# Patient Record
Sex: MALE | Race: WHITE | NOT HISPANIC OR LATINO | Employment: UNEMPLOYED | URBAN - METROPOLITAN AREA
[De-identification: names, ages, dates, MRNs, and addresses within clinical notes are randomized per-mention and may not be internally consistent; named-entity substitution may affect disease eponyms.]

---

## 2017-04-02 ENCOUNTER — HOSPITAL ENCOUNTER (EMERGENCY)
Facility: OTHER | Age: 32
Discharge: HOME | End: 2017-04-02
Attending: INTERNAL MEDICINE
Payer: MEDICAID

## 2017-04-02 VITALS
OXYGEN SATURATION: 97 % | WEIGHT: 250 LBS | RESPIRATION RATE: 16 BRPM | BODY MASS INDEX: 31.08 KG/M2 | HEART RATE: 71 BPM | SYSTOLIC BLOOD PRESSURE: 136 MMHG | TEMPERATURE: 98 F | DIASTOLIC BLOOD PRESSURE: 95 MMHG | HEIGHT: 75 IN

## 2017-04-02 DIAGNOSIS — J00 ACUTE NASOPHARYNGITIS: Primary | ICD-10-CM

## 2017-04-02 PROCEDURE — 99283 EMERGENCY DEPT VISIT LOW MDM: CPT

## 2017-04-02 RX ORDER — FLUOXETINE 10 MG/1
20 TABLET ORAL DAILY
COMMUNITY
End: 2018-07-17

## 2017-04-02 RX ORDER — BENZONATATE 100 MG/1
200 CAPSULE ORAL 3 TIMES DAILY PRN
Qty: 20 CAPSULE | Refills: 0 | Status: SHIPPED | OUTPATIENT
Start: 2017-04-02 | End: 2017-04-12

## 2017-04-02 RX ORDER — LITHIUM CARBONATE 300 MG/1
300 CAPSULE ORAL
COMMUNITY
End: 2018-07-17

## 2017-04-02 RX ORDER — AZELASTINE 1 MG/ML
2 SPRAY, METERED NASAL 2 TIMES DAILY
Qty: 30 ML | Refills: 0 | Status: SHIPPED | OUTPATIENT
Start: 2017-04-02 | End: 2017-12-22

## 2017-04-02 RX ORDER — FLUTICASONE PROPIONATE 50 MCG
2 SPRAY, SUSPENSION (ML) NASAL DAILY
Qty: 15 G | Refills: 0 | Status: SHIPPED | OUTPATIENT
Start: 2017-04-02 | End: 2017-12-22

## 2017-04-02 RX ORDER — IBUPROFEN 800 MG/1
800 TABLET ORAL EVERY 8 HOURS PRN
Qty: 20 TABLET | Refills: 0 | Status: SHIPPED | OUTPATIENT
Start: 2017-04-02 | End: 2017-12-22

## 2017-04-02 NOTE — ED AVS SNAPSHOT
Former[facility] EMERGENCY DEPARTMENT  [address] [place] [address]               Vel Holguinthieux   2017  6:43 AM   ED    Description:  Male : 1985   Department:  [facility] Emergency Department           Your Care was Coordinated By:     Provider Role From To    Bigg Calzada MD Attending Provider 17 0657 --      Reason for Visit     Cough     Headache           Diagnoses this Visit        Comments    Acute nasopharyngitis    -  Primary       ED Disposition     ED Disposition Condition Comment    Discharge             To Do List           Follow-up Information     Follow up with Primary Doctor No In 1 week(s).       These Medications        Disp Refills Start End    ibuprofen (ADVIL,MOTRIN) 800 MG tablet 20 tablet 0 2017     Take 1 tablet (800 mg total) by mouth every 8 (eight) hours as needed for Pain. - Oral    fluticasone (FLONASE) 50 mcg/actuation nasal spray 15 g 0 2017     2 sprays by Each Nare route once daily. - Each Nare    azelastine (ASTELIN) 137 mcg (0.1 %) nasal spray 30 mL 0 2017    2 sprays (274 mcg total) by Nasal route 2 (two) times daily. - Nasal    benzonatate (TESSALON) 100 MG capsule 20 capsule 0 2017    Take 2 capsules (200 mg total) by mouth 3 (three) times daily as needed for Cough. - Oral      [facility]     [facility] On Call Nurse Care Line - 24/7 Assistance  Unless otherwise directed by your provider, please contact [facility], our nurse care line that is available for 24/7 assistance.     Registered nurses in the [facility] On Call Center provide: appointment scheduling, clinical advisement, health education, and other advisory services.  Call: [phone] (toll free)               Medications           Message regarding Medications     Verify the changes and/or additions to your medication regime listed below are the same as discussed with your clinician today.  If any of these changes or  "additions are incorrect, please notify your healthcare provider.        START taking these NEW medications        Refills    ibuprofen (ADVIL,MOTRIN) 800 MG tablet 0    Sig: Take 1 tablet (800 mg total) by mouth every 8 (eight) hours as needed for Pain.    Class: Print    Route: Oral    fluticasone (FLONASE) 50 mcg/actuation nasal spray 0    Si sprays by Each Nare route once daily.    Class: Print    Route: Each Nare    azelastine (ASTELIN) 137 mcg (0.1 %) nasal spray 0    Si sprays (274 mcg total) by Nasal route 2 (two) times daily.    Class: Print    Route: Nasal    benzonatate (TESSALON) 100 MG capsule 0    Sig: Take 2 capsules (200 mg total) by mouth 3 (three) times daily as needed for Cough.    Class: Print    Route: Oral           Verify that the below list of medications is an accurate representation of the medications you are currently taking.  If none reported, the list may be blank. If incorrect, please contact your healthcare provider. Carry this list with you in case of emergency.           Current Medications     fluoxetine 10 MG Tab Take 20 mg by mouth once daily.    lithium (ESKALITH) 300 MG capsule Take 300 mg by mouth 3 (three) times daily with meals.    azelastine (ASTELIN) 137 mcg (0.1 %) nasal spray 2 sprays (274 mcg total) by Nasal route 2 (two) times daily.    benzonatate (TESSALON) 100 MG capsule Take 2 capsules (200 mg total) by mouth 3 (three) times daily as needed for Cough.    fluticasone (FLONASE) 50 mcg/actuation nasal spray 2 sprays by Each Nare route once daily.    ibuprofen (ADVIL,MOTRIN) 800 MG tablet Take 1 tablet (800 mg total) by mouth every 8 (eight) hours as needed for Pain.           Clinical Reference Information           Your Vitals Were     BP Pulse Temp Resp Height Weight    136/95 (BP Location: Right arm, Patient Position: Sitting) 71 98 °F (36.7 °C) (Temporal) 16 6' 3" (1.905 m) 113.4 kg (250 lb)    SpO2 BMI             97% 31.25 kg/m2         Allergies as of " 4/2/2017     No Known Allergies      Immunizations Administered on Date of Encounter - 4/2/2017     None      ED Micro, Lab, POCT     None      ED Imaging Orders     None        Discharge Instructions           Viral Upper Respiratory Illness (Adult)  You have a viral upper respiratory illness (URI), which is another term for the common cold. This illness is contagious during the first few days. It is spread through the air by coughing and sneezing. It may also be spread by direct contact (touching the sick person and then touching your own eyes, nose, or mouth). Frequent handwashing will decrease risk of spread. Most viral illnesses go away within 7 to 10 days with rest and simple home remedies. Sometimes the illness may last for several weeks. Antibiotics will not kill a virus, and they are generally not prescribed for this condition.    Home care  · If symptoms are severe, rest at home for the first 2 to 3 days. When you resume activity, don't let yourself get too tired.  · Avoid being exposed to cigarette smoke (yours or others).  · You may use acetaminophen or ibuprofen to control pain and fever, unless another medicine was prescribed. (Note: If you have chronic liver or kidney disease, have ever had a stomach ulcer or gastrointestinal bleeding, or are taking blood-thinning medicines, talk with your healthcare provider before using these medicines.) Aspirin should never be given to anyone under 18 years of age who is ill with a viral infection or fever. It may cause severe liver or brain damage.  · Your appetite may be poor, so a light diet is fine. Avoid dehydration by drinking 6 to 8 glasses of fluids per day (water, soft drinks, juices, tea, or soup). Extra fluids will help loosen secretions in the nose and lungs.  · Over-the-counter cold medicines will not shorten the length of time youre sick, but they may be helpful for the following symptoms: cough, sore throat, and nasal and sinus congestion. (Note:  Do not use decongestants if you have high blood pressure.)  Follow-up care  Follow up with your healthcare provider, or as advised.  When to seek medical advice  Call your healthcare provider right away if any of these occur:  · Cough with lots of colored sputum (mucus)  · Severe headache; face, neck, or ear pain  · Difficulty swallowing due to throat pain  · Fever of 100.4°F (38°C)  Call 911, or get immediate medical care  Call emergency services right away if any of these occur:  · Chest pain, shortness of breath, wheezing, or difficulty breathing  · Coughing up blood  · Inability to swallow due to throat pain  Date Last Reviewed: 9/13/2015  © 5029-1757 [employer]. [address] [place] [address], [place] [address]. All rights reserved. This information is not intended as a substitute for professional medical care. Always follow your healthcare professional's instructions.          [facility] Sign-Up     Activating your [facility] account is as easy as 1-2-3!     1) Visit [email or web address], select Sign Up Now, enter this activation code and your date of birth, then select Next.  [email or web address]  Expires: 5/17/2017  7:04 AM      2) Create a username and password to use when you visit [facility] in the future and select a security question in case you lose your password and select Next.    3) Enter your e-mail address and click Sign Up!    Additional Information  If you have questions, please e-mail [email or web address] or call [phone] to talk to our [facility] staff. Remember, [facility] is NOT to be used for urgent needs. For medical emergencies, dial 911.          [facility] Emergency Department complies with applicable Federal civil rights laws and does not discriminate on the basis of race, color, national origin, age, disability, or sex.        Language Assistance Services     ATTENTION: Language assistance services are available, free of charge. Please call [phone].      ATENCIÓN: Lala scott  español, tiene a akins disposición servicios gratuitos de asistencia lingüística. Llame al [phone].     FERDINAND Ý: N?u b?n nói Ti?ng Vi?t, có các d?ch v? h? tr? ngôn ng? mi?n phí dành cho b?n. G?i s? [phone].

## 2017-04-02 NOTE — ED PROVIDER NOTES
Encounter Date: 4/2/2017       History     Chief Complaint   Patient presents with    Cough     cough for the last month, states OTC meds are not working    Headache     Review of patient's allergies indicates:  No Known Allergies  Patient is a 31 y.o. male presenting with the following complaint: URI. The history is provided by the patient. No  was used.   URI   The primary symptoms include cough. The current episode started several days ago. This is a new problem. The problem has not changed since onset.  The cough began more than 1 week ago. The sputum is clear.   Symptoms associated with the illness include congestion. The following treatments were addressed: A decongestant was ineffective.     No past medical history on file.  No past surgical history on file.  No family history on file.  Social History   Substance Use Topics    Smoking status: Never Smoker    Smokeless tobacco: Not on file    Alcohol use No     Review of Systems   Constitutional: Negative.    HENT: Positive for congestion.    Eyes: Negative.    Respiratory: Positive for cough.    Cardiovascular: Negative.    Gastrointestinal: Negative.    Endocrine: Negative.    Musculoskeletal: Negative.    Skin: Negative.    Allergic/Immunologic: Negative.    Neurological: Negative.    Hematological: Negative.    Psychiatric/Behavioral: Negative.        Physical Exam   Initial Vitals   BP Pulse Resp Temp SpO2   04/02/17 0638 04/02/17 0638 04/02/17 0638 04/02/17 0638 04/02/17 0638   136/95 71 16 98 °F (36.7 °C) 97 %     Physical Exam    Nursing note and vitals reviewed.  Constitutional: He appears well-developed and well-nourished.   HENT:   Head: Normocephalic.   Nose: Rhinorrhea present.   Mouth/Throat: Posterior oropharyngeal erythema present. No oropharyngeal exudate or posterior oropharyngeal edema.   Eyes: EOM are normal.   Neck: Normal range of motion.   Cardiovascular: Normal rate and regular rhythm.   Pulmonary/Chest: Breath  sounds normal. No respiratory distress. He has no wheezes.   Abdominal: Soft.   Musculoskeletal: Normal range of motion.   Neurological: He is alert.   Skin: Skin is warm.         ED Course   Procedures  Labs Reviewed - No data to display             Additional MDM:   Differential Diagnosis:   Symptom: Cough. <> The follow diagnoses were considered and will be evaluated: ACE Inhibitor Induced Cough, Asthma, Pneumonitis and Viral Bronchitis.                     ED Course     Labs Reviewed  No visits with results within 1 Day(s) from this visit.  Latest known visit with results is:    Historical on 04/04/2008   Component Date Value Ref Range Status    Glucose 04/04/2008 103  70 - 110 mg/dl Final    BUN, Bld 04/04/2008 12  5 - 23 mg/dl Final    Creatinine 04/04/2008 1.0  0.5 - 1.4 mg/dl Final    Calcium 04/04/2008 9.6  8.7 - 10.5 mg/dl Final    Sodium 04/04/2008 138  136 - 145 mMol/l Final    Potassium 04/04/2008 4.0  3.3 - 5.3 mMol/l Final    Chloride 04/04/2008 100  95 - 110 mMol/l Final    Total Protein 04/04/2008 7.2  6.0 - 8.4 gm/dl Final    Albumin 04/04/2008 4.8  3.5 - 5.2 g/dl Final    Total Bilirubin 04/04/2008 0.6  0.1 - 1.0 mg/dl Final    Comment: These are the guidelines recommended by the .  Especially  for infants and newborns, interpretation of results should be based  on gestational age, weight and in agreement with clinical  observations.  .  Premature Infant recommended reference ranges:  Up to 24 hours.............<8.0 mg/dl  Up to 48 hours............<12.0 mg/dl  3-5 days..................<15.0 mg/dl  6-29 days.................<15.0 mg/dl      AST 04/04/2008 19  0 - 37 U/L Final    Alkaline Phosphatase 04/04/2008 104  55 - 135 U/L Final    CO2 04/04/2008 28  23.0 - 29.0 mEq/L Final    ALT 04/04/2008 34  0 - 40 U/L Final    Cholesterol 04/04/2008 148  120 - 199 mg/dL Final    Comment: The National Cholesterol Education Program (NCEP) has set the  following guidelines  (reference ranges) for Cholesterol:  Desirable...................<200 mg/dL  Borderline High.............200-239 mg/dL  High........................> or = 240 mg/dL      Triglycerides 04/04/2008 102  38 - 125 mg/dL Final    Comment: The National Cholesterol Education Program (NCEP) has set the  following guidelines (reference values) for triglycerides:  Normal......................<150 mg/dL  Borderline High.............150-199 mg/dL  High........................200-499 mg/dL  Very High...................> or = 500 mg/dL      HDL 04/04/2008 29* 40 - 63 mg/dL Final    Comment: The National Cholesterol Education Program (NCEP) has set the  following guidelines (reference values) for HDL Cholesterol:  Low...............<40  Optimal...........>60      LDL Cholesterol 04/04/2008 98.6  63 - 129 mg/dl Final    Comment: The National Cholesterol Education Program (NCEP) has set the  following guidelines (reference values) for LDL Cholesterol:  Desirable.....................<130 mg/dL  Borderline High...............130-159 mg/dL  High..........................> or = 160 mg/dL      HDL/Chol Ratio 04/04/2008 19.6* 20 - 50 % Final    Total Cholesterol/HDL Ratio 04/04/2008 5.1* 2.0 - 5.0 Final    WBC 04/04/2008 8.05  4.8 - 10.8 K/uL Final    RBC 04/04/2008 5.14  4.60 - 6.20 M/uL Final    Hemoglobin 04/04/2008 14.9  14.0 - 18.0 gm/dl Final    Hematocrit 04/04/2008 46.4  40.0 - 54.0 % Final    MCV 04/04/2008 90.3  82 - 95 fL Final    MCH 04/04/2008 29.0  27 - 31 pg Final    MCHC 04/04/2008 32.1  32 - 36 % Final    RDW 04/04/2008 13.0  11.5 - 14.5 % Final    Gran% 04/04/2008 53.9  40 - 76 % Final    Lymph% 04/04/2008 28.6  25 - 40 % Final    Mono% 04/04/2008 13.2* 0 - 10 % Final    Eosinophil% 04/04/2008 3.7  0.0 - 8.0 % Final    Basophil% 04/04/2008 0.6  0 - 2 % Final    Gran # 04/04/2008 4.3  1.4 - 8.7 K/uL Final    Lymph # 04/04/2008 2.3  1.2 - 3.5 K/uL Final    Mono # 04/04/2008 1.1* 0.1 - 0.8 K/uL Final     Eos # 04/04/2008 0.3  0.0 - 0.4 K/uL Final    Baso # 04/04/2008 0.1  0.0 - 0.1 K/uL Final    Platelets 04/04/2008 324  150 - 350 K/uL Final    MPV 04/04/2008 10.7  9.2 - 12.9 fL Final    TSH 04/04/2008 2.8  0.4 - 4.0 uIU/ml Final    Test Name 04/04/2008 HEMOGLOBIN A1C, BLOOD   Final    Normal Range 04/04/2008 4.7-5.8%   Final    DRUG TEST RESULT 04/04/2008 5.3%   Final        Imaging Reviewed    Imaging Results     None          Medications given in ED    Medications - No data to display    Discharge Medications     Discharge Medication List as of 4/2/2017  7:04 AM      START taking these medications    Details   azelastine (ASTELIN) 137 mcg (0.1 %) nasal spray 2 sprays (274 mcg total) by Nasal route 2 (two) times daily., Starting 4/2/2017, Until Mon 4/2/18, Print      benzonatate (TESSALON) 100 MG capsule Take 2 capsules (200 mg total) by mouth 3 (three) times daily as needed for Cough., Starting 4/2/2017, Until Wed 4/12/17, Print      fluticasone (FLONASE) 50 mcg/actuation nasal spray 2 sprays by Each Nare route once daily., Starting 4/2/2017, Until Discontinued, Print      ibuprofen (ADVIL,MOTRIN) 800 MG tablet Take 1 tablet (800 mg total) by mouth every 8 (eight) hours as needed for Pain., Starting 4/2/2017, Until Discontinued, Print         CONTINUE these medications which have NOT CHANGED    Details   fluoxetine 10 MG Tab Take 20 mg by mouth once daily., Until Discontinued, Historical Med      lithium (ESKALITH) 300 MG capsule Take 300 mg by mouth 3 (three) times daily with meals., Until Discontinued, Historical Med                   Patient discharged to home in stable condition with instructions to:   1. Please take all meds as prescribed.  2. Follow-up with your primary care doctor   3. Return precautions discussed and patient and/or family/caretaker understands to return to the emergency room for any concerns including worsening of your current symptoms, fever, chills, night sweats, worsening pain,  chest pain, shortness of breath, nausea, vomiting, diarrhea, bleeding, headache, difficulty talking, visual disturbances, weakness, numbness or any other acute concerns    Clinical Impression:   Acute nasopharyngitis  Disposition:   Disposition: Discharged  Condition: Stable       Bigg Calzada MD  04/02/17 0744

## 2017-04-02 NOTE — DISCHARGE INSTRUCTIONS
Viral Upper Respiratory Illness (Adult)  You have a viral upper respiratory illness (URI), which is another term for the common cold. This illness is contagious during the first few days. It is spread through the air by coughing and sneezing. It may also be spread by direct contact (touching the sick person and then touching your own eyes, nose, or mouth). Frequent handwashing will decrease risk of spread. Most viral illnesses go away within 7 to 10 days with rest and simple home remedies. Sometimes the illness may last for several weeks. Antibiotics will not kill a virus, and they are generally not prescribed for this condition.    Home care  · If symptoms are severe, rest at home for the first 2 to 3 days. When you resume activity, don't let yourself get too tired.  · Avoid being exposed to cigarette smoke (yours or others).  · You may use acetaminophen or ibuprofen to control pain and fever, unless another medicine was prescribed. (Note: If you have chronic liver or kidney disease, have ever had a stomach ulcer or gastrointestinal bleeding, or are taking blood-thinning medicines, talk with your healthcare provider before using these medicines.) Aspirin should never be given to anyone under 18 years of age who is ill with a viral infection or fever. It may cause severe liver or brain damage.  · Your appetite may be poor, so a light diet is fine. Avoid dehydration by drinking 6 to 8 glasses of fluids per day (water, soft drinks, juices, tea, or soup). Extra fluids will help loosen secretions in the nose and lungs.  · Over-the-counter cold medicines will not shorten the length of time youre sick, but they may be helpful for the following symptoms: cough, sore throat, and nasal and sinus congestion. (Note: Do not use decongestants if you have high blood pressure.)  Follow-up care  Follow up with your healthcare provider, or as advised.  When to seek medical advice  Call your healthcare provider right away if  any of these occur:  · Cough with lots of colored sputum (mucus)  · Severe headache; face, neck, or ear pain  · Difficulty swallowing due to throat pain  · Fever of 100.4°F (38°C)  Call 911, or get immediate medical care  Call emergency services right away if any of these occur:  · Chest pain, shortness of breath, wheezing, or difficulty breathing  · Coughing up blood  · Inability to swallow due to throat pain  Date Last Reviewed: 9/13/2015 © 2000-2016 [employer]. [address] [place] [address]. All rights reserved. This information is not intended as a substitute for professional medical care. Always follow your healthcare professional's instructions.

## 2017-12-22 ENCOUNTER — HOSPITAL ENCOUNTER (EMERGENCY)
Facility: OTHER | Age: 32
Discharge: HOME | End: 2017-12-22
Attending: INTERNAL MEDICINE
Payer: MEDICAID

## 2017-12-22 VITALS
HEART RATE: 86 BPM | RESPIRATION RATE: 18 BRPM | WEIGHT: 250 LBS | TEMPERATURE: 98 F | HEIGHT: 75 IN | SYSTOLIC BLOOD PRESSURE: 139 MMHG | BODY MASS INDEX: 31.08 KG/M2 | DIASTOLIC BLOOD PRESSURE: 86 MMHG | OXYGEN SATURATION: 96 %

## 2017-12-22 DIAGNOSIS — J00 ACUTE NASOPHARYNGITIS: Primary | ICD-10-CM

## 2017-12-22 DIAGNOSIS — B34.9 VIRAL SYNDROME: ICD-10-CM

## 2017-12-22 PROCEDURE — 99283 EMERGENCY DEPT VISIT LOW MDM: CPT

## 2017-12-22 RX ORDER — FLUTICASONE PROPIONATE 50 MCG
2 SPRAY, SUSPENSION (ML) NASAL DAILY
Qty: 15 G | Refills: 0 | Status: SHIPPED | OUTPATIENT
Start: 2017-12-22 | End: 2018-07-17

## 2017-12-22 RX ORDER — AZELASTINE 1 MG/ML
2 SPRAY, METERED NASAL 2 TIMES DAILY
Qty: 30 ML | Refills: 0 | Status: SHIPPED | OUTPATIENT
Start: 2017-12-22 | End: 2018-07-17

## 2017-12-22 RX ORDER — IBUPROFEN 800 MG/1
800 TABLET ORAL EVERY 8 HOURS PRN
Qty: 20 TABLET | Refills: 0 | Status: SHIPPED | OUTPATIENT
Start: 2017-12-22 | End: 2018-07-17

## 2017-12-23 NOTE — ED PROVIDER NOTES
Encounter Date: 12/22/2017       History     Chief Complaint   Patient presents with    Cough     pt c/o cough and HA x 12 hours     32-year-old male presents to the emergency department complaining of cough and congestion times one day.  Also states he has a mild headache.  Denies fevers/chills, but states he had an episode of vomiting after a coughing spell      The history is provided by the patient. No  was used.   URI   The primary symptoms include headaches and cough. The current episode started today. This is a new problem. The problem has not changed since onset.  The headache began today. Headache is a new problem. The pain from the headache is at a severity of 2/10. Location/region(s) of the headache: bilateral.   The cough began today. The cough is productive. The sputum is clear.   The onset of the illness is associated with exposure to sick contacts. Symptoms associated with the illness include congestion and rhinorrhea.     Review of patient's allergies indicates:  No Known Allergies  History reviewed. No pertinent past medical history.  History reviewed. No pertinent surgical history.  No family history on file.  Social History   Substance Use Topics    Smoking status: Never Smoker    Smokeless tobacco: Never Used    Alcohol use No     Review of Systems   HENT: Positive for congestion, postnasal drip and rhinorrhea.    Respiratory: Positive for cough.    Neurological: Positive for headaches.   All other systems reviewed and are negative.      Physical Exam     Initial Vitals [12/22/17 2135]   BP Pulse Resp Temp SpO2   139/86 86 18 98.2 °F (36.8 °C) 96 %      MAP       103.67         Physical Exam    Nursing note and vitals reviewed.  Constitutional: He appears well-developed and well-nourished. No distress.   HENT:   Head: Normocephalic and atraumatic.   Right Ear: External ear normal.   Left Ear: External ear normal.   Clear post nasal drip, clear nasal discharge, posterior  oropharyngeal erythema without edema   Eyes: EOM are normal.   Neck: Normal range of motion.   Cardiovascular: Normal rate and regular rhythm.   Pulmonary/Chest: Breath sounds normal. No respiratory distress. He has no wheezes. He has no rales.   Abdominal: Soft.   Musculoskeletal: Normal range of motion. He exhibits no tenderness.   Neurological: He is alert. He has normal strength.   Skin: Skin is warm and dry.   Psychiatric: He has a normal mood and affect.         ED Course   Procedures  Labs Reviewed - No data to display          Medical Decision Making:   Initial Assessment:   32-year-old male presents to the emergency department complaining of cough and congestion times one day.  Also states he has a mild headache.  Denies fevers/chills, but states he had an episode of vomiting after a coughing spell  Differential Diagnosis:   Influenza  Acute nasopharyngitis  Strep throat  Headache  ED Management:  Patient was given instructions for acute nasopharyngitis and prescriptions for Astelin, fluticasone and ibuprofen.  He is advised to follow-up with his primary care physician within the next week for reevaluation.                   ED Course      Clinical Impression:   The primary encounter diagnosis was Acute nasopharyngitis. A diagnosis of Viral syndrome was also pertinent to this visit.    Disposition:   Disposition: Discharged  Condition: Stable                        Bigg Calzada MD  12/22/17 7003

## 2017-12-23 NOTE — ED TRIAGE NOTES
Pt presents to ER with c/o cough and headache all day, has been exposed to family members with flu.

## 2018-03-21 ENCOUNTER — CLINICAL SUPPORT (OUTPATIENT)
Dept: OCCUPATIONAL MEDICINE | Facility: CLINIC | Age: 33
End: 2018-03-21

## 2018-03-21 DIAGNOSIS — Z02.1 PRE-EMPLOYMENT DRUG SCREENING: ICD-10-CM

## 2018-03-21 PROCEDURE — 80305 DRUG TEST PRSMV DIR OPT OBS: CPT | Mod: S$GLB,,, | Performed by: EMERGENCY MEDICINE

## 2018-03-21 NOTE — PROGRESS NOTES
Subjective:       Patient ID: Vel Ordonez is a 32 y.o. male.    Chief Complaint: Drug / Alcohol Assessment    HPI  ROS    Objective:      Physical Exam    Assessment:       No diagnosis found.    Plan:       Patient here for pre-employment drug screen.            No Follow-up on file.

## 2018-07-08 ENCOUNTER — HOSPITAL ENCOUNTER (EMERGENCY)
Facility: HOSPITAL | Age: 33
Discharge: PSYCHIATRIC OR SUBSTANCE USE FACILITY | End: 2018-07-08
Attending: EMERGENCY MEDICINE
Payer: MEDICAID

## 2018-07-08 VITALS
BODY MASS INDEX: 30.84 KG/M2 | HEART RATE: 83 BPM | HEIGHT: 75 IN | RESPIRATION RATE: 20 BRPM | DIASTOLIC BLOOD PRESSURE: 83 MMHG | OXYGEN SATURATION: 97 % | TEMPERATURE: 98 F | WEIGHT: 248 LBS | SYSTOLIC BLOOD PRESSURE: 132 MMHG

## 2018-07-08 DIAGNOSIS — R45.851 SUICIDAL IDEATION: ICD-10-CM

## 2018-07-08 DIAGNOSIS — F30.9 MANIC EPISODE: Primary | ICD-10-CM

## 2018-07-08 LAB
ALBUMIN SERPL BCP-MCNC: 4.4 G/DL
ALP SERPL-CCNC: 84 U/L
ALT SERPL W/O P-5'-P-CCNC: 26 U/L
AMPHET+METHAMPHET UR QL: NEGATIVE
ANION GAP SERPL CALC-SCNC: 13 MMOL/L
APAP SERPL-MCNC: <3 UG/ML
AST SERPL-CCNC: 18 U/L
BACTERIA #/AREA URNS AUTO: NORMAL /HPF
BARBITURATES UR QL SCN>200 NG/ML: NEGATIVE
BASOPHILS # BLD AUTO: 0.05 K/UL
BASOPHILS NFR BLD: 0.3 %
BENZODIAZ UR QL SCN>200 NG/ML: NEGATIVE
BILIRUB SERPL-MCNC: 1 MG/DL
BILIRUB UR QL STRIP: NEGATIVE
BUN SERPL-MCNC: 13 MG/DL
BZE UR QL SCN: NEGATIVE
CALCIUM SERPL-MCNC: 10 MG/DL
CANNABINOIDS UR QL SCN: NEGATIVE
CHLORIDE SERPL-SCNC: 106 MMOL/L
CLARITY UR REFRACT.AUTO: CLEAR
CO2 SERPL-SCNC: 22 MMOL/L
COLOR UR AUTO: ABNORMAL
CREAT SERPL-MCNC: 1 MG/DL
CREAT UR-MCNC: 357 MG/DL
DIFFERENTIAL METHOD: ABNORMAL
EOSINOPHIL # BLD AUTO: 0 K/UL
EOSINOPHIL NFR BLD: 0.1 %
ERYTHROCYTE [DISTWIDTH] IN BLOOD BY AUTOMATED COUNT: 13.5 %
EST. GFR  (AFRICAN AMERICAN): >60 ML/MIN/1.73 M^2
EST. GFR  (NON AFRICAN AMERICAN): >60 ML/MIN/1.73 M^2
ETHANOL SERPL-MCNC: <10 MG/DL
GLUCOSE SERPL-MCNC: 167 MG/DL
GLUCOSE UR QL STRIP: ABNORMAL
HCT VFR BLD AUTO: 45.5 %
HGB BLD-MCNC: 15.1 G/DL
HGB UR QL STRIP: NEGATIVE
HYALINE CASTS UR QL AUTO: 0 /LPF
IMM GRANULOCYTES # BLD AUTO: 0.05 K/UL
IMM GRANULOCYTES NFR BLD AUTO: 0.3 %
KETONES UR QL STRIP: NEGATIVE
LEUKOCYTE ESTERASE UR QL STRIP: NEGATIVE
LITHIUM SERPL-SCNC: <0.1 MMOL/L
LYMPHOCYTES # BLD AUTO: 1.8 K/UL
LYMPHOCYTES NFR BLD: 11.6 %
MCH RBC QN AUTO: 30.4 PG
MCHC RBC AUTO-ENTMCNC: 33.2 G/DL
MCV RBC AUTO: 92 FL
METHADONE UR QL SCN>300 NG/ML: NEGATIVE
MICROSCOPIC COMMENT: NORMAL
MONOCYTES # BLD AUTO: 1.1 K/UL
MONOCYTES NFR BLD: 6.9 %
NEUTROPHILS # BLD AUTO: 12.3 K/UL
NEUTROPHILS NFR BLD: 80.8 %
NITRITE UR QL STRIP: NEGATIVE
NRBC BLD-RTO: 0 /100 WBC
OPIATES UR QL SCN: NEGATIVE
PCP UR QL SCN>25 NG/ML: NEGATIVE
PH UR STRIP: 5 [PH] (ref 5–8)
PLATELET # BLD AUTO: 375 K/UL
PMV BLD AUTO: 10 FL
POTASSIUM SERPL-SCNC: 3.8 MMOL/L
PROT SERPL-MCNC: 8.1 G/DL
PROT UR QL STRIP: ABNORMAL
RBC # BLD AUTO: 4.97 M/UL
RBC #/AREA URNS AUTO: 1 /HPF (ref 0–4)
SODIUM SERPL-SCNC: 141 MMOL/L
SP GR UR STRIP: >=1.03 (ref 1–1.03)
SQUAMOUS #/AREA URNS AUTO: 0 /HPF
T4 FREE SERPL-MCNC: 1.08 NG/DL
TOXICOLOGY INFORMATION: NORMAL
TSH SERPL DL<=0.005 MIU/L-ACNC: 0.04 UIU/ML
URN SPEC COLLECT METH UR: ABNORMAL
UROBILINOGEN UR STRIP-ACNC: 4 EU/DL
WBC # BLD AUTO: 15.22 K/UL
WBC #/AREA URNS AUTO: 0 /HPF (ref 0–5)

## 2018-07-08 PROCEDURE — 84443 ASSAY THYROID STIM HORMONE: CPT

## 2018-07-08 PROCEDURE — 93010 ELECTROCARDIOGRAM REPORT: CPT | Mod: ,,, | Performed by: INTERNAL MEDICINE

## 2018-07-08 PROCEDURE — 80320 DRUG SCREEN QUANTALCOHOLS: CPT

## 2018-07-08 PROCEDURE — 80178 ASSAY OF LITHIUM: CPT

## 2018-07-08 PROCEDURE — 25000003 PHARM REV CODE 250

## 2018-07-08 PROCEDURE — 99285 EMERGENCY DEPT VISIT HI MDM: CPT | Mod: ,,, | Performed by: EMERGENCY MEDICINE

## 2018-07-08 PROCEDURE — 93005 ELECTROCARDIOGRAM TRACING: CPT

## 2018-07-08 PROCEDURE — 80329 ANALGESICS NON-OPIOID 1 OR 2: CPT

## 2018-07-08 PROCEDURE — 81001 URINALYSIS AUTO W/SCOPE: CPT

## 2018-07-08 PROCEDURE — 80307 DRUG TEST PRSMV CHEM ANLYZR: CPT

## 2018-07-08 PROCEDURE — 84439 ASSAY OF FREE THYROXINE: CPT

## 2018-07-08 PROCEDURE — 85025 COMPLETE CBC W/AUTO DIFF WBC: CPT

## 2018-07-08 PROCEDURE — 99285 EMERGENCY DEPT VISIT HI MDM: CPT | Mod: 25

## 2018-07-08 PROCEDURE — 80053 COMPREHEN METABOLIC PANEL: CPT

## 2018-07-08 RX ORDER — ACETAMINOPHEN 325 MG/1
650 TABLET ORAL
Status: COMPLETED | OUTPATIENT
Start: 2018-07-08 | End: 2018-07-08

## 2018-07-08 RX ORDER — HYDROXYZINE PAMOATE 50 MG/1
50 CAPSULE ORAL DAILY PRN
COMMUNITY
End: 2018-07-17

## 2018-07-08 RX ADMIN — ACETAMINOPHEN 650 MG: 325 TABLET, FILM COATED ORAL at 08:07

## 2018-07-08 NOTE — ED NOTES
Pt identifiers Vel Ordonez were checked and are correct  LOC: The patient is awake, alert, aware of environment with an appropriate affect. Oriented x3, speaking appropriately  APPEARANCE: Pt resting comfortably, in no acute distress, pt is clean and well groomed, clothing properly fastened  SKIN: Skin warm, dry and intact, normal skin turgor, moist mucus membranes  RESPIRATORY: Airway is open and patent, respirations are spontaneous, even and unlabored, normal effort and rate  Breath sounds clear roberto to all lung fields on auscultation  CARDIAC: Normal rate and rhythm, no peripheral edema noted, capillary refill < 3 seconds, bilateral radial pulses 2+  ABDOMEN: Soft, nontender, nondistended. Bowel sounds present to all four quad of abd on auscultation  NEUROLOGIC: PERRL, facial expression is symmetrical, patient moving all extremities spontaneously, normal sensation in all extremities when touched with a finger.  Follows all commands appropriately  MUSCULOSKELETAL: No obvious deformities.

## 2018-07-08 NOTE — ED NOTES
PEC packet faxed to [facility], [facility], Pending sale to [facility].

## 2018-07-08 NOTE — ED PROVIDER NOTES
Encounter Date: 2018    SCRIBE #1 NOTE: I, Estefania Smith, am scribing for, and in the presence of,  Dr. Mares. I have scribed the following portions of the note - the APC attestation and the EKG reading.       History     Chief Complaint   Patient presents with    Suicidal     Feeling suicidal intermittent x 2 weeks.  Pt here voluntarily.     32-year-old male with medical history of bipolar 1 disorder presents the violent behavior suicidal ideation.  Significant other is in the room patient reports that his symptoms have worsened past 2 weeks.  He had a similar episode 8 months ago when he found out his ex-girlfriend was having a baby.  He also endorses his mother dying of cancer in May and  limited ability to see his .  Patient denies violence towards others but he reports hitting objects such as cars and walls.  His father and significant other concerned that he will 1 day/out on them or 1 of his children.  Her significant other his father contact the 's office was planning to OPC but patient voluntarily brought self the ER today. Patient denies SI but SO reports that he has threatened to cut himself with a  on multiple occasions. Patient followed by Dr. Uriarte at [facility]. currenlty on prozac 10mg daily and vistaril 20mg prn, was taken off of lithium.           Review of patient's allergies indicates:  No Known Allergies  Past Medical History:   Diagnosis Date    Bipolar 1 disorder      History reviewed. No pertinent surgical history.  Family History   Problem Relation Age of Onset    Cancer Mother     Heart disease Father     Diabetes Father      Social History   Substance Use Topics    Smoking status: Never Smoker    Smokeless tobacco: Never Used    Alcohol use No     Review of Systems   Constitutional: Positive for appetite change. Negative for chills, diaphoresis, fatigue and fever.   HENT: Negative for congestion.    Eyes: Negative for visual disturbance.    Respiratory: Negative for cough and shortness of breath.    Cardiovascular: Negative for chest pain and palpitations.   Gastrointestinal: Negative for abdominal pain, nausea and vomiting.   Genitourinary: Negative for dysuria, flank pain and hematuria.   Musculoskeletal: Negative for myalgias.   Skin: Negative for rash.   Neurological: Negative for tremors, syncope, weakness, light-headedness and headaches.   Psychiatric/Behavioral: Positive for behavioral problems and suicidal ideas. The patient is not nervous/anxious.         Patient denies SI but SO reports he has threatened to cut his wrist with a          Physical Exam     Initial Vitals [07/08/18 1615]   BP Pulse Resp Temp SpO2   (!) 162/86 92 20 98.2 °F (36.8 °C) 100 %      MAP       --         Physical Exam    Vitals reviewed.  Constitutional: He appears well-developed and well-nourished. He is not diaphoretic. No distress.   HENT:   Head: Normocephalic and atraumatic.   Nose: Nose normal.   Mouth/Throat: Oropharynx is clear and moist. No oropharyngeal exudate.   Eyes: Conjunctivae and EOM are normal. Pupils are equal, round, and reactive to light.   Neck: Normal range of motion. Neck supple. No thyromegaly present.   Cardiovascular: Normal rate, regular rhythm and intact distal pulses.   Pulmonary/Chest: Breath sounds normal. No respiratory distress. He has no wheezes. He exhibits no tenderness.   Abdominal: Soft. Bowel sounds are normal. He exhibits no distension. There is no tenderness. There is no rebound.   Musculoskeletal: Normal range of motion.   Lymphadenopathy:     He has no cervical adenopathy.   Neurological: He is alert and oriented to person, place, and time. He has normal strength. No sensory deficit.   Skin: Skin is warm and dry. Capillary refill takes less than 2 seconds. No rash noted.   Psychiatric: His mood appears anxious. His speech is not rapid and/or pressured and not slurred. He is not actively hallucinating. Thought  content is not paranoid and not delusional. He exhibits a depressed mood. He expresses suicidal ideation. He expresses no homicidal ideation. He expresses suicidal plans. He expresses no homicidal plans.         ED Course   Procedures  Labs Reviewed   CBC W/ AUTO DIFFERENTIAL - Abnormal; Notable for the following:        Result Value    WBC 15.22 (*)     Platelets 375 (*)     Gran # (ANC) 12.3 (*)     Immature Grans (Abs) 0.05 (*)     Mono # 1.1 (*)     Gran% 80.8 (*)     Lymph% 11.6 (*)     All other components within normal limits   COMPREHENSIVE METABOLIC PANEL - Abnormal; Notable for the following:     CO2 22 (*)     Glucose 167 (*)     All other components within normal limits   TSH - Abnormal; Notable for the following:     TSH 0.042 (*)     All other components within normal limits    Narrative:     ADD ON LITHIUM PER DR BALTAZAR CORONEL/ORDER# [id number] @ 17:39   7/8/18   ACETAMINOPHEN LEVEL - Abnormal; Notable for the following:     Acetaminophen (Tylenol), Serum <3.0 (*)     All other components within normal limits   URINALYSIS, REFLEX TO URINE CULTURE - Abnormal; Notable for the following:     Specific Gravity, UA >=1.030 (*)     Protein, UA 1+ (*)     Glucose, UA 1+ (*)     All other components within normal limits    Narrative:     Preferred Collection Type->Urine, Clean Catch  cup received  07/08/2018  17:36    LITHIUM LEVEL - Abnormal; Notable for the following:     Lithium Lvl <0.1 (*)     All other components within normal limits    Narrative:     ADD ON LITHIUM PER DR BALTAZAR CORONEL/ORDER# [id number] @ 17:39   7/8/18   DRUG SCREEN PANEL, URINE EMERGENCY    Narrative:     Preferred Collection Type->Urine, Clean Catch  cup received  07/08/2018  17:36    ALCOHOL,MEDICAL (ETHANOL)   LITHIUM LEVEL   T4, FREE    Narrative:     ADD ON LITHIUM PER DR BALTAZAR CORONEL/ORDER# [id number] @ 17:39   7/8/18   URINALYSIS MICROSCOPIC    Narrative:     Preferred Collection Type->Urine, Clean Catch  cup  received  2018  17:36      EKG Readings: (Independently Interpreted)   Sinus rhythm with left axis deviation and normal ST segments at 89 bpm       Imaging Results    None          Medical Decision Making:   History:   Old Medical Records: I decided to obtain old medical records.  Old Records Summarized: records from clinic visits.  Initial Assessment:   32-year-old male with medical history bipolar 1 disorder presents to the ED with violent behavior and suicidal ideation x 2 weeks. SO states situation began to decline with death of patient's mother in May and unable to be apart of his 's life. Reports hitting cars and walls, has not hit anyone but concerns that he will hit someone. Currently on prozac and vistaril. Reports being taken off of lithium.  Differential Diagnosis:   DDX includes but is not limited to SI, manic episode, electrolyte abnormality, anemia, medication non compliance.   Independently Interpreted Test(s):   I have ordered and independently interpreted EKG Reading(s) - see prior notes  Clinical Tests:   Lab Tests: Ordered and Reviewed  Medical Tests: Ordered and Reviewed  ED Management:  Will get labs, UA, EKG and PEC patient.     Significant labs include TSH .042. Awaiting T4. All other labs are benign. EKG shows sinus rhythm at 89bpm. T4 .042. Patient is medically cleared for inpatient psychiatric admission.    I have discussed the treatment and management of this patient with my supervisory physician, and we agree on the plan of care.               Scribe Attestation:   Scribe #1: I performed the above scribed service and the documentation accurately describes the services I performed. I attest to the accuracy of the note.    Attending Attestation:     Physician Attestation Statement for NP/PA:   I have conducted a face to face encounter with this patient in addition to the NP/PA, due to Medical Complexity    Other NP/PA Attestation Additions:    History of Present Illness: 32  year old man with co-morbidities of bipolar 1 disorder presents for evaluation of worsening agitation with suicidal ideations and violent outbursts.                       Clinical Impression:   The primary encounter diagnosis was Manic episode. A diagnosis of Suicidal ideation was also pertinent to this visit.      Disposition:   Disposition: Transferred  Condition: Stable                        Genevieve Murillo PA-C  07/08/18 6182

## 2018-07-08 NOTE — ED TRIAGE NOTES
" P t state for past two weeks pt has been upset and having episode of "going off on people"   Pt has been blacking out and has been very anxious   "

## 2018-07-09 NOTE — ED NOTES
Called [facility]. JANINE Turcios. Unable to take another pt due to pt acting up and being aggressive in [facility]. Will let charge nurse know.

## 2018-07-09 NOTE — ED NOTES
Pt accepted to [facility]. Accepting MD Lanny Call report [phone] option#2.  JANINE Sanchez was informed.

## 2018-07-17 ENCOUNTER — HOSPITAL ENCOUNTER (OUTPATIENT)
Facility: HOSPITAL | Age: 33
Discharge: HOME | End: 2018-07-18
Attending: EMERGENCY MEDICINE | Admitting: INTERNAL MEDICINE
Payer: MEDICAID

## 2018-07-17 DIAGNOSIS — I21.4 NSTEMI (NON-ST ELEVATED MYOCARDIAL INFARCTION): ICD-10-CM

## 2018-07-17 DIAGNOSIS — J00 ACUTE NASOPHARYNGITIS (COMMON COLD): ICD-10-CM

## 2018-07-17 DIAGNOSIS — N17.9 AKI (ACUTE KIDNEY INJURY): ICD-10-CM

## 2018-07-17 DIAGNOSIS — R55 SYNCOPE, UNSPECIFIED SYNCOPE TYPE: ICD-10-CM

## 2018-07-17 DIAGNOSIS — F31.9 BIPOLAR 1 DISORDER: Chronic | ICD-10-CM

## 2018-07-17 DIAGNOSIS — R79.89 ELEVATED TROPONIN: Primary | ICD-10-CM

## 2018-07-17 LAB
ALBUMIN SERPL-MCNC: 3.9 G/DL (ref 3.3–5.5)
ALP SERPL-CCNC: 64 U/L (ref 42–141)
AMPHET+METHAMPHET UR QL: NEGATIVE
BARBITURATES UR QL SCN>200 NG/ML: NEGATIVE
BASOPHILS # BLD AUTO: 0.05 K/UL
BASOPHILS NFR BLD: 0.4 %
BENZODIAZ UR QL SCN>200 NG/ML: NEGATIVE
BILIRUB SERPL-MCNC: 0.5 MG/DL (ref 0.2–1.6)
BILIRUBIN, POC UA: NEGATIVE
BLOOD, POC UA: NEGATIVE
BUN SERPL-MCNC: 14 MG/DL (ref 7–22)
BZE UR QL SCN: NEGATIVE
CALCIUM SERPL-MCNC: 8.7 MG/DL (ref 8–10.3)
CANNABINOIDS UR QL SCN: NEGATIVE
CHLORIDE SERPL-SCNC: 104 MMOL/L (ref 98–108)
CK SERPL-CCNC: 112 U/L
CLARITY, POC UA: CLEAR
COLOR, POC UA: YELLOW
CREAT SERPL-MCNC: 1.3 MG/DL (ref 0.6–1.2)
CREAT UR-MCNC: 119 MG/DL
DIFFERENTIAL METHOD: ABNORMAL
EOSINOPHIL # BLD AUTO: 0.1 K/UL
EOSINOPHIL NFR BLD: 1 %
ERYTHROCYTE [DISTWIDTH] IN BLOOD BY AUTOMATED COUNT: 14.1 %
GLUCOSE SERPL-MCNC: 80 MG/DL (ref 73–118)
GLUCOSE, POC UA: NEGATIVE
HCT VFR BLD AUTO: 45.9 %
HGB BLD-MCNC: 15.3 G/DL
KETONES, POC UA: NEGATIVE
LEUKOCYTE EST, POC UA: NEGATIVE
LYMPHOCYTES # BLD AUTO: 2.2 K/UL
LYMPHOCYTES NFR BLD: 15.5 %
MCH RBC QN AUTO: 30.4 PG
MCHC RBC AUTO-ENTMCNC: 33.3 G/DL
MCV RBC AUTO: 91 FL
METHADONE UR QL SCN>300 NG/ML: NEGATIVE
MONOCYTES # BLD AUTO: 1.9 K/UL
MONOCYTES NFR BLD: 13.5 %
NEUTROPHILS # BLD AUTO: 9.9 K/UL
NEUTROPHILS NFR BLD: 69.6 %
NITRITE, POC UA: NEGATIVE
OPIATES UR QL SCN: NEGATIVE
PCP UR QL SCN>25 NG/ML: NEGATIVE
PH UR STRIP: 6.5 [PH]
PLATELET # BLD AUTO: 350 K/UL
PMV BLD AUTO: 10.3 FL
POC ALT (SGPT): 25 U/L (ref 10–47)
POC AST (SGOT): 22 U/L (ref 11–38)
POC B-TYPE NATRIURETIC PEPTIDE: 28.7 PG/ML (ref 0–100)
POC CARDIAC TROPONIN I: 0.22 NG/ML
POC D-DI: <100 NG/ML (ref 0–450)
POC TCO2: 26 MMOL/L (ref 18–33)
POTASSIUM BLD-SCNC: 4.2 MMOL/L (ref 3.6–5.1)
PROTEIN, POC UA: NEGATIVE
PROTEIN, POC: 7 G/DL (ref 6.4–8.1)
RBC # BLD AUTO: 5.03 M/UL
SAMPLE: ABNORMAL
SODIUM BLD-SCNC: 145 MMOL/L (ref 128–145)
SPECIFIC GRAVITY, POC UA: 1.01
TOXICOLOGY INFORMATION: NORMAL
UROBILINOGEN, POC UA: 1 E.U./DL
VALPROATE SERPL-MCNC: 61.6 UG/ML
WBC # BLD AUTO: 14.22 K/UL

## 2018-07-17 PROCEDURE — 80164 ASSAY DIPROPYLACETIC ACD TOT: CPT

## 2018-07-17 PROCEDURE — 63600175 PHARM REV CODE 636 W HCPCS: Performed by: EMERGENCY MEDICINE

## 2018-07-17 PROCEDURE — 25000003 PHARM REV CODE 250: Performed by: EMERGENCY MEDICINE

## 2018-07-17 PROCEDURE — 85025 COMPLETE CBC W/AUTO DIFF WBC: CPT

## 2018-07-17 PROCEDURE — 96361 HYDRATE IV INFUSION ADD-ON: CPT

## 2018-07-17 PROCEDURE — 93010 ELECTROCARDIOGRAM REPORT: CPT | Mod: ,,, | Performed by: INTERNAL MEDICINE

## 2018-07-17 PROCEDURE — 81003 URINALYSIS AUTO W/O SCOPE: CPT

## 2018-07-17 PROCEDURE — 96372 THER/PROPH/DIAG INJ SC/IM: CPT | Mod: 59

## 2018-07-17 PROCEDURE — 85379 FIBRIN DEGRADATION QUANT: CPT

## 2018-07-17 PROCEDURE — 99285 EMERGENCY DEPT VISIT HI MDM: CPT | Mod: 25

## 2018-07-17 PROCEDURE — 82550 ASSAY OF CK (CPK): CPT

## 2018-07-17 PROCEDURE — 80307 DRUG TEST PRSMV CHEM ANLYZR: CPT

## 2018-07-17 PROCEDURE — 84484 ASSAY OF TROPONIN QUANT: CPT

## 2018-07-17 PROCEDURE — 83880 ASSAY OF NATRIURETIC PEPTIDE: CPT

## 2018-07-17 PROCEDURE — 93005 ELECTROCARDIOGRAM TRACING: CPT

## 2018-07-17 PROCEDURE — 96360 HYDRATION IV INFUSION INIT: CPT

## 2018-07-17 PROCEDURE — 80053 COMPREHEN METABOLIC PANEL: CPT

## 2018-07-17 RX ORDER — ASPIRIN 325 MG
325 TABLET ORAL
Status: COMPLETED | OUTPATIENT
Start: 2018-07-17 | End: 2018-07-17

## 2018-07-17 RX ORDER — QUETIAPINE FUMARATE 100 MG/1
TABLET, FILM COATED ORAL NIGHTLY
COMMUNITY
End: 2018-12-01

## 2018-07-17 RX ORDER — ENOXAPARIN SODIUM 100 MG/ML
100 INJECTION SUBCUTANEOUS
Status: COMPLETED | OUTPATIENT
Start: 2018-07-17 | End: 2018-07-17

## 2018-07-17 RX ORDER — DIVALPROEX SODIUM 250 MG/1
500 TABLET, DELAYED RELEASE ORAL 3 TIMES DAILY
COMMUNITY
End: 2018-12-01

## 2018-07-17 RX ORDER — SODIUM CHLORIDE 9 MG/ML
1000 INJECTION, SOLUTION INTRAVENOUS
Status: COMPLETED | OUTPATIENT
Start: 2018-07-17 | End: 2018-07-18

## 2018-07-17 RX ADMIN — ASPIRIN 325 MG ORAL TABLET 325 MG: 325 PILL ORAL at 10:07

## 2018-07-17 RX ADMIN — SODIUM CHLORIDE 1000 ML: 0.9 INJECTION, SOLUTION INTRAVENOUS at 10:07

## 2018-07-17 RX ADMIN — ENOXAPARIN SODIUM 100 MG: 100 INJECTION SUBCUTANEOUS at 10:07

## 2018-07-17 NOTE — LETTER
July 18, 2018         [place] [address]  Phone: [phone]       Patient: Vel Ordonez   YOB: 1985  Date of Visit: 07/18/2018    To Whom It May Concern:    Phoenix Ordonez  was at [facility] on 07/18/2018. He may return to work/school on 7/23/18 with restrictions, no heavy lifting or strenuous activities with right arm for 2 full weeks. If you have any questions or concerns, or if I can be of further assistance, please do not hesitate to contact me.    Sincerely,          Buck Kline Jr., NP

## 2018-07-18 VITALS
HEIGHT: 75 IN | BODY MASS INDEX: 31.36 KG/M2 | SYSTOLIC BLOOD PRESSURE: 121 MMHG | WEIGHT: 252.19 LBS | TEMPERATURE: 99 F | HEART RATE: 82 BPM | OXYGEN SATURATION: 100 % | RESPIRATION RATE: 18 BRPM | DIASTOLIC BLOOD PRESSURE: 73 MMHG

## 2018-07-18 PROBLEM — F31.9 BIPOLAR 1 DISORDER: Chronic | Status: ACTIVE | Noted: 2018-07-18

## 2018-07-18 PROBLEM — I21.4 NSTEMI (NON-ST ELEVATED MYOCARDIAL INFARCTION): Status: ACTIVE | Noted: 2018-07-17

## 2018-07-18 LAB
ALBUMIN SERPL BCP-MCNC: 3.8 G/DL
ALP SERPL-CCNC: 59 U/L
ALT SERPL W/O P-5'-P-CCNC: 22 U/L
ANION GAP SERPL CALC-SCNC: 9 MMOL/L
AST SERPL-CCNC: 15 U/L
BASOPHILS # BLD AUTO: 0.04 K/UL
BASOPHILS NFR BLD: 0.4 %
BILIRUB SERPL-MCNC: 0.4 MG/DL
BUN SERPL-MCNC: 14 MG/DL
CALCIUM SERPL-MCNC: 9.2 MG/DL
CHLORIDE SERPL-SCNC: 109 MMOL/L
CHOLEST SERPL-MCNC: 129 MG/DL
CHOLEST/HDLC SERPL: 6.5 {RATIO}
CO2 SERPL-SCNC: 26 MMOL/L
CREAT SERPL-MCNC: 1 MG/DL
DIFFERENTIAL METHOD: ABNORMAL
EOSINOPHIL # BLD AUTO: 0.2 K/UL
EOSINOPHIL NFR BLD: 1.6 %
ERYTHROCYTE [DISTWIDTH] IN BLOOD BY AUTOMATED COUNT: 14.3 %
EST. GFR  (AFRICAN AMERICAN): >60 ML/MIN/1.73 M^2
EST. GFR  (NON AFRICAN AMERICAN): >60 ML/MIN/1.73 M^2
ESTIMATED AVG GLUCOSE: 100 MG/DL
ESTIMATED PA SYSTOLIC PRESSURE: 28.79
GLOBAL PERICARDIAL EFFUSION: NORMAL
GLUCOSE SERPL-MCNC: 89 MG/DL
HBA1C MFR BLD HPLC: 5.1 %
HCT VFR BLD AUTO: 42.4 %
HDLC SERPL-MCNC: 20 MG/DL
HDLC SERPL: 15.5 %
HGB BLD-MCNC: 13.8 G/DL
INR PPP: 1
LDLC SERPL CALC-MCNC: 77.8 MG/DL
LYMPHOCYTES # BLD AUTO: 3 K/UL
LYMPHOCYTES NFR BLD: 28.3 %
MAGNESIUM SERPL-MCNC: 2.3 MG/DL
MCH RBC QN AUTO: 30.1 PG
MCHC RBC AUTO-ENTMCNC: 32.5 G/DL
MCV RBC AUTO: 93 FL
MITRAL VALVE MOBILITY: NORMAL
MITRAL VALVE REGURGITATION: NORMAL
MONOCYTES # BLD AUTO: 1.3 K/UL
MONOCYTES NFR BLD: 11.8 %
NEUTROPHILS # BLD AUTO: 6.2 K/UL
NEUTROPHILS NFR BLD: 57.5 %
NONHDLC SERPL-MCNC: 109 MG/DL
PHOSPHATE SERPL-MCNC: 3.6 MG/DL
PLATELET # BLD AUTO: 324 K/UL
PMV BLD AUTO: 9.9 FL
POTASSIUM SERPL-SCNC: 4 MMOL/L
PROT SERPL-MCNC: 6.7 G/DL
PROTHROMBIN TIME: 10.7 SEC
RBC # BLD AUTO: 4.58 M/UL
RETIRED EF AND QEF - SEE NOTES: 55 (ref 55–65)
SODIUM SERPL-SCNC: 144 MMOL/L
TRICUSPID VALVE REGURGITATION: NORMAL
TRIGL SERPL-MCNC: 156 MG/DL
TROPONIN I SERPL DL<=0.01 NG/ML-MCNC: 0.11 NG/ML
TROPONIN I SERPL DL<=0.01 NG/ML-MCNC: 0.19 NG/ML
TROPONIN I SERPL DL<=0.01 NG/ML-MCNC: 0.27 NG/ML
WBC # BLD AUTO: 10.75 K/UL

## 2018-07-18 PROCEDURE — 36415 COLL VENOUS BLD VENIPUNCTURE: CPT

## 2018-07-18 PROCEDURE — 25000003 PHARM REV CODE 250: Performed by: HOSPITALIST

## 2018-07-18 PROCEDURE — 93010 ELECTROCARDIOGRAM REPORT: CPT | Mod: ,,, | Performed by: INTERNAL MEDICINE

## 2018-07-18 PROCEDURE — 93005 ELECTROCARDIOGRAM TRACING: CPT | Mod: 59

## 2018-07-18 PROCEDURE — 96361 HYDRATE IV INFUSION ADD-ON: CPT

## 2018-07-18 PROCEDURE — 25000003 PHARM REV CODE 250: Performed by: INTERNAL MEDICINE

## 2018-07-18 PROCEDURE — 83735 ASSAY OF MAGNESIUM: CPT

## 2018-07-18 PROCEDURE — 25000003 PHARM REV CODE 250

## 2018-07-18 PROCEDURE — 63600175 PHARM REV CODE 636 W HCPCS

## 2018-07-18 PROCEDURE — 85610 PROTHROMBIN TIME: CPT

## 2018-07-18 PROCEDURE — 84484 ASSAY OF TROPONIN QUANT: CPT

## 2018-07-18 PROCEDURE — 85025 COMPLETE CBC W/AUTO DIFF WBC: CPT

## 2018-07-18 PROCEDURE — G0378 HOSPITAL OBSERVATION PER HR: HCPCS

## 2018-07-18 PROCEDURE — 93306 TTE W/DOPPLER COMPLETE: CPT

## 2018-07-18 PROCEDURE — 80061 LIPID PANEL: CPT

## 2018-07-18 PROCEDURE — 93306 TTE W/DOPPLER COMPLETE: CPT | Mod: 26,,, | Performed by: INTERNAL MEDICINE

## 2018-07-18 PROCEDURE — 93458 L HRT ARTERY/VENTRICLE ANGIO: CPT

## 2018-07-18 PROCEDURE — 93458 L HRT ARTERY/VENTRICLE ANGIO: CPT | Mod: 26,,, | Performed by: INTERNAL MEDICINE

## 2018-07-18 PROCEDURE — 84484 ASSAY OF TROPONIN QUANT: CPT | Mod: 91

## 2018-07-18 PROCEDURE — 63600175 PHARM REV CODE 636 W HCPCS: Performed by: INTERNAL MEDICINE

## 2018-07-18 PROCEDURE — 84100 ASSAY OF PHOSPHORUS: CPT

## 2018-07-18 PROCEDURE — 80053 COMPREHEN METABOLIC PANEL: CPT

## 2018-07-18 PROCEDURE — 99220 PR INITIAL OBSERVATION CARE,LEVL III: CPT | Mod: ,,, | Performed by: INTERNAL MEDICINE

## 2018-07-18 PROCEDURE — 83036 HEMOGLOBIN GLYCOSYLATED A1C: CPT

## 2018-07-18 RX ORDER — ONDANSETRON 2 MG/ML
4 INJECTION INTRAMUSCULAR; INTRAVENOUS EVERY 12 HOURS PRN
Status: DISCONTINUED | OUTPATIENT
Start: 2018-07-18 | End: 2018-07-18 | Stop reason: HOSPADM

## 2018-07-18 RX ORDER — ENOXAPARIN SODIUM 150 MG/ML
1 INJECTION SUBCUTANEOUS
Status: DISCONTINUED | OUTPATIENT
Start: 2018-07-18 | End: 2018-07-18

## 2018-07-18 RX ORDER — NITROGLYCERIN 0.4 MG/1
0.4 TABLET SUBLINGUAL EVERY 5 MIN PRN
Status: DISCONTINUED | OUTPATIENT
Start: 2018-07-18 | End: 2018-07-18 | Stop reason: HOSPADM

## 2018-07-18 RX ORDER — RAMELTEON 8 MG/1
8 TABLET ORAL NIGHTLY PRN
Status: DISCONTINUED | OUTPATIENT
Start: 2018-07-18 | End: 2018-07-18 | Stop reason: HOSPADM

## 2018-07-18 RX ORDER — ACETAMINOPHEN 325 MG/1
650 TABLET ORAL EVERY 4 HOURS PRN
Status: DISCONTINUED | OUTPATIENT
Start: 2018-07-18 | End: 2018-07-18 | Stop reason: HOSPADM

## 2018-07-18 RX ORDER — CLOPIDOGREL 300 MG/1
300 TABLET, FILM COATED ORAL ONCE
Status: COMPLETED | OUTPATIENT
Start: 2018-07-18 | End: 2018-07-18

## 2018-07-18 RX ORDER — SODIUM CHLORIDE 9 MG/ML
INJECTION, SOLUTION INTRAVENOUS CONTINUOUS
Status: ACTIVE | OUTPATIENT
Start: 2018-07-18 | End: 2018-07-18

## 2018-07-18 RX ORDER — ACETAMINOPHEN 500 MG
500 TABLET ORAL EVERY 6 HOURS PRN
Status: DISCONTINUED | OUTPATIENT
Start: 2018-07-18 | End: 2018-07-18 | Stop reason: HOSPADM

## 2018-07-18 RX ORDER — QUETIAPINE FUMARATE 100 MG/1
100 TABLET, FILM COATED ORAL DAILY
Status: DISCONTINUED | OUTPATIENT
Start: 2018-07-18 | End: 2018-07-18 | Stop reason: HOSPADM

## 2018-07-18 RX ORDER — ATROPINE SULFATE 0.1 MG/ML
0.5 INJECTION INTRAVENOUS
Status: DISCONTINUED | OUTPATIENT
Start: 2018-07-18 | End: 2018-07-18 | Stop reason: HOSPADM

## 2018-07-18 RX ORDER — ASPIRIN 81 MG/1
81 TABLET ORAL DAILY
Status: DISCONTINUED | OUTPATIENT
Start: 2018-07-18 | End: 2018-07-18 | Stop reason: HOSPADM

## 2018-07-18 RX ORDER — MORPHINE SULFATE 4 MG/ML
2 INJECTION, SOLUTION INTRAMUSCULAR; INTRAVENOUS EVERY 10 MIN PRN
Status: DISCONTINUED | OUTPATIENT
Start: 2018-07-18 | End: 2018-07-18 | Stop reason: HOSPADM

## 2018-07-18 RX ORDER — AMOXICILLIN 250 MG
1 CAPSULE ORAL 2 TIMES DAILY PRN
Status: DISCONTINUED | OUTPATIENT
Start: 2018-07-18 | End: 2018-07-18 | Stop reason: HOSPADM

## 2018-07-18 RX ORDER — CARVEDILOL 6.25 MG/1
3.12 TABLET ORAL 2 TIMES DAILY
Qty: 30 TABLET | Refills: 11 | Status: ON HOLD | OUTPATIENT
Start: 2018-07-18 | End: 2018-09-02 | Stop reason: HOSPADM

## 2018-07-18 RX ORDER — HYDROCODONE BITARTRATE AND ACETAMINOPHEN 5; 325 MG/1; MG/1
1 TABLET ORAL EVERY 4 HOURS PRN
Status: DISCONTINUED | OUTPATIENT
Start: 2018-07-18 | End: 2018-07-18 | Stop reason: HOSPADM

## 2018-07-18 RX ORDER — MORPHINE SULFATE 4 MG/ML
2 INJECTION, SOLUTION INTRAMUSCULAR; INTRAVENOUS EVERY 4 HOURS PRN
Status: DISCONTINUED | OUTPATIENT
Start: 2018-07-18 | End: 2018-07-18 | Stop reason: HOSPADM

## 2018-07-18 RX ORDER — ATORVASTATIN CALCIUM 80 MG/1
80 TABLET, FILM COATED ORAL DAILY
Qty: 90 TABLET | Refills: 3 | Status: SHIPPED | OUTPATIENT
Start: 2018-07-19 | End: 2019-07-19

## 2018-07-18 RX ORDER — ASPIRIN 81 MG/1
81 TABLET ORAL DAILY
Refills: 0 | COMMUNITY
Start: 2018-07-19 | End: 2019-07-19

## 2018-07-18 RX ORDER — CARVEDILOL 6.25 MG/1
6.25 TABLET ORAL 2 TIMES DAILY
Status: DISCONTINUED | OUTPATIENT
Start: 2018-07-18 | End: 2018-07-18 | Stop reason: HOSPADM

## 2018-07-18 RX ORDER — ATORVASTATIN CALCIUM 40 MG/1
80 TABLET, FILM COATED ORAL DAILY
Status: DISCONTINUED | OUTPATIENT
Start: 2018-07-18 | End: 2018-07-18 | Stop reason: HOSPADM

## 2018-07-18 RX ORDER — DIVALPROEX SODIUM 250 MG/1
250 TABLET, DELAYED RELEASE ORAL 3 TIMES DAILY
Status: DISCONTINUED | OUTPATIENT
Start: 2018-07-18 | End: 2018-07-18 | Stop reason: HOSPADM

## 2018-07-18 RX ORDER — ONDANSETRON 2 MG/ML
8 INJECTION INTRAMUSCULAR; INTRAVENOUS EVERY 8 HOURS PRN
Status: DISCONTINUED | OUTPATIENT
Start: 2018-07-18 | End: 2018-07-18 | Stop reason: HOSPADM

## 2018-07-18 RX ORDER — CLOPIDOGREL BISULFATE 75 MG/1
75 TABLET ORAL DAILY
Status: DISCONTINUED | OUTPATIENT
Start: 2018-07-19 | End: 2018-07-18 | Stop reason: HOSPADM

## 2018-07-18 RX ORDER — ASPIRIN 325 MG
325 TABLET ORAL DAILY
Status: DISCONTINUED | OUTPATIENT
Start: 2018-07-18 | End: 2018-07-18

## 2018-07-18 RX ADMIN — SODIUM CHLORIDE: 0.9 INJECTION, SOLUTION INTRAVENOUS at 07:07

## 2018-07-18 RX ADMIN — CLOPIDOGREL BISULFATE 300 MG: 300 TABLET, FILM COATED ORAL at 07:07

## 2018-07-18 RX ADMIN — SODIUM CHLORIDE 5 ML/KG/HR: 0.9 INJECTION, SOLUTION INTRAVENOUS at 01:07

## 2018-07-18 RX ADMIN — DIVALPROEX SODIUM 250 MG: 250 TABLET, DELAYED RELEASE ORAL at 02:07

## 2018-07-18 RX ADMIN — CLOPIDOGREL BISULFATE 300 MG: 300 TABLET, FILM COATED ORAL at 06:07

## 2018-07-18 RX ADMIN — HYDROCODONE BITARTRATE AND ACETAMINOPHEN 1 TABLET: 5; 325 TABLET ORAL at 04:07

## 2018-07-18 RX ADMIN — ATORVASTATIN CALCIUM 80 MG: 40 TABLET, FILM COATED ORAL at 06:07

## 2018-07-18 RX ADMIN — ASPIRIN 81 MG: 81 TABLET, COATED ORAL at 08:07

## 2018-07-18 RX ADMIN — CARVEDILOL 6.25 MG: 6.25 TABLET, FILM COATED ORAL at 06:07

## 2018-07-18 RX ADMIN — DIVALPROEX SODIUM 250 MG: 250 TABLET, DELAYED RELEASE ORAL at 08:07

## 2018-07-18 RX ADMIN — ENOXAPARIN SODIUM 120 MG: 150 INJECTION SUBCUTANEOUS at 06:07

## 2018-07-18 NOTE — PLAN OF CARE
07/18/18 1740   Final Note   Assessment Type Final Discharge Note   Discharge Disposition Home   What phone number can be called within the next 1-3 days to see how you are doing after discharge? ([phone])   Hospital Follow Up  Appt(s) scheduled? Yes   Discharge plans and expectations educations in teach back method with documentation complete? Yes   Right Care Referral Info   Post Acute Recommendation No Care   Follow up appointment scheduled for pt with PCP.  All information (Date, time, location and contact info) placed in AVS and on DC sheet.  Information given and explained to pt, as well as instructed to call 24-48 hours prior to scheduled time if rescheduling needed.  Patient instructed to bring all meds currently taking in their original bottles along with insurance card and picture ID to all appointments. Patient instructed that someone from Dr Lozano's office will call with an appointment.  EDUCATION:  Things You are responsible For To Manage Your Care At Home:  1.    Getting your prescriptions filled   2.    Taking your medications as directed, DO NOT MISS ANY DOSES!  3.    Going to your follow-up doctor appointment. This is important because it  allow the doctor to monitor your progress and determine if  any changes need to made to your treatment plan.   Nursemaria guadalupe notified that all AM needs are met

## 2018-07-18 NOTE — DISCHARGE SUMMARY
[facility] Hospital Medicine  Discharge Summary      Patient Name: Vel Ordonez  MRN: [id number]  Admission Date: 7/17/2018  Hospital Length of Stay: 0 days  Discharge Date and Time:  07/18/2018 4:38 PM  Attending Physician: Irene Manzo MD   Discharging Provider: Buck Kline Jr, NP  Primary Care Provider: Primary Doctor No      HPI:   Mr. Vel Ordonez is a 32 y.o. male with Bipolar 1 disorder who presented initially to [facility] ED with complaints of chest pain for three days.  The pain is mid-sternal without radiation, is squeezing in quality, and is 5/10 in severity at its worst.  The pain is associated with dyspnea, diaphoresis, palpitations, and nausea without vomiting.  It is intermittent without any triggers and is without any alleviating or exacerbating factors.  He has not had any coughing, hemoptysis, nor any lower extremity pain or swelling.  He has never had similar issues in the past but does say that his dad had a heart attack at the age of 50 years.  Of note, he's had intermittent loss of consciousness for the past five days but denies that it's associated with his chest pain.  He denies any illicit drug use.    Procedure(s) (LRB):  Left heart cath R rad access, not before 9am (Left)      Hospital Course:   Mr. Ordonez was placed in observation for ACS rule out after presenting with chest pain.  EKG without evidence of acute ischemia, troponin elevated x3 with a peak of 0.266.  He was seen and evaluated by Cardiology and underwent left heart catheterization with showed normal coronaries and LV function.  Recommendation for ASA, beta blocker, and statin with outpatient follow up.  Prescriptions provided.  His pain resolved and did not recur.  Discharged home in stable condition to follow up with his PCP and Cardiology.     Consults:   Consults         Status Ordering Provider     Inpatient consult to Cardiology  Once     Provider:  Asher Lozano MD     Completed SANGEETHA PEPPER          No new Assessment & Plan notes have been filed under this hospital service since the last note was generated.  Service: Hospital Medicine    Final Active Diagnoses:    Diagnosis Date Noted POA    PRINCIPAL PROBLEM:  NSTEMI (non-ST elevated myocardial infarction) [I21.4] 07/17/2018 Yes    Bipolar 1 disorder [F31.9] 07/18/2018 Yes     Chronic    Elevated troponin [R74.8]  Yes      Problems Resolved During this Admission:    Diagnosis Date Noted Date Resolved POA       Discharged Condition: stable    Disposition: Home or Self Care    Follow Up:  Follow-up Information     [facility] On 7/23/2018.    Why:  Hospital follow up, please rive at 1:30 PM  Contact information:  [address] [address] [phone]             Asher Lozano MD.    Specialties:  Cardiology, INTERVENTIONAL CARDIOLOGY  Why:  OFFICE WILL CALL YOU WITH FOLLOW UP APPOINTMENT DATE AND TIME  Contact information:  [address] [facility]  [address]  [facility] [address] [phone]                 Patient Instructions:     Diet Cardiac     Other restrictions (specify):   Order Comments: No heavy lifting or strenuous activity for 2 weeks     No driving until:   Order Comments: Sunday 7/22/18         Significant Diagnostic Studies: Labs:   CMP   Recent Labs  Lab 07/18/18  0153      K 4.0      CO2 26   GLU 89   BUN 14   CREATININE 1.0   CALCIUM 9.2   PROT 6.7   ALBUMIN 3.8   BILITOT 0.4   ALKPHOS 59   AST 15   ALT 22   ANIONGAP 9   ESTGFRAFRICA >60   EGFRNONAA >60   , CBC   Recent Labs  Lab 07/17/18  2140 07/18/18  0153   WBC 14.22* 10.75   HGB 15.3 13.8*   HCT 45.9 42.4    324   , Lipid Panel   Lab Results   Component Value Date    CHOL 129 07/18/2018    HDL 20 (L) 07/18/2018    LDLCALC 77.8 07/18/2018    TRIG 156 (H) 07/18/2018    CHOLHDL 15.5 (L) 07/18/2018   , Troponin   Recent Labs  Lab 07/18/18  1517   TROPONINI 0.106*    and A1C:   Recent Labs  Lab  07/18/18  0808   HGBA1C 5.1     Angiography: Impression:  ACS  Normal cors/LV fxn  R rad vasband      Pending Diagnostic Studies:     Procedure Component Value Units Date/Time    EKG 12-LEAD [[id number]]     Order Status:  Sent Lab Status:  No result     EKG 12-LEAD [[id number]]     Order Status:  Sent Lab Status:  No result     EKG 12-LEAD [[id number]]     Order Status:  Sent Lab Status:  No result     EKG 12-LEAD [[id number]]     Order Status:  Sent Lab Status:  No result          Medications:  Reconciled Home Medications:      Medication List      START taking these medications    aspirin 81 MG EC tablet  Commonly known as:  ECOTRIN  Take 1 tablet (81 mg total) by mouth once daily.  Start taking on:  7/19/2018     atorvastatin 80 MG tablet  Commonly known as:  LIPITOR  Take 1 tablet (80 mg total) by mouth once daily.  Start taking on:  7/19/2018     carvedilol 6.25 MG tablet  Commonly known as:  COREG  Take 0.5 tablets (3.125 mg total) by mouth 2 (two) times daily.        CONTINUE taking these medications    divalproex 250 MG EC tablet  Commonly known as:  DEPAKOTE  Take 250 mg by mouth 3 (three) times daily. 250mg daily  250mg afternoon and 500mg nightly     QUEtiapine 100 MG Tab  Commonly known as:  SEROQUEL  Take by mouth every evening.            Indwelling Lines/Drains at time of discharge:   Lines/Drains/Airways          No matching active lines, drains, or airways          Time spent on the discharge of patient: less than 30 minutes  Patient was seen and examined on the date of discharge and determined to be suitable for discharge.         Buck Kline Jr NP  Department of Hospital Medicine  [facility]

## 2018-07-18 NOTE — ED NOTES
Gave verbal report on this pt. Via telephone to JANINE Erazo at telephone number:  [phone], and all questions answered.

## 2018-07-18 NOTE — PROGRESS NOTES
Patient returned to unit from scheduled procedure. Patient awake, alert, oriented. Patient without c/o discomfort. Tele monitoring in progress. No apparent distress noted.  Vas ban to R wrist intact.  No bleeding or hematoma noted.

## 2018-07-18 NOTE — NURSING
Dr Vaibhav LAMB. To repeat labs now because our lab results may be different. New order for Troponin now and q6 x3, cbc, cmp, mg, phos now.

## 2018-07-18 NOTE — BRIEF OP NOTE
[facility]  Brief Operative Note     SUMMARY     Surgery Date: 7/18/2018     Surgeon(s) and Role:     * Asher Lozano MD - Primary    Assisting Surgeon: None    Pre-op Diagnosis:  ACS/NSTEMI (non-ST elevated myocardial infarction) [I21.4]    Post-op Diagnosis:  same    Procedure(s) (LRB):  Left heart cath R rad access, not before 9am (Left)    Anesthesia: RN IV Sedation    Description of the findings of the procedure: see below    Findings/Key Components:  LVEDP: 12mmHg  LVEF: 55% by echo  Wall Motion: normal by echo    Dominance: Left  LM: normal  LAD: normal  Ramus: normal  LCx: dom, normal  RCA: nondom, normal    Hemostasis:  R Radial band    Impression:  ACS  Normal cors/LV fxn  R rad vasband    Plan:  Cont med rx  ASA 81mg qd  Plavix 75mg qd for 1 year (thru 7/2018)  BBl/Statin +/- ACEi  Home today  Follow up with Dr. Lozano 2-3 weeks    Estimated Blood Loss: <50cc         Specimens: None

## 2018-07-18 NOTE — NURSING
In preparation for discharge, d/c'ed patient's tele monitor,  NSR prior to removal, d/c'ed patient's saline lock, applied pressure to site, secured site with tape and gauze. Vas band removed per order. Site is clean, dry, intact, no bleeding or hematoma. Site covered with gauze and clear Tegaderm.

## 2018-07-18 NOTE — NURSING
Patient transported to cath lab for scheudled procedure. No complaints, no acute distress noted. IV fluids sent with patient. Wife notified of transfer to procedure. Will monitor upon return to unit.

## 2018-07-18 NOTE — ED PROVIDER NOTES
"Encounter Date: 7/17/2018    SCRIBE #1 NOTE: I, Edin Springer, am scribing for, and in the presence of,  Dr. Joe. I have scribed the entire note.       History     Chief Complaint   Patient presents with    Chest Pain     pt presents to ER with c/o chest pain intermittently accompanied by periods of unresponsiveness, and left arm numbness  that started over the weekend.  He denies pain at present and had started taking Depakote a week ago.       Time patient was seen by the provider: 8:53 PM     Patient with bipolar disorder. Patient recently was hospitalized 2 weeks ago for suicidal ideation (Prozac and Lithium discontinued at that time, Patient is now taking Depakote 1000 mg daily dividend TID) presents to the ED complaining of acute intermittent "passing out spells" that first began on Friday per the patients girlfriend. Patient was laying in bed watching television when she states he lost consciousness and became stiff, drenched in sweat and then had staring spells and did not answer questions for what lasted for a total of 10 minutes.  She denies seeing him shake. She said there was no tongue biting or incontinence of bowel or bladder. EMS was not called. Patient had a mild headache after that that resolved spontaneously The next day patient states he felt completely normal until laying down in bed that night. Girlfriend states he passed out twice and hour apart from one another. She said each episode lasted about 10 minutes and again did not involved tongue biting or incontinence. Patient states he felt normal Sunday. While at work today at around 11 a.m., he called his girlfriend stating "he didn't feel right". He reports that he felt mid sternal short chest pain that lasted about 10 minutes without nausea, vomiting, SOB, or syncope. He reports intermittent left arm tingling. All symptoms are currently resolved except a 2/10 frontal headache.     Patient states his father had an MI in his fifties.  " Patient does not smoke, drink or do drugs.    Denies SI/HI or mood disturbance today.               The history is provided by the spouse.     Review of patient's allergies indicates:  No Known Allergies  Past Medical History:   Diagnosis Date    Bipolar 1 disorder 2011    Hypertension      History reviewed. No pertinent surgical history.  Family History   Problem Relation Age of Onset    Cancer Mother     Heart disease Father     Diabetes Father      Social History   Substance Use Topics    Smoking status: Never Smoker    Smokeless tobacco: Never Used    Alcohol use No     Review of Systems   Constitutional: Positive for diaphoresis. Negative for fever.   Eyes: Negative for visual disturbance.   Cardiovascular: Positive for chest pain.   Gastrointestinal: Negative for abdominal pain, nausea and vomiting.   Genitourinary: Negative for difficulty urinating.   Musculoskeletal: Negative for back pain and neck stiffness.   Neurological: Positive for syncope, numbness (Left arm ) and headaches.   Psychiatric/Behavioral: The patient is not nervous/anxious.    All other systems reviewed and are negative.      Physical Exam     Initial Vitals   BP Pulse Resp Temp SpO2   07/17/18 2101 07/17/18 2101 07/17/18 2101 07/17/18 2250 07/17/18 2101   134/88 89 18 98.2 °F (36.8 °C) 98 %      MAP       --                Physical Exam    Nursing note and vitals reviewed.  Constitutional: He appears well-developed and well-nourished. He is not diaphoretic. No distress.   HENT:   Head: Normocephalic and atraumatic.   Mouth/Throat: Oropharynx is clear and moist.   Eyes: Conjunctivae and EOM are normal.   Neck: Normal range of motion and phonation normal. Neck supple. No stridor present.   Cardiovascular: Regular rhythm and intact distal pulses.   Pulmonary/Chest: No accessory muscle usage or stridor. No tachypnea. No respiratory distress.   Abdominal: Soft. Bowel sounds are normal. He exhibits no distension. There is no tenderness.    Musculoskeletal: Normal range of motion. He exhibits no tenderness.   Neurological: He is alert and oriented to person, place, and time. He has normal strength. No cranial nerve deficit or sensory deficit. Coordination and gait normal. GCS eye subscore is 4. GCS verbal subscore is 5. GCS motor subscore is 6.   Skin: Skin is warm and intact.   Psychiatric: He has a normal mood and affect.         ED Course   Critical Care  Date/Time: 7/17/2018 10:52 PM  Performed by: GAIL DIAZ  Authorized by: CHYNA BETTENCOURT   Direct patient critical care time: 10 minutes  Additional history critical care time: 10 minutes  Ordering / reviewing critical care time: 10 minutes  Documentation critical care time: 10 minutes  Consulting other physicians critical care time: 10 minutes  Total critical care time (exclusive of procedural time) : 50 minutes  Critical care was necessary to treat or prevent imminent or life-threatening deterioration of the following conditions: cardiac failure and renal failure.  Critical care was time spent personally by me on the following activities: examination of patient, ordering and review of laboratory studies, ordering and performing treatments and interventions, re-evaluation of patient's condition, obtaining history from patient or surrogate, development of treatment plan with patient or surrogate, discussions with consultants, ordering and review of radiographic studies and evaluation of patient's response to treatment.        Labs Reviewed   TROPONIN ISTAT - Abnormal; Notable for the following:        Result Value    POC Cardiac Troponin I 0.22 (*)     All other components within normal limits   CBC W/ AUTO DIFFERENTIAL - Abnormal; Notable for the following:     WBC 14.22 (*)     Gran # (ANC) 9.9 (*)     Mono # 1.9 (*)     Lymph% 15.5 (*)     All other components within normal limits   POCT URINALYSIS W/O SCOPE - Abnormal; Notable for the following:     Glucose, UA Negative (*)     Bilirubin, UA  Negative (*)     Ketones, UA Negative (*)     Blood, UA Negative (*)     Protein, UA Negative (*)     Nitrite, UA Negative (*)     Leukocytes, UA Negative (*)     All other components within normal limits   POCT CMP - Abnormal; Notable for the following:     POC Creatinine 1.3 (*)     All other components within normal limits   POCT D DIMER - Abnormal; Notable for the following:     POC D-DI <100 (*)     All other components within normal limits   VALPROIC ACID   DRUG SCREEN PANEL, URINE EMERGENCY   CK   CK   POCT CBC   POCT CMP   POCT TROPONIN   POCT B-TYPE NATRIURETIC PEPTIDE (BNP)   POCT B-TYPE NATRIURETIC PEPTIDE (BNP)     EKG Readings: (Independently Interpreted)   Initial Reading: No STEMI. Rhythm: Normal Sinus Rhythm. Heart Rate: 89. Ectopy: No Ectopy. Conduction: Normal. ST Segments: Normal ST Segments. T Waves: Normal. Axis: Left Axis Deviation. Q Waves: I and AVL.       Imaging Results          X-Ray Chest 1 View (Final result)  Result time 07/17/18 22:44:10    Final result by Jeronimo Vu MD (07/17/18 22:44:10)                 Impression:      No acute cardiopulmonary process identified.      Electronically signed by: Jeronimo Vu MD  Date:    07/17/2018  Time:    22:44             Narrative:    EXAMINATION:  XR CHEST 1 VIEW    CLINICAL HISTORY:  chest pain;    TECHNIQUE:  Single frontal view of the chest was performed.    COMPARISON:  November 2011.    FINDINGS:  Cardiac silhouette is normal in size.  Lungs are symmetrically expanded.  No evidence of focal consolidative process, pneumothorax, or significant effusion.  No acute osseous abnormality identified.                               CT Head Without Contrast (Final result)  Result time 07/17/18 22:03:19    Final result by Wilda Spaulding MD (07/17/18 22:03:19)                 Impression:      No CT evidence of acute intracranial abnormality. Clinical correlation and further evaluation as warranted.      Electronically signed by: Wilda Spaulding  MD  Date:    07/17/2018  Time:    22:03             Narrative:    EXAMINATION:  CT HEAD WITHOUT CONTRAST    CLINICAL HISTORY:  Syncope/fainting;    TECHNIQUE:  Low dose axial images were obtained through the head.  Coronal and sagittal reformations were also performed. Contrast was not administered.    COMPARISON:  None.    FINDINGS:  There is no acute intracranial hemorrhage, hydrocephalus, midline shift or mass effect. Gray-white matter differentiation appears maintained. The basal cisterns are patent. The mastoid air cells and paranasal sinuses are clear of acute process. The visualized bones of the calvarium demonstrate no acute osseous abnormality.                                            Scribe Attestation:   Scribe #1: I performed the above scribed service and the documentation accurately describes the services I performed. I attest to the accuracy of the note.             Labs Reviewed  Admission on 07/17/2018, Discharged on 07/18/2018   Component Date Value Ref Range Status    Valproic Acid Lvl 07/17/2018 61.6  50.0 - 100.0 ug/mL Final    Comment: Valproic Acid (ug/ml)  Toxic:   >100.0 ug/ml      Benzodiazepines 07/17/2018 Negative   Final    Methadone metabolites 07/17/2018 Negative   Final    Cocaine (Metab.) 07/17/2018 Negative   Final    Opiate Scrn, Ur 07/17/2018 Negative   Final    Barbiturate Screen, Ur 07/17/2018 Negative   Final    Amphetamine Screen, Ur 07/17/2018 Negative   Final    THC 07/17/2018 Negative   Final    Phencyclidine 07/17/2018 Negative   Final    Creatinine, Random Ur 07/17/2018 119.0  23.0 - 375.0 mg/dL Final    Comment: The random urine reference ranges provided were established   for 24 hour urine collections.  No reference ranges exist for  random urine specimens.  Correlate clinically.      Toxicology Information 07/17/2018 SEE COMMENT   Final    Comment: This screen includes the following classes of drugs at the   listed cut-off:  Benzodiazepines                   200 ng/ml  Methadone                        300 ng/ml  Cocaine metabolite               300 ng/ml  Opiates                          300 ng/ml  Barbiturates                     200 ng/ml  Amphetamines                    1000 ng/ml  Marijuana metabs (THC)            50 ng/ml  Phencyclidine (PCP)               25 ng/ml  High concentrations of Diphenhydramine may cross-react with  Phencyclidine PCP screening immunoassay giving a false   positive result.  High concentrations of Methylenedioxymethamphetamine (MDMA aka  Ectasy) and other structurally similar compounds may cross-   react with the Amphetamine/Methamphetamine screening   immunoassay giving a false positive result.  A metabolite of the anti-HIV drug Sustiva () may cause  false positive results in the Marijuana metabolite (THC)   screening assay.  Note: This exception list includes only more common   interferants i                           n toxicology screen testing.  Because of many   cross-reactantspositive results on toxicology drug screens   should be confirmed whenever results do not correlate with   clinical presentation.  This report is intended for use in clinical monitoring and  management of patients. It is not intended for use in   employment related drug testing.  Because of any cross-reactants, positive results on toxicology  drug screens should be confirmed whenever results do not  correlate with clinical presentation.  Presumptive positive results are unconfirmed and may be used   only for medical purposes.      POC Cardiac Troponin I 07/17/2018 0.22* <0.09 ng/mL Final    Sample 07/17/2018 UNK   Final    WBC 07/17/2018 14.22* 3.90 - 12.70 K/uL Final    RBC 07/17/2018 5.03  4.60 - 6.20 M/uL Final    Hemoglobin 07/17/2018 15.3  14.0 - 18.0 g/dL Final    Hematocrit 07/17/2018 45.9  40.0 - 54.0 % Final    MCV 07/17/2018 91  82 - 98 fL Final    MCH 07/17/2018 30.4  27.0 - 31.0 pg Final    MCHC 07/17/2018 33.3  32.0 - 36.0 g/dL Final     RDW 07/17/2018 14.1  11.5 - 14.5 % Final    Platelets 07/17/2018 350  150 - 350 K/uL Final    MPV 07/17/2018 10.3  9.2 - 12.9 fL Final    Gran # (ANC) 07/17/2018 9.9* 1.8 - 7.7 K/uL Final    Lymph # 07/17/2018 2.2  1.0 - 4.8 K/uL Final    Mono # 07/17/2018 1.9* 0.3 - 1.0 K/uL Final    Eos # 07/17/2018 0.1  0.0 - 0.5 K/uL Final    Baso # 07/17/2018 0.05  0.00 - 0.20 K/uL Final    Gran% 07/17/2018 69.6  38.0 - 73.0 % Final    Lymph% 07/17/2018 15.5* 18.0 - 48.0 % Final    Mono% 07/17/2018 13.5  4.0 - 15.0 % Final    Eosinophil% 07/17/2018 1.0  0.0 - 8.0 % Final    Basophil% 07/17/2018 0.4  0.0 - 1.9 % Final    Differential Method 07/17/2018 Automated   Final    POC B-Type Natriuretic Peptide 07/17/2018 28.7  0.0 - 100.0 pg/mL Final    Glucose, UA 07/17/2018 Negative*  Final    Bilirubin, UA 07/17/2018 Negative*  Final    Ketones, UA 07/17/2018 Negative*  Final    Spec Grav UA 07/17/2018 1.015   Final    Blood, UA 07/17/2018 Negative*  Final    PH, UA 07/17/2018 6.5   Final    Protein, UA 07/17/2018 Negative*  Final    Urobilinogen, UA 07/17/2018 1.0  E.U./dL Final    Nitrite, UA 07/17/2018 Negative*  Final    Leukocytes, UA 07/17/2018 Negative*  Final    Color, UA 07/17/2018 Yellow   Final    Clarity, UA 07/17/2018 Clear   Final    Albumin, POC 07/17/2018 3.9  3.3 - 5.5 g/dL Final    Alkaline Phosphatase, POC 07/17/2018 64  42 - 141 U/L Final    ALT (SGPT), POC 07/17/2018 25  10 - 47 U/L Final    AST (SGOT), POC 07/17/2018 22  11 - 38 U/L Final    POC BUN 07/17/2018 14  7 - 22 mg/dL Final    Calcium, POC 07/17/2018 8.7  8.0 - 10.3 mg/dL Final    POC Chloride 07/17/2018 104  98 - 108 mmol/L Final    POC Creatinine 07/17/2018 1.3* 0.6 - 1.2 mg/dL Final    POC Glucose 07/17/2018 80  73 - 118 mg/dL Final    POC Potassium 07/17/2018 4.2  3.6 - 5.1 mmol/L Final    POC Sodium 07/17/2018 145  128 - 145 mmol/L Final    Bilirubin 07/17/2018 0.5  0.2 - 1.6 mg/dL Final    POC TCO2  07/17/2018 26  18 - 33 mmol/L Final    Protein 07/17/2018 7.0  6.4 - 8.1 g/dL Final    POC D-DI 07/17/2018 <100* 0.0 - 450.0 ng/mL Final    CPK 07/17/2018 112  20 - 200 U/L Final    Specimen slightly hemolyzed    Troponin I 07/18/2018 0.266* 0.000 - 0.026 ng/mL Final    Comment: The reference interval for Troponin I represents the 99th percentile   cutoff   for our facility and is consistent with 3rd generation assay   performance.      Troponin I 07/18/2018 0.190* 0.000 - 0.026 ng/mL Final    Comment: The reference interval for Troponin I represents the 99th percentile   cutoff   for our facility and is consistent with 3rd generation assay   performance.      WBC 07/18/2018 10.75  3.90 - 12.70 K/uL Final    RBC 07/18/2018 4.58* 4.60 - 6.20 M/uL Final    Hemoglobin 07/18/2018 13.8* 14.0 - 18.0 g/dL Final    Hematocrit 07/18/2018 42.4  40.0 - 54.0 % Final    MCV 07/18/2018 93  82 - 98 fL Final    MCH 07/18/2018 30.1  27.0 - 31.0 pg Final    MCHC 07/18/2018 32.5  32.0 - 36.0 g/dL Final    RDW 07/18/2018 14.3  11.5 - 14.5 % Final    Platelets 07/18/2018 324  150 - 350 K/uL Final    MPV 07/18/2018 9.9  9.2 - 12.9 fL Final    Gran # (ANC) 07/18/2018 6.2  1.8 - 7.7 K/uL Final    Lymph # 07/18/2018 3.0  1.0 - 4.8 K/uL Final    Mono # 07/18/2018 1.3* 0.3 - 1.0 K/uL Final    Eos # 07/18/2018 0.2  0.0 - 0.5 K/uL Final    Baso # 07/18/2018 0.04  0.00 - 0.20 K/uL Final    Gran% 07/18/2018 57.5  38.0 - 73.0 % Final    Lymph% 07/18/2018 28.3  18.0 - 48.0 % Final    Mono% 07/18/2018 11.8  4.0 - 15.0 % Final    Eosinophil% 07/18/2018 1.6  0.0 - 8.0 % Final    Basophil% 07/18/2018 0.4  0.0 - 1.9 % Final    Differential Method 07/18/2018 Automated   Final    Sodium 07/18/2018 144  136 - 145 mmol/L Final    Potassium 07/18/2018 4.0  3.5 - 5.1 mmol/L Final    Chloride 07/18/2018 109  95 - 110 mmol/L Final    CO2 07/18/2018 26  23 - 29 mmol/L Final    Glucose 07/18/2018 89  70 - 110 mg/dL Final    BUN,  Bld 07/18/2018 14  6 - 20 mg/dL Final    Creatinine 07/18/2018 1.0  0.5 - 1.4 mg/dL Final    Calcium 07/18/2018 9.2  8.7 - 10.5 mg/dL Final    Total Protein 07/18/2018 6.7  6.0 - 8.4 g/dL Final    Albumin 07/18/2018 3.8  3.5 - 5.2 g/dL Final    Total Bilirubin 07/18/2018 0.4  0.1 - 1.0 mg/dL Final    Comment: For infants and newborns, interpretation of results should be based  on gestational age, weight and in agreement with clinical  observations.  Premature Infant recommended reference ranges:  Up to 24 hours.............<8.0 mg/dL  Up to 48 hours............<12.0 mg/dL  3-5 days..................<15.0 mg/dL  6-29 days.................<15.0 mg/dL      Alkaline Phosphatase 07/18/2018 59  55 - 135 U/L Final    AST 07/18/2018 15  10 - 40 U/L Final    ALT 07/18/2018 22  10 - 44 U/L Final    Anion Gap 07/18/2018 9  8 - 16 mmol/L Final    eGFR if African American 07/18/2018 >60  >60 mL/min/1.73 m^2 Final    eGFR if non African American 07/18/2018 >60  >60 mL/min/1.73 m^2 Final    Comment: Calculation used to obtain the estimated glomerular filtration  rate (eGFR) is the CKD-EPI equation.       Magnesium 07/18/2018 2.3  1.6 - 2.6 mg/dL Final    Phosphorus 07/18/2018 3.6  2.7 - 4.5 mg/dL Final    Hemoglobin A1C 07/18/2018 5.1  4.0 - 5.6 % Final    Comment: ADA Screening Guidelines:  5.7-6.4%  Consistent with prediabetes  >or=6.5%  Consistent with diabetes  High levels of fetal hemoglobin interfere with the HbA1C  assay. Heterozygous hemoglobin variants (HbS, HgC, etc)do  not significantly interfere with this assay.   However, presence of multiple variants may affect accuracy.      Estimated Avg Glucose 07/18/2018 100  68 - 131 mg/dL Final    Cholesterol 07/18/2018 129  120 - 199 mg/dL Final    Comment: The National Cholesterol Education Program (NCEP) has set the  following guidelines (reference ranges) for Cholesterol:  Optimal.....................<200 mg/dL  Borderline High.............200-239  mg/dL  High........................> or = 240 mg/dL      Triglycerides 07/18/2018 156* 30 - 150 mg/dL Final    Comment: The National Cholesterol Education Program (NCEP) has set the  following guidelines (reference values) for triglycerides:  Normal......................<150 mg/dL  Borderline High.............150-199 mg/dL  High........................200-499 mg/dL      HDL 07/18/2018 20* 40 - 75 mg/dL Final    Comment: The National Cholesterol Education Program (NCEP) has set the  following guidelines (reference values) for HDL Cholesterol:  Low...............<40 mg/dL  Optimal...........>60 mg/dL      LDL Cholesterol 07/18/2018 77.8  63.0 - 159.0 mg/dL Final    Comment: The National Cholesterol Education Program (NCEP) has set the  following guidelines (reference values) for LDL Cholesterol:  Optimal.......................<130 mg/dL  Borderline High...............130-159 mg/dL  High..........................160-189 mg/dL  Very High.....................>190 mg/dL      HDL/Chol Ratio 07/18/2018 15.5* 20.0 - 50.0 % Final    Total Cholesterol/HDL Ratio 07/18/2018 6.5* 2.0 - 5.0 Final    Non-HDL Cholesterol 07/18/2018 109  mg/dL Final    Comment: Risk category and Non-HDL cholesterol goals:  Coronary heart disease (CHD)or equivalent (10-year risk of CHD >20%):  Non-HDL cholesterol goal     <130 mg/dL  Two or more CHD risk factors and 10-year risk of CHD <= 20%:  Non-HDL cholesterol goal     <160 mg/dL  0 to 1 CHD risk factor:  Non-HDL cholesterol goal     <190 mg/dL      Prothrombin Time 07/18/2018 10.7  9.0 - 12.5 sec Final    INR 07/18/2018 1.0  0.8 - 1.2 Final    Comment: Coumadin Therapy:  2.0 - 3.0 for INR for all indicators except mechanical heart valves  and antiphospholipid syndromes which should use 2.5 - 3.5.      EF 07/18/2018 55  55 - 65 Final    Mitral Valve Regurgitation 07/18/2018 TRIVIAL   Final    Est. PA Systolic Pressure 07/18/2018 28.79   Final    Pericardial Effusion 07/18/2018 NONE    Final    Mitral Valve Mobility 07/18/2018 NORMAL   Final    Tricuspid Valve Regurgitation 07/18/2018 TRIVIAL   Final    Troponin I 07/18/2018 0.106* 0.000 - 0.026 ng/mL Final    Comment: The reference interval for Troponin I represents the 99th percentile   cutoff   for our facility and is consistent with 3rd generation assay   performance.          Imaging Reviewed    Imaging Results          X-Ray Chest 1 View (Final result)  Result time 07/17/18 22:44:10    Final result by Jeronimo Vu MD (07/17/18 22:44:10)                 Impression:      No acute cardiopulmonary process identified.      Electronically signed by: Jeronimo Vu MD  Date:    07/17/2018  Time:    22:44             Narrative:    EXAMINATION:  XR CHEST 1 VIEW    CLINICAL HISTORY:  chest pain;    TECHNIQUE:  Single frontal view of the chest was performed.    COMPARISON:  November 2011.    FINDINGS:  Cardiac silhouette is normal in size.  Lungs are symmetrically expanded.  No evidence of focal consolidative process, pneumothorax, or significant effusion.  No acute osseous abnormality identified.                               CT Head Without Contrast (Final result)  Result time 07/17/18 22:03:19    Final result by Wilda Spaulding MD (07/17/18 22:03:19)                 Impression:      No CT evidence of acute intracranial abnormality. Clinical correlation and further evaluation as warranted.      Electronically signed by: Wilda Spaulding MD  Date:    07/17/2018  Time:    22:03             Narrative:    EXAMINATION:  CT HEAD WITHOUT CONTRAST    CLINICAL HISTORY:  Syncope/fainting;    TECHNIQUE:  Low dose axial images were obtained through the head.  Coronal and sagittal reformations were also performed. Contrast was not administered.    COMPARISON:  None.    FINDINGS:  There is no acute intracranial hemorrhage, hydrocephalus, midline shift or mass effect. Gray-white matter differentiation appears maintained. The basal cisterns are patent. The  mastoid air cells and paranasal sinuses are clear of acute process. The visualized bones of the calvarium demonstrate no acute osseous abnormality.                                Medications given in ED    Medications   0.9%  NaCl infusion ( Intravenous Stopped 7/18/18 1200)   aspirin tablet 325 mg (325 mg Oral Given 7/17/18 2222)   0.9%  NaCl infusion (0 mLs Intravenous Stopped 7/18/18 0049)   enoxaparin injection 100 mg (100 mg Subcutaneous Given 7/17/18 2256)   clopidogrel tablet 300 mg (300 mg Oral Given 7/18/18 0640)   clopidogrel tablet 300 mg (300 mg Oral Given 7/18/18 0715)   sodium chloride 0.9% bolus (5 mL/kg/hr × 114.4 kg Intravenous New Bag 7/18/18 1312)       This document was produced by a scribe under my direction and in my presence. I agree with the content of the note and have made any necessary edits.     Mari Joe MD         Note was created using voice recognition software. Note may have occasional typographical errors that may not have been identified and edited despite good jayy initial review prior to signing.       Clinical Impression:     1. Elevated troponin    2. KELLIE (acute kidney injury)    3. Syncope, unspecified syncope type    4. NSTEMI (non-ST elevated myocardial infarction)    5. Bipolar 1 disorder    6. Acute nasopharyngitis (common cold)            Disposition:   Disposition: Placed in Observation  Condition: Stable  Reason for referral: elevated Troponin, syncope  [facility] - Accepted by Irene Joe MD  08/09/18 0062

## 2018-07-18 NOTE — PLAN OF CARE
Problem: Cardiac: ACS (Acute Coronary Syndrome) (Adult)  Intervention: Manage Acute Chest Pain   07/18/18 0751   Chest Pain Assessment   Intervention cardiac enzymes drawn;cardiac monitoring continued;12-lead ECG obtained;thrombolytics     Intervention: Monitor ECG for Changes/Signs of Ischemia   07/18/18 0129 07/18/18 0710   Vitals   Pulse --  71   ECG   Lead Monitored Lead II --    Rhythm normal sinus rhythm --    OR Interval (Sec) 0.16 --    QRS Interval (Sec) 0.11 --    ST Segment normal --    T-Wave normal --      Intervention: Optimize Myocardial Oxygenation/Perfusion   07/18/18 0751   Activity   Activity Management activity adjusted per tolerance*   Respiratory Interventions   Airway/Ventilation Management calming measures promoted     Intervention: Prevent/Manage Thrombi/Emboli   07/18/18 0117 07/18/18 0751   Minimize Embolism Risk   VTE Prevention/Management --  bleeding precautions maintained   Safety Interventions   Bleeding Precautions --  monitored for signs of bleeding   OTHER   VTE Required Core Measure Pharmacological prophylaxis initiated/maintained --      Intervention: Support Psychosocial Response to Life-changing Event/Hospitalization   07/18/18 0751   Coping/Psychosocial Interventions   Supportive Measures positive reinforcement provided;verbalization of feelings encouraged     Intervention: Monitor/Manage Fluid Balance  NPO for cardiology consult    Goal: Signs and Symptoms of Listed Potential Problems Will be Absent, Minimized or Managed (Cardiac: ACS)  Signs and symptoms of listed potential problems will be absent, minimized or managed by discharge/transition of care (reference Cardiac: ACS (Acute Coronary Syndrome) (Adult) CPG).   Outcome: Ongoing (interventions implemented as appropriate)   07/18/18 0751   Cardiac: ACS (Acute Coronary Syndrome)   Problems Assessed (Acute Coronary Syndrome) all   Problems Present (Acute Coronary Syndrome) none

## 2018-07-18 NOTE — HPI
32 y.o. male with Bipolar 1 disorder who presented initially to [facility] ED with complaints of chest pain for three days.  The pain is mid-sternal without radiation, is squeezing in quality, and is 5/10 in severity at its worst.  The pain is associated with dyspnea, diaphoresis, palpitations, and nausea without vomiting.  It is intermittent without any triggers and is without any alleviating or exacerbating factors.  He has not had any coughing, hemoptysis, nor any lower extremity pain or swelling.  He has never had similar issues in the past but does say that his dad had a heart attack at the age of 50 years.  Of note, he's had intermittent loss of consciousness for the past five days but denies that it's associated with his chest pain.  He denies any illicit drug use.    Pt describes ischemic CP, now resolved.  No prior cardiac hx, but dad had MI 50's.  He had recent suyicide attempt, but had meds adjusted and is doing well.

## 2018-07-18 NOTE — ASSESSMENT & PLAN NOTE
Trop 0.2, EKG without isch changes  Will plan cath today  Check echo  Load plavix (received 300mg this am, will give additional 300mg)  Pt received full dose enox at 0640 today (and 2300 yesterday)  Cont ASA/statin  Check lipids  Cath today    Risks, benefits and alternatives of the catheterization procedure were discussed with the patient which include but are not limited to: bleeding, infection, death, heart attack, arrhythmia, kidney failure, stroke, need for emergency surgery, etc.  Patient understands and and agrees to proceed.  Consent was placed on the chart.

## 2018-07-18 NOTE — PLAN OF CARE
TN to patient's room to discuss Helping the patient manage care at home.   TN/SW role explained to pt.     TN's name and contact info placed on white board.     Preferred pharmacy: [facility]      07/18/18 1010   Discharge Assessment   Assessment Type Discharge Planning Assessment   Confirmed/corrected address and phone number on facesheet? Yes   Assessment information obtained from? Patient   Expected Length of Stay (days) 2   Communicated expected length of stay with patient/caregiver yes   Prior to hospitilization cognitive status: Alert/Oriented   Prior to hospitalization functional status: Independent   Current cognitive status: Alert/Oriented   Current Functional Status: Independent   Lives With parent(s)   Able to Return to Prior Arrangements yes   Is patient able to care for self after discharge? Yes   Who are your caregiver(s) and their phone number(s)? father able to help at home, also girlfriend Sharon [phone]   Patient's perception of discharge disposition home or selfcare   Readmission Within The Last 30 Days no previous admission in last 30 days   Patient currently being followed by outpatient case management? No   Patient currently receives any other outside agency services? No   Equipment Currently Used at Home none   Do you have any problems affording any of your prescribed medications? No   Is the patient taking medications as prescribed? yes   Does the patient have transportation home? Yes   Transportation Available family or friend will provide   Does the patient receive services at the [facility]? No   Discharge Plan A Home   Patient/Family In Agreement With Plan yes

## 2018-07-18 NOTE — NURSING
Patient arrived to unit from cath lab procedure. No complaints, no acute distress noted. Plan of care reviewed with patient. Vitals and telemetry monitoring in progress. Site is clean, vas band in place, some swelling noted above vas band, will monitor site.

## 2018-07-18 NOTE — SUBJECTIVE & OBJECTIVE
Past Medical History:   Diagnosis Date    Bipolar 1 disorder 2011       History reviewed. No pertinent surgical history.    Review of patient's allergies indicates:  No Known Allergies    No current facility-administered medications on file prior to encounter.      No current outpatient prescriptions on file prior to encounter.     Family History     Problem Relation (Age of Onset)    Cancer Mother    Diabetes Father    Heart disease Father        Social History Main Topics    Smoking status: Never Smoker    Smokeless tobacco: Never Used    Alcohol use No    Drug use: No    Sexual activity: Yes     Partners: Female     Review of Systems   Constitution: Negative for chills, diaphoresis, fever, weakness and malaise/fatigue.   HENT: Negative for nosebleeds.    Eyes: Negative for blurred vision and double vision.   Cardiovascular: Positive for chest pain. Negative for claudication, cyanosis, dyspnea on exertion, leg swelling, orthopnea, palpitations, paroxysmal nocturnal dyspnea and syncope.   Respiratory: Negative for cough, shortness of breath and wheezing.    Skin: Negative for dry skin and poor wound healing.   Musculoskeletal: Negative for back pain, joint swelling and myalgias.   Gastrointestinal: Negative for abdominal pain, nausea and vomiting.   Genitourinary: Negative for hematuria.   Neurological: Negative for dizziness, headaches, numbness and seizures.   Psychiatric/Behavioral: Negative for altered mental status and depression.     Objective:     Vital Signs (Most Recent):  Temp: 98.4 °F (36.9 °C) (07/18/18 0304)  Pulse: 72 (07/18/18 0304)  Resp: 20 (07/18/18 0304)  BP: 113/66 (07/18/18 0304)  SpO2: 98 % (07/18/18 0304) Vital Signs (24h Range):  Temp:  [98.2 °F (36.8 °C)-98.7 °F (37.1 °C)] 98.4 °F (36.9 °C)  Pulse:  [72-90] 72  Resp:  [16-20] 20  SpO2:  [98 %-100 %] 98 %  BP: (113-136)/(66-90) 113/66     Weight: 114.4 kg (252 lb 3.3 oz)  Body mass index is 31.52 kg/m².    SpO2: 98 %  O2 Device (Oxygen  Therapy): room air      Intake/Output Summary (Last 24 hours) at 07/18/18 0649  Last data filed at 07/18/18 0049   Gross per 24 hour   Intake             1000 ml   Output                0 ml   Net             1000 ml       Lines/Drains/Airways     Peripheral Intravenous Line                 Peripheral IV - Single Lumen 07/17/18 2130 Right Hand less than 1 day                Physical Exam   Constitutional: He is oriented to person, place, and time. He appears well-developed and well-nourished.   HENT:   Head: Normocephalic and atraumatic.   Eyes: Conjunctivae and EOM are normal. Pupils are equal, round, and reactive to light. No scleral icterus.   Neck: Normal range of motion. Neck supple. No JVD present. Carotid bruit is not present. No tracheal deviation present. No thyromegaly present.   Cardiovascular: Normal rate, regular rhythm, S1 normal and S2 normal.  Exam reveals no gallop and no friction rub.    No murmur heard.  Pulmonary/Chest: Effort normal and breath sounds normal. No respiratory distress. He has no wheezes. He has no rales. He exhibits no tenderness.   Abdominal: Soft. He exhibits no distension.   Musculoskeletal: He exhibits no edema.   Neurological: He is alert and oriented to person, place, and time. He has normal strength. No cranial nerve deficit.   Skin: Skin is warm and dry. No rash noted.   Psychiatric: He has a normal mood and affect. His behavior is normal.       Current Medications:   aspirin  325 mg Oral Daily    atorvastatin  80 mg Oral Daily    carvedilol  6.25 mg Oral BID    [START ON 7/19/2018] clopidogrel  75 mg Oral Daily    divalproex  250 mg Oral TID    enoxparin  1 mg/kg Subcutaneous Q12H    QUEtiapine  100 mg Oral Daily       acetaminophen, morphine, nitroGLYCERIN, ondansetron, promethazine (PHENERGAN) IVPB, ramelteon, senna-docusate 8.6-50 mg    Laboratory:  CBC:    Recent Labs  Lab 07/08/18  1637 07/17/18  2140 07/18/18  0153   WHITE BLOOD CELL COUNT 15.22 H 14.22 H  10.75   HEMOGLOBIN 15.1 15.3 13.8 L   HEMATOCRIT 45.5 45.9 42.4   PLATELETS 375 H 350 324       CHEMISTRIES:    Recent Labs  Lab 07/08/18  1637 07/18/18  0153   GLUCOSE 167 H 89   SODIUM 141 144   POTASSIUM 3.8 4.0   BUN BLD 13 14   CREATININE 1.0 1.0   EGFR IF  >60.0 >60   EGFR IF NON- >60.0 >60   CALCIUM 10.0 9.2   MAGNESIUM  --  2.3       CARDIAC BIOMARKERS:    Recent Labs  Lab 07/17/18  2140 07/18/18  0153     --    TROPONIN I  --  0.266 H       COAGS:        LIPIDS/LFTS:    Recent Labs  Lab 07/08/18  1637 07/18/18  0153   AST 18 15   ALT 26 22       BNP:        TSH:    Recent Labs  Lab 07/08/18  1637   TSH 0.042 L       Free T4:    Recent Labs  Lab 07/08/18  1637   FREE T4 1.08       Diagnostic Results:  ECG (personally reviewed tracings):  7/17/18 2-54 SR 87, IRBBB    Chest X-Ray (personally reviewed image(s)): 7/17/18 NAD    Echo: ordered    Cath: pending

## 2018-07-18 NOTE — ASSESSMENT & PLAN NOTE
Patient has had a few days of syncope and today had some chest pain.  Initial POCT troponin at [facility] was 0.22 (RR <0.09) but a repeat here is 0.266.  He does report that his father had an MI in his 50's.  I have reviewed the EKG and it reveals NNN.  He is otherwise hemodynamically-stable.  D-dimer is negative.  He had been given an aspirin and full-dose enoxaparin.  Will obtain a lipid panel and HbA1c; obtain a 2D-echo in the morning; start beta-blocker and high-intensity statin; start clopidogrel; and consult Cardiology for further recommendations.

## 2018-07-18 NOTE — SUBJECTIVE & OBJECTIVE
Past Medical History:   Diagnosis Date    Bipolar 1 disorder 2011       History reviewed. No pertinent surgical history.    Review of patient's allergies indicates:  No Known Allergies    No current facility-administered medications on file prior to encounter.      No current outpatient prescriptions on file prior to encounter.     Family History     Problem Relation (Age of Onset)    Cancer Mother    Diabetes Father    Heart disease Father        Social History Main Topics    Smoking status: Never Smoker    Smokeless tobacco: Never Used    Alcohol use No    Drug use: No    Sexual activity: Yes     Partners: Female     Review of Systems   Constitutional: Positive for diaphoresis. Negative for activity change, appetite change, chills, fatigue, fever and unexpected weight change.   HENT: Negative.    Eyes: Negative.    Respiratory: Positive for chest tightness and shortness of breath. Negative for cough and wheezing.    Cardiovascular: Positive for palpitations. Negative for chest pain and leg swelling.   Gastrointestinal: Positive for nausea. Negative for abdominal distention, abdominal pain, blood in stool, constipation, diarrhea and vomiting.   Genitourinary: Negative for dysuria and hematuria.   Musculoskeletal: Negative.    Skin: Negative.    Neurological: Positive for syncope. Negative for dizziness, seizures, weakness and light-headedness.   Psychiatric/Behavioral: Negative.      Objective:     Vital Signs (Most Recent):  Temp: 98.4 °F (36.9 °C) (07/18/18 0304)  Pulse: 72 (07/18/18 0304)  Resp: 20 (07/18/18 0304)  BP: 113/66 (07/18/18 0304)  SpO2: 98 % (07/18/18 0304) Vital Signs (24h Range):  Temp:  [98.2 °F (36.8 °C)-98.7 °F (37.1 °C)] 98.4 °F (36.9 °C)  Pulse:  [72-90] 72  Resp:  [16-20] 20  SpO2:  [98 %-100 %] 98 %  BP: (113-136)/(66-90) 113/66     Weight: 114.4 kg (252 lb 3.3 oz)  Body mass index is 31.52 kg/m².    Physical Exam   Constitutional: He is oriented to person, place, and time. He appears  well-developed and well-nourished. No distress.   HENT:   Head: Normocephalic and atraumatic.   Right Ear: External ear normal.   Left Ear: External ear normal.   Nose: Nose normal.   Eyes: Right eye exhibits no discharge. Left eye exhibits no discharge.   Neck: Normal range of motion.   Cardiovascular:   Regular rate & rhythm with an S4 gallop but no murmurs   Pulmonary/Chest: Effort normal and breath sounds normal. No respiratory distress. He has no wheezes. He has no rales. He exhibits no tenderness.   Abdominal: Soft. Bowel sounds are normal. He exhibits no distension. There is no tenderness. There is no rebound and no guarding.   Musculoskeletal: Normal range of motion. He exhibits no edema.   Neurological: He is alert and oriented to person, place, and time.   Skin: Skin is warm and dry. He is not diaphoretic. No erythema.   Psychiatric: He has a normal mood and affect. His behavior is normal. Judgment and thought content normal.   Nursing note and vitals reviewed.          Significant Labs: All pertinent labs within the past 24 hours have been reviewed.    Significant Imaging: I have reviewed and interpreted all pertinent imaging results/findings within the past 24 hours.

## 2018-07-18 NOTE — NURSING
Patient received loading dose 300mg plavix and lovenox 120mg @ 0640, spoke with Dr Lozano, aware of administration, new order to give additional 300mg plavix and start normal saline. See orders.

## 2018-07-18 NOTE — PLAN OF CARE
Problem: Fall Risk (Adult)  Intervention: Reduce Risk/Promote Restraint Free Environment   18 1226   Safety Interventions   Environmental Safety Modification assistive device/personal items within reach;clutter free environment maintained;lighting adjusted;room organization consistent   Prevent  Drop/Fall   Safety/Security Measures family to remain at bedside     Intervention: Review Medications/Identify Contributors to Fall Risk   18 1226   Safety Interventions   Medication Review/Management medications reviewed     Intervention: Patient Rounds   18 1120   Safety Interventions   Patient Rounds bed in low position;bed wheels locked;call light in reach     Intervention: Safety Promotion/Fall Prevention   18 1226   Safety Interventions   Safety Promotion/Fall Prevention assistive device/personal item within reach;Fall Risk reviewed with patient/family;lighting adjusted;medications reviewed;nonskid shoes/socks when out of bed;instructed to call staff for mobility       Goal: Identify Related Risk Factors and Signs and Symptoms  Related risk factors and signs and symptoms are identified upon initiation of Human Response Clinical Practice Guideline (CPG)   Outcome: Ongoing (interventions implemented as appropriate)   18 1226   Fall Risk   Related Risk Factors (Fall Risk) fatigue/slow reaction;environment unfamiliar   Signs and Symptoms (Fall Risk) presence of risk factors     Goal: Absence of Falls  Patient will demonstrate the desired outcomes by discharge/transition of care.   Outcome: Ongoing (interventions implemented as appropriate)   18 1226   Fall Risk (Adult)   Absence of Falls making progress toward outcome       Problem: Cardiac: ACS (Acute Coronary Syndrome) (Adult)  Intervention: Manage Acute Chest Pain   18 0751   Chest Pain Assessment   Intervention cardiac enzymes drawn;cardiac monitoring continued;12-lead ECG obtained;thrombolytics     Intervention:  Optimize Myocardial Oxygenation/Perfusion   07/18/18 0751 07/18/18 1015   Activity   Activity Management --  ambulated to bathroom* - L4   Respiratory Interventions   Airway/Ventilation Management calming measures promoted --      Intervention: Prevent/Manage Thrombi/Emboli   07/18/18 0751 07/18/18 0842   Minimize Embolism Risk   VTE Prevention/Management bleeding precautions maintained --    Safety Interventions   Bleeding Precautions monitored for signs of bleeding --    OTHER   VTE Required Core Measure --  Pharmacological prophylaxis initiated/maintained     Intervention: Support Psychosocial Response to Life-changing Event/Hospitalization   07/18/18 0751   Coping/Psychosocial Interventions   Supportive Measures positive reinforcement provided;verbalization of feelings encouraged       Goal: Signs and Symptoms of Listed Potential Problems Will be Absent, Minimized or Managed (Cardiac: ACS)  Signs and symptoms of listed potential problems will be absent, minimized or managed by discharge/transition of care (reference Cardiac: ACS (Acute Coronary Syndrome) (Adult) CPG).   Outcome: Ongoing (interventions implemented as appropriate)   07/18/18 1226   Cardiac: ACS (Acute Coronary Syndrome)   Problems Assessed (Acute Coronary Syndrome) all

## 2018-07-18 NOTE — NURSING
Report received from JANINE Villalobos. Patient resting comfortably, no complaints, no acute distress noted. Plan of care reviewed with patient. Instructed patient to call for assistance before ambulating, side rails up x3, bed alarm refused, call light in reach, non skid socks in use. Patient verbalized understanding of instructions.

## 2018-07-18 NOTE — CONSULTS
[facility]  Cardiology  Consult Note    Patient Name: Vel Ordonez  MRN: [id number]  Admission Date: 7/17/2018  Hospital Length of Stay: 0 days  Code Status: Full Code   Attending Provider: Irene Manzo MD   Consulting Provider: Asher Stein MD  Primary Care Physician: Primary Doctor No  Principal Problem:NSTEMI (non-ST elevated myocardial infarction)    Patient information was obtained from patient and ER records.     Inpatient consult to Cardiology  Consult performed by: ASHER STEIN  Consult ordered by: SANGEETHA PEPPER  Reason for consult: NSTEMI        Subjective:     Chief Complaint:  CP     HPI:   32 y.o. male with Bipolar 1 disorder who presented initially to [facility] ED with complaints of chest pain for three days.  The pain is mid-sternal without radiation, is squeezing in quality, and is 5/10 in severity at its worst.  The pain is associated with dyspnea, diaphoresis, palpitations, and nausea without vomiting.  It is intermittent without any triggers and is without any alleviating or exacerbating factors.  He has not had any coughing, hemoptysis, nor any lower extremity pain or swelling.  He has never had similar issues in the past but does say that his dad had a heart attack at the age of 50 years.  Of note, he's had intermittent loss of consciousness for the past five days but denies that it's associated with his chest pain.  He denies any illicit drug use.    Pt describes ischemic CP, now resolved.  No prior cardiac hx, but dad had MI 50's.  He had recent suyicide attempt, but had meds adjusted and is doing well.    Past Medical History:   Diagnosis Date    Bipolar 1 disorder 2011       History reviewed. No pertinent surgical history.    Review of patient's allergies indicates:  No Known Allergies    No current facility-administered medications on file prior to encounter.      No current outpatient prescriptions on file prior to encounter.     Family History      Problem Relation (Age of Onset)    Cancer Mother    Diabetes Father    Heart disease Father        Social History Main Topics    Smoking status: Never Smoker    Smokeless tobacco: Never Used    Alcohol use No    Drug use: No    Sexual activity: Yes     Partners: Female     Review of Systems   Constitution: Negative for chills, diaphoresis, fever, weakness and malaise/fatigue.   HENT: Negative for nosebleeds.    Eyes: Negative for blurred vision and double vision.   Cardiovascular: Positive for chest pain. Negative for claudication, cyanosis, dyspnea on exertion, leg swelling, orthopnea, palpitations, paroxysmal nocturnal dyspnea and syncope.   Respiratory: Negative for cough, shortness of breath and wheezing.    Skin: Negative for dry skin and poor wound healing.   Musculoskeletal: Negative for back pain, joint swelling and myalgias.   Gastrointestinal: Negative for abdominal pain, nausea and vomiting.   Genitourinary: Negative for hematuria.   Neurological: Negative for dizziness, headaches, numbness and seizures.   Psychiatric/Behavioral: Negative for altered mental status and depression.     Objective:     Vital Signs (Most Recent):  Temp: 98.4 °F (36.9 °C) (07/18/18 0304)  Pulse: 72 (07/18/18 0304)  Resp: 20 (07/18/18 0304)  BP: 113/66 (07/18/18 0304)  SpO2: 98 % (07/18/18 0304) Vital Signs (24h Range):  Temp:  [98.2 °F (36.8 °C)-98.7 °F (37.1 °C)] 98.4 °F (36.9 °C)  Pulse:  [72-90] 72  Resp:  [16-20] 20  SpO2:  [98 %-100 %] 98 %  BP: (113-136)/(66-90) 113/66     Weight: 114.4 kg (252 lb 3.3 oz)  Body mass index is 31.52 kg/m².    SpO2: 98 %  O2 Device (Oxygen Therapy): room air      Intake/Output Summary (Last 24 hours) at 07/18/18 0649  Last data filed at 07/18/18 0049   Gross per 24 hour   Intake             1000 ml   Output                0 ml   Net             1000 ml       Lines/Drains/Airways     Peripheral Intravenous Line                 Peripheral IV - Single Lumen 07/17/18 2130 Right Hand less  than 1 day                Physical Exam   Constitutional: He is oriented to person, place, and time. He appears well-developed and well-nourished.   HENT:   Head: Normocephalic and atraumatic.   Eyes: Conjunctivae and EOM are normal. Pupils are equal, round, and reactive to light. No scleral icterus.   Neck: Normal range of motion. Neck supple. No JVD present. Carotid bruit is not present. No tracheal deviation present. No thyromegaly present.   Cardiovascular: Normal rate, regular rhythm, S1 normal and S2 normal.  Exam reveals no gallop and no friction rub.    No murmur heard.  Pulmonary/Chest: Effort normal and breath sounds normal. No respiratory distress. He has no wheezes. He has no rales. He exhibits no tenderness.   Abdominal: Soft. He exhibits no distension.   Musculoskeletal: He exhibits no edema.   Neurological: He is alert and oriented to person, place, and time. He has normal strength. No cranial nerve deficit.   Skin: Skin is warm and dry. No rash noted.   Psychiatric: He has a normal mood and affect. His behavior is normal.       Current Medications:   aspirin  325 mg Oral Daily    atorvastatin  80 mg Oral Daily    carvedilol  6.25 mg Oral BID    [START ON 7/19/2018] clopidogrel  75 mg Oral Daily    divalproex  250 mg Oral TID    enoxparin  1 mg/kg Subcutaneous Q12H    QUEtiapine  100 mg Oral Daily       acetaminophen, morphine, nitroGLYCERIN, ondansetron, promethazine (PHENERGAN) IVPB, ramelteon, senna-docusate 8.6-50 mg    Laboratory:  CBC:    Recent Labs  Lab 07/08/18  1637 07/17/18  2140 07/18/18  0153   WHITE BLOOD CELL COUNT 15.22 H 14.22 H 10.75   HEMOGLOBIN 15.1 15.3 13.8 L   HEMATOCRIT 45.5 45.9 42.4   PLATELETS 375 H 350 324       CHEMISTRIES:    Recent Labs  Lab 07/08/18  1637 07/18/18  0153   GLUCOSE 167 H 89   SODIUM 141 144   POTASSIUM 3.8 4.0   BUN BLD 13 14   CREATININE 1.0 1.0   EGFR IF  >60.0 >60   EGFR IF NON- >60.0 >60   CALCIUM 10.0 9.2    MAGNESIUM  --  2.3       CARDIAC BIOMARKERS:    Recent Labs  Lab 07/17/18  2140 07/18/18  0153     --    TROPONIN I  --  0.266 H       COAGS:        LIPIDS/LFTS:    Recent Labs  Lab 07/08/18  1637 07/18/18  0153   AST 18 15   ALT 26 22       BNP:        TSH:    Recent Labs  Lab 07/08/18  1637   TSH 0.042 L       Free T4:    Recent Labs  Lab 07/08/18  1637   FREE T4 1.08       Diagnostic Results:  ECG (personally reviewed tracings):  7/17/18 2-54 SR 87, IRBBB    Chest X-Ray (personally reviewed image(s)): 7/17/18 NAD    Echo: ordered    Cath: pending      Assessment and Plan:     * NSTEMI (non-ST elevated myocardial infarction)    Trop 0.2, EKG without isch changes  Will plan cath today  Check echo  Load plavix (received 300mg this am, will give additional 300mg)  Pt received full dose enox at 0640 today (and 2300 yesterday)  Cont ASA/statin  Check lipids  Cath today    Risks, benefits and alternatives of the catheterization procedure were discussed with the patient which include but are not limited to: bleeding, infection, death, heart attack, arrhythmia, kidney failure, stroke, need for emergency surgery, etc.  Patient understands and and agrees to proceed.  Consent was placed on the chart.              Bipolar 1 disorder    Per IM            VTE Risk Mitigation         Ordered     IP VTE HIGH RISK PATIENT  Once      07/18/18 1428          Thank you for your consult. I will follow-up with patient. Please contact us if you have any additional questions.    Asher Lozano MD  Cardiology   [facility]

## 2018-07-18 NOTE — HOSPITAL COURSE
Mr. Ordonez was placed in observation for ACS rule out after presenting with chest pain.  EKG without evidence of acute ischemia, troponin elevated x3 with a peak of 0.266.  He was seen and evaluated by Cardiology and underwent left heart catheterization with showed normal coronaries and LV function.  Recommendation for ASA, beta blocker, and statin with outpatient follow up.  Prescriptions provided.  His pain resolved and did not recur.  Discharged home in stable condition to follow up with his PCP and Cardiology.

## 2018-07-18 NOTE — HPI
Mr. Vel Ordonez is a 32 y.o. male with Bipolar 1 disorder who presented initially to [facility] ED with complaints of chest pain for three days.  The pain is mid-sternal without radiation, is squeezing in quality, and is 5/10 in severity at its worst.  The pain is associated with dyspnea, diaphoresis, palpitations, and nausea without vomiting.  It is intermittent without any triggers and is without any alleviating or exacerbating factors.  He has not had any coughing, hemoptysis, nor any lower extremity pain or swelling.  He has never had similar issues in the past but does say that his dad had a heart attack at the age of 50 years.  Of note, he's had intermittent loss of consciousness for the past five days but denies that it's associated with his chest pain.  He denies any illicit drug use.

## 2018-07-18 NOTE — H&P
[facility] Hospital Medicine  History & Physical    Patient Name: Vel Ordonez  MRN: [id number]  Admission Date: 7/17/2018  Attending Physician: Irene Manzo MD   Primary Care Provider: Primary Doctor No         Patient information was obtained from patient.     Subjective:     Principal Problem:NSTEMI (non-ST elevated myocardial infarction)    Chief Complaint: Chest pain today.    HPI: Mr. Vel Ordonez is a 32 y.o. male with Bipolar 1 disorder who presented initially to [facility] ED with complaints of chest pain for three days.  The pain is mid-sternal without radiation, is squeezing in quality, and is 5/10 in severity at its worst.  The pain is associated with dyspnea, diaphoresis, palpitations, and nausea without vomiting.  It is intermittent without any triggers and is without any alleviating or exacerbating factors.  He has not had any coughing, hemoptysis, nor any lower extremity pain or swelling.  He has never had similar issues in the past but does say that his dad had a heart attack at the age of 50 years.  Of note, he's had intermittent loss of consciousness for the past five days but denies that it's associated with his chest pain.  He denies any illicit drug use.    Chart Review:  Patient has not had any recent hospitalizations or outpatient clinic visits within the system.    Past Medical History:   Diagnosis Date    Bipolar 1 disorder 2011       History reviewed. No pertinent surgical history.    Review of patient's allergies indicates:  No Known Allergies    No current facility-administered medications on file prior to encounter.      No current outpatient prescriptions on file prior to encounter.     Family History     Problem Relation (Age of Onset)    Cancer Mother    Diabetes Father    Heart disease Father        Social History Main Topics    Smoking status: Never Smoker    Smokeless tobacco: Never Used    Alcohol use No    Drug use: No    Sexual  activity: Yes     Partners: Female     Review of Systems   Constitutional: Positive for diaphoresis. Negative for activity change, appetite change, chills, fatigue, fever and unexpected weight change.   HENT: Negative.    Eyes: Negative.    Respiratory: Positive for chest tightness and shortness of breath. Negative for cough and wheezing.    Cardiovascular: Positive for palpitations. Negative for chest pain and leg swelling.   Gastrointestinal: Positive for nausea. Negative for abdominal distention, abdominal pain, blood in stool, constipation, diarrhea and vomiting.   Genitourinary: Negative for dysuria and hematuria.   Musculoskeletal: Negative.    Skin: Negative.    Neurological: Positive for syncope. Negative for dizziness, seizures, weakness and light-headedness.   Psychiatric/Behavioral: Negative.      Objective:     Vital Signs (Most Recent):  Temp: 98.4 °F (36.9 °C) (07/18/18 0304)  Pulse: 72 (07/18/18 0304)  Resp: 20 (07/18/18 0304)  BP: 113/66 (07/18/18 0304)  SpO2: 98 % (07/18/18 0304) Vital Signs (24h Range):  Temp:  [98.2 °F (36.8 °C)-98.7 °F (37.1 °C)] 98.4 °F (36.9 °C)  Pulse:  [72-90] 72  Resp:  [16-20] 20  SpO2:  [98 %-100 %] 98 %  BP: (113-136)/(66-90) 113/66     Weight: 114.4 kg (252 lb 3.3 oz)  Body mass index is 31.52 kg/m².    Physical Exam   Constitutional: He is oriented to person, place, and time. He appears well-developed and well-nourished. No distress.   HENT:   Head: Normocephalic and atraumatic.   Right Ear: External ear normal.   Left Ear: External ear normal.   Nose: Nose normal.   Eyes: Right eye exhibits no discharge. Left eye exhibits no discharge.   Neck: Normal range of motion.   Cardiovascular:   Regular rate & rhythm with an S4 gallop but no murmurs   Pulmonary/Chest: Effort normal and breath sounds normal. No respiratory distress. He has no wheezes. He has no rales. He exhibits no tenderness.   Abdominal: Soft. Bowel sounds are normal. He exhibits no distension. There is no  tenderness. There is no rebound and no guarding.   Musculoskeletal: Normal range of motion. He exhibits no edema.   Neurological: He is alert and oriented to person, place, and time.   Skin: Skin is warm and dry. He is not diaphoretic. No erythema.   Psychiatric: He has a normal mood and affect. His behavior is normal. Judgment and thought content normal.   Nursing note and vitals reviewed.          Significant Labs: All pertinent labs within the past 24 hours have been reviewed.    Significant Imaging: I have reviewed and interpreted all pertinent imaging results/findings within the past 24 hours.    Assessment/Plan:     * NSTEMI (non-ST elevated myocardial infarction)    Patient has had a few days of syncope and today had some chest pain.  Initial POCT troponin at [facility] was 0.22 (RR <0.09) but a repeat here is 0.266.  He does report that his father had an MI in his 50's.  I have reviewed the EKG and it reveals NNN.  He is otherwise hemodynamically-stable.  D-dimer is negative.  He had been given an aspirin and full-dose enoxaparin.  Will obtain a lipid panel and HbA1c; obtain a 2D-echo in the morning; start beta-blocker and high-intensity statin; start clopidogrel; and consult Cardiology for further recommendations.        Bipolar 1 disorder    Stable; will continue his home regimen of divalproex and quetiapine.          VTE Risk Mitigation         Ordered     enoxaparin injection 120 mg  Every 12 hours (non-standard times)      07/18/18 0455     IP VTE HIGH RISK PATIENT  Once      07/18/18 0455           Margaret Esposito M.D.  Staff Nocturnist  Department of Hospital Medicine  [facility]  Pager: [phone]

## 2018-07-20 ENCOUNTER — NURSE TRIAGE (OUTPATIENT)
Dept: ADMINISTRATIVE | Facility: CLINIC | Age: 33
End: 2018-07-20

## 2018-07-20 ENCOUNTER — HOSPITAL ENCOUNTER (OUTPATIENT)
Facility: HOSPITAL | Age: 33
Discharge: HOME | End: 2018-07-21
Attending: EMERGENCY MEDICINE | Admitting: EMERGENCY MEDICINE
Payer: MEDICAID

## 2018-07-20 DIAGNOSIS — R56.9 SEIZURE: ICD-10-CM

## 2018-07-20 DIAGNOSIS — F31.9 BIPOLAR 1 DISORDER: Chronic | ICD-10-CM

## 2018-07-20 DIAGNOSIS — R79.89 ELEVATED TROPONIN: ICD-10-CM

## 2018-07-20 DIAGNOSIS — R55 SYNCOPE, UNSPECIFIED SYNCOPE TYPE: ICD-10-CM

## 2018-07-20 DIAGNOSIS — R07.9 CHEST PAIN: Primary | ICD-10-CM

## 2018-07-20 LAB
APTT BLDCRRT: 31.3 SEC
BASOPHILS # BLD AUTO: 0.03 K/UL
BASOPHILS NFR BLD: 0.3 %
D DIMER PPP IA.FEU-MCNC: <0.19 MG/L FEU
DIFFERENTIAL METHOD: ABNORMAL
EOSINOPHIL # BLD AUTO: 0.2 K/UL
EOSINOPHIL NFR BLD: 1.7 %
ERYTHROCYTE [DISTWIDTH] IN BLOOD BY AUTOMATED COUNT: 13.6 %
ETHANOL SERPL-MCNC: <10 MG/DL
HCT VFR BLD AUTO: 44 %
HGB BLD-MCNC: 14.5 G/DL
INR PPP: 1.1
LYMPHOCYTES # BLD AUTO: 2.5 K/UL
LYMPHOCYTES NFR BLD: 24.4 %
MCH RBC QN AUTO: 30 PG
MCHC RBC AUTO-ENTMCNC: 33 G/DL
MCV RBC AUTO: 91 FL
MONOCYTES # BLD AUTO: 1.2 K/UL
MONOCYTES NFR BLD: 11.5 %
NEUTROPHILS # BLD AUTO: 6.3 K/UL
NEUTROPHILS NFR BLD: 62.1 %
PLATELET # BLD AUTO: 301 K/UL
PMV BLD AUTO: 10.3 FL
PROTHROMBIN TIME: 11.3 SEC
RBC # BLD AUTO: 4.84 M/UL
WBC # BLD AUTO: 10.16 K/UL

## 2018-07-20 PROCEDURE — 84484 ASSAY OF TROPONIN QUANT: CPT

## 2018-07-20 PROCEDURE — 93005 ELECTROCARDIOGRAM TRACING: CPT

## 2018-07-20 PROCEDURE — 82550 ASSAY OF CK (CPK): CPT

## 2018-07-20 PROCEDURE — 85025 COMPLETE CBC W/AUTO DIFF WBC: CPT

## 2018-07-20 PROCEDURE — 85730 THROMBOPLASTIN TIME PARTIAL: CPT

## 2018-07-20 PROCEDURE — 80053 COMPREHEN METABOLIC PANEL: CPT

## 2018-07-20 PROCEDURE — 80320 DRUG SCREEN QUANTALCOHOLS: CPT

## 2018-07-20 PROCEDURE — 99285 EMERGENCY DEPT VISIT HI MDM: CPT | Mod: 25

## 2018-07-20 PROCEDURE — 85379 FIBRIN DEGRADATION QUANT: CPT

## 2018-07-20 PROCEDURE — 85610 PROTHROMBIN TIME: CPT

## 2018-07-20 PROCEDURE — 83690 ASSAY OF LIPASE: CPT

## 2018-07-20 PROCEDURE — 93010 ELECTROCARDIOGRAM REPORT: CPT | Mod: ,,, | Performed by: INTERNAL MEDICINE

## 2018-07-20 PROCEDURE — 80307 DRUG TEST PRSMV CHEM ANLYZR: CPT

## 2018-07-20 RX ADMIN — LIDOCAINE HYDROCHLORIDE: 20 SOLUTION ORAL; TOPICAL at 11:07

## 2018-07-21 VITALS
TEMPERATURE: 98 F | DIASTOLIC BLOOD PRESSURE: 90 MMHG | WEIGHT: 248 LBS | HEIGHT: 75 IN | SYSTOLIC BLOOD PRESSURE: 128 MMHG | BODY MASS INDEX: 30.84 KG/M2 | RESPIRATION RATE: 18 BRPM | OXYGEN SATURATION: 98 % | HEART RATE: 69 BPM

## 2018-07-21 PROBLEM — R07.9 CHEST PAIN: Status: ACTIVE | Noted: 2018-07-21

## 2018-07-21 PROBLEM — R55 SYNCOPE: Status: ACTIVE | Noted: 2018-07-21

## 2018-07-21 PROBLEM — R56.9 SEIZURE: Status: ACTIVE | Noted: 2018-07-21

## 2018-07-21 LAB
ALBUMIN SERPL BCP-MCNC: 4.1 G/DL
ALP SERPL-CCNC: 72 U/L
ALT SERPL W/O P-5'-P-CCNC: 37 U/L
AMPHET+METHAMPHET UR QL: NEGATIVE
ANION GAP SERPL CALC-SCNC: 9 MMOL/L
AST SERPL-CCNC: 33 U/L
BARBITURATES UR QL SCN>200 NG/ML: NEGATIVE
BENZODIAZ UR QL SCN>200 NG/ML: NEGATIVE
BILIRUB SERPL-MCNC: 0.3 MG/DL
BUN SERPL-MCNC: 16 MG/DL
BZE UR QL SCN: NEGATIVE
CALCIUM SERPL-MCNC: 9.3 MG/DL
CANNABINOIDS UR QL SCN: NEGATIVE
CHLORIDE SERPL-SCNC: 103 MMOL/L
CK SERPL-CCNC: 98 U/L
CO2 SERPL-SCNC: 26 MMOL/L
CREAT SERPL-MCNC: 0.9 MG/DL
CREAT UR-MCNC: 141.6 MG/DL
EST. GFR  (AFRICAN AMERICAN): >60 ML/MIN/1.73 M^2
EST. GFR  (NON AFRICAN AMERICAN): >60 ML/MIN/1.73 M^2
GLUCOSE SERPL-MCNC: 90 MG/DL
LIPASE SERPL-CCNC: 25 U/L
METHADONE UR QL SCN>300 NG/ML: NEGATIVE
OPIATES UR QL SCN: NEGATIVE
PCP UR QL SCN>25 NG/ML: NEGATIVE
POTASSIUM SERPL-SCNC: 4.2 MMOL/L
PROT SERPL-MCNC: 7.5 G/DL
SODIUM SERPL-SCNC: 138 MMOL/L
TOXICOLOGY INFORMATION: NORMAL
TROPONIN I SERPL DL<=0.01 NG/ML-MCNC: 0.03 NG/ML
TROPONIN I SERPL DL<=0.01 NG/ML-MCNC: 0.03 NG/ML

## 2018-07-21 PROCEDURE — G0378 HOSPITAL OBSERVATION PER HR: HCPCS

## 2018-07-21 PROCEDURE — 90670 PCV13 VACCINE IM: CPT | Performed by: HOSPITALIST

## 2018-07-21 PROCEDURE — 90471 IMMUNIZATION ADMIN: CPT | Performed by: HOSPITALIST

## 2018-07-21 PROCEDURE — 25000003 PHARM REV CODE 250: Performed by: EMERGENCY MEDICINE

## 2018-07-21 PROCEDURE — 94761 N-INVAS EAR/PLS OXIMETRY MLT: CPT

## 2018-07-21 PROCEDURE — 84484 ASSAY OF TROPONIN QUANT: CPT

## 2018-07-21 PROCEDURE — 63600175 PHARM REV CODE 636 W HCPCS: Performed by: HOSPITALIST

## 2018-07-21 PROCEDURE — 99204 OFFICE O/P NEW MOD 45 MIN: CPT | Mod: ,,, | Performed by: PSYCHIATRY & NEUROLOGY

## 2018-07-21 PROCEDURE — 36415 COLL VENOUS BLD VENIPUNCTURE: CPT

## 2018-07-21 RX ORDER — PANTOPRAZOLE SODIUM 40 MG/1
40 TABLET, DELAYED RELEASE ORAL DAILY
Qty: 30 TABLET | Refills: 0 | Status: SHIPPED | OUTPATIENT
Start: 2018-07-21 | End: 2018-12-17

## 2018-07-21 RX ORDER — DIVALPROEX SODIUM 250 MG/1
250 TABLET, DELAYED RELEASE ORAL 3 TIMES DAILY
Status: DISCONTINUED | OUTPATIENT
Start: 2018-07-21 | End: 2018-07-21 | Stop reason: HOSPADM

## 2018-07-21 RX ORDER — ASPIRIN 81 MG/1
81 TABLET ORAL DAILY
Status: DISCONTINUED | OUTPATIENT
Start: 2018-07-21 | End: 2018-07-21 | Stop reason: HOSPADM

## 2018-07-21 RX ORDER — CARVEDILOL 3.12 MG/1
3.12 TABLET ORAL 2 TIMES DAILY
Status: DISCONTINUED | OUTPATIENT
Start: 2018-07-21 | End: 2018-07-21 | Stop reason: HOSPADM

## 2018-07-21 RX ORDER — ATORVASTATIN CALCIUM 40 MG/1
80 TABLET, FILM COATED ORAL DAILY
Status: DISCONTINUED | OUTPATIENT
Start: 2018-07-21 | End: 2018-07-21 | Stop reason: HOSPADM

## 2018-07-21 RX ADMIN — DIVALPROEX SODIUM 250 MG: 250 TABLET, DELAYED RELEASE ORAL at 09:07

## 2018-07-21 RX ADMIN — DIVALPROEX SODIUM 250 MG: 250 TABLET, DELAYED RELEASE ORAL at 02:07

## 2018-07-21 RX ADMIN — PNEUMOCOCCAL 13-VALENT CONJUGATE VACCINE 0.5 ML: 2.2; 2.2; 2.2; 2.2; 2.2; 4.4; 2.2; 2.2; 2.2; 2.2; 2.2; 2.2; 2.2 INJECTION, SUSPENSION INTRAMUSCULAR at 03:07

## 2018-07-21 RX ADMIN — ATORVASTATIN CALCIUM 80 MG: 40 TABLET, FILM COATED ORAL at 09:07

## 2018-07-21 RX ADMIN — ASPIRIN 81 MG: 81 TABLET, COATED ORAL at 09:07

## 2018-07-21 NOTE — CONSULTS
"[facility]  Neurology  Consult Note    Patient Name: Vel Ordonez  MRN: [id number]  Admission Date: 7/20/2018  Hospital Length of Stay: 0 days  Code Status: Full Code   Attending Provider: Irene Manzo MD   Consulting Provider: Hany eDl Castillo MD  Primary Care Physician: Primary Doctor No  Principal Problem:<principal problem not specified>    Inpatient consult to Neurology  Consult performed by: HANY DEL CASTILLO  Consult ordered by: BRYAN LEE        Subjective:     Chief Complaint: "Passing out"     HPI: 33 y/o male with medical Hx as listed below comes to ED for chest pain. His wife explains that pt has been presenting episodes of staring for around 2-3 weeks. These seem to be preceded by fidgeting with hands. He is unresponsive during event for 5-10 minutes. After seems confused. Perhaps they occur two times weekly Pt has no recollection of this. No Hx of seizures; no reported convulsions. He was Dx with Bipolar Disorder and put on  mg/250 mg/500 mg two weeks ago.    Past Medical History:   Diagnosis Date    Bipolar 1 disorder 2011    Hypertension        History reviewed. No pertinent surgical history.    Review of patient's allergies indicates:  No Known Allergies    Current Neurological Medications:     No current facility-administered medications on file prior to encounter.      Current Outpatient Prescriptions on File Prior to Encounter   Medication Sig    aspirin (ECOTRIN) 81 MG EC tablet Take 1 tablet (81 mg total) by mouth once daily.    atorvastatin (LIPITOR) 80 MG tablet Take 1 tablet (80 mg total) by mouth once daily.    carvedilol (COREG) 6.25 MG tablet Take 0.5 tablets (3.125 mg total) by mouth 2 (two) times daily.    divalproex (DEPAKOTE) 250 MG EC tablet Take 250 mg by mouth 3 (three) times daily. 250mg daily  250mg afternoon and 500mg nightly    QUEtiapine (SEROQUEL) 100 MG Tab Take by mouth every evening.      Family History     Problem Relation " (Age of Onset)    Cancer Mother    Diabetes Father    Heart disease Father        Social History Main Topics    Smoking status: Never Smoker    Smokeless tobacco: Never Used    Alcohol use No    Drug use: No    Sexual activity: Yes     Partners: Female     Review of Systems   Constitutional: Negative for fever.   HENT: Negative for trouble swallowing.    Eyes: Negative for photophobia.   Respiratory: Negative for shortness of breath.    Cardiovascular: Negative for palpitations.   Gastrointestinal: Negative for abdominal pain.   Genitourinary: Negative for dysuria.   Musculoskeletal: Negative for back pain.   Neurological: Negative for headaches.   Psychiatric/Behavioral: Negative for hallucinations.     Objective:     Vital Signs (Most Recent):  Temp: 97.5 °F (36.4 °C) (07/21/18 0747)  Pulse: (!) 52 (07/21/18 0747)  Resp: 18 (07/21/18 0747)  BP: (!) 96/54 (07/21/18 0747)  SpO2: 98 % (07/21/18 0809) Vital Signs (24h Range):  Temp:  [97.5 °F (36.4 °C)-98.3 °F (36.8 °C)] 97.5 °F (36.4 °C)  Pulse:  [52-68] 52  Resp:  [18] 18  SpO2:  [96 %-99 %] 98 %  BP: ()/(54-92) 96/54     Weight: 112.5 kg (248 lb 0.3 oz)  Body mass index is 31 kg/m².    Physical Exam   Constitutional: He is oriented to person, place, and time. No distress.   HENT:   Head: Normocephalic.   Eyes: Right eye exhibits no discharge. Left eye exhibits no discharge.   Neck: Normal range of motion.   Cardiovascular: Normal rate.    Pulmonary/Chest: Breath sounds normal.   Abdominal: Bowel sounds are normal.   Musculoskeletal: He exhibits no edema.   Neurological: He is oriented to person, place, and time. He has normal strength. He has a normal Finger-Nose-Finger Test.   Reflex Scores:       Tricep reflexes are 2+ on the right side and 2+ on the left side.       Bicep reflexes are 2+ on the right side and 2+ on the left side.       Brachioradialis reflexes are 2+ on the right side and 2+ on the left side.       Patellar reflexes are 2+ on the  right side and 2+ on the left side.  Skin: He is not diaphoretic.   Psychiatric: His speech is normal.       NEUROLOGICAL EXAMINATION:     MENTAL STATUS   Oriented to person, place, and time.   Speech: speech is normal   Level of consciousness: alert    CRANIAL NERVES     CN III, IV, VI   Right pupil: Size: 2 mm. Shape: regular. Reactivity: brisk. Consensual response: intact. Accommodation: intact.   Left pupil: Size: 2 mm. Shape: regular. Reactivity: brisk. Consensual response: intact. Accommodation: intact.   Nystagmus: none   Ophthalmoparesis: none  Conjugate gaze: present    CN V   Right facial sensation deficit: none  Left facial sensation deficit: none    CN VII   Right facial weakness: none  Left facial weakness: none    CN IX, X   Palate: symmetric    CN XI   Right trapezius strength: normal  Left trapezius strength: normal    CN XII   Tongue deviation: none    MOTOR EXAM     Strength   Strength 5/5 throughout.     REFLEXES     Reflexes   Right brachioradialis: 2+  Left brachioradialis: 2+  Right biceps: 2+  Left biceps: 2+  Right triceps: 2+  Left triceps: 2+  Right patellar: 2+  Left patellar: 2+    SENSORY EXAM   Right arm light touch: normal  Left arm light touch: normal  Right leg light touch: normal  Left leg light touch: normal    GAIT AND COORDINATION      Coordination   Finger to nose coordination: normal    Tremor   Resting tremor: absent      Significant Labs:   CBC:   Recent Labs  Lab 07/20/18  2322   WBC 10.16   HGB 14.5   HCT 44.0        CMP:   Recent Labs  Lab 07/20/18  2322   GLU 90      K 4.2      CO2 26   BUN 16   CREATININE 0.9   CALCIUM 9.3   PROT 7.5   ALBUMIN 4.1   BILITOT 0.3   ALKPHOS 72   AST 33   ALT 37   ANIONGAP 9   EGFRNONAA >60     Head CT  No CT evidence of acute intracranial abnormality. Clinical correlation and further evaluation as warranted.      Electronically signed by: Wilda Spaulding MD  Date: 07/17/2018  Time: 22:03    Assessment and Plan:     33 y/o  male consulted for possible seizures vs syncope    1. Seizures? Per wife's description seem to be stereotypical. Could be seizures but uncertain at this point what these episodes are. Pt began to present these after starting VPA.    He is already on valproic acid for BD which also double as antiepileptic drug. Last level three days ago was therapeutic. He has room to work should he need increase in dosing.   -Needs EEG, outpatient. Hopefully an episode can be caught while recording.   -No driving for now until this can be further explored. Pt and wife agree to this.   -OK to D/C from neurology standpoint.    Active Diagnoses:    Diagnosis Date Noted POA    Chest pain [R07.9] 07/21/2018 Yes      Problems Resolved During this Admission:    Diagnosis Date Noted Date Resolved POA       VTE Risk Mitigation         Ordered     IP VTE HIGH RISK PATIENT  Once      07/21/18 0238     Place SAMMIE hose  Until discontinued      07/21/18 0238     Place sequential compression device  Until discontinued      07/21/18 0238          Thank you for your consult. I will follow-up with patient. Please contact us if you have any additional questions.    Hany Fields MD  Neurology  [facility]

## 2018-07-21 NOTE — TELEPHONE ENCOUNTER
"  Reason for Disposition   Chest pain   [1] Intermittent  chest pain or "angina" AND [2] increasing in severity or frequency  (Exception: pains lasting a few seconds)    Answer Assessment - Initial Assessment Questions  1. SYMPTOM: "What's the main symptom you're concerned about?" (e.g., pain, fever, vomiting)      Right arm swollen from elbow down to hand  2. ONSET: "When did ________  start?"      Today  3. SURGERY: "What surgery was performed?"      Angiogram  4. DATE of SURGERY: "When was surgery performed?"       Wednesday  5. ANESTHESIA: " What type of anesthesia did you have?" (e.g., general, spinal, epidural, local)      unknown  6. PAIN: "Is there any pain?" If so, ask: "How bad is it?"  (Scale 1-10; or mild, moderate, severe)      Yes. 7/10  7. FEVER: "Do you have a fever?" If so, ask: "What is your temperature, how was it measured, and when did it start?"      No  8. VOMITING: "Is there any vomiting?" If yes, ask: "How many times?"      No  9. BLEEDING: "Is there any bleeding?" If so, ask: "How much?" and "Where?"      No  10. OTHER SYMPTOMS: "Do you have any other symptoms?" (e.g., drainage from wound, painful urination, constipation)        Chest pain off and on. Last time he had chest pain was 30 min ago and lasted about 1 min    Protocols used: ST POST-OP SYMPTOMS AND [id number], ST CHEST PAIN-A-    "

## 2018-07-21 NOTE — ASSESSMENT & PLAN NOTE
Uncertain if these are definitively seizures, he has been seen and evaluated by Neurology with recommendation to continue depakote and outpatient Neurology follow up with EEG, no driving for now.

## 2018-07-21 NOTE — PLAN OF CARE
07/21/18 1403   Discharge Assessment   Assessment Type Discharge Planning Assessment   Confirmed/corrected address and phone number on facesheet? Yes   Assessment information obtained from? Patient   Communicated expected length of stay with patient/caregiver yes   Prior to hospitilization cognitive status: Alert/Oriented   Prior to hospitalization functional status: Independent   Current cognitive status: Alert/Oriented   Current Functional Status: Independent   Facility Arrived From: Home   Lives With parent(s)   Able to Return to Prior Arrangements yes   Is patient able to care for self after discharge? Yes   Who are your caregiver(s) and their phone number(s)? Significant other-Sharon: [phone]   Patient's perception of discharge disposition home or selfcare   Readmission Within The Last 30 Days planned readmission   If yes, most recent facility name: [facility]; same symptoms   Patient currently being followed by outpatient case management? No   Patient currently receives any other outside agency services? No   Equipment Currently Used at Home none   Do you have any problems affording any of your prescribed medications? No   Is the patient taking medications as prescribed? yes   Does the patient have transportation home? Yes   Transportation Available family or friend will provide   Does the patient receive services at the [facility]? No   Discharge Plan A Home with family   Discharge Plan B Other  (TBD)   Patient/Family In Agreement With Plan yes   Readmission Questionnaire   At the time of your discharge, did someone talk to you about what your health problems were? Yes   At the time of discharge, did someone talk to you about what to watch out for regarding worsening of your health problem? Yes   At the time of discharge, did someone talk to you about what to do if you experienced worsening of your health problem? Yes   At the time of discharge, did someone talk to you about which medication to take when you  left the hospital and which ones to stop taking? Yes   At the time of discharge, did someone talk to you about when and where to follow up with a doctor after you left the hospital? Yes   What do you believe caused you to be sick enough to be re-admitted? Still same symptoms   How often do you need to have someone help you when you read instructions, pamphlets, or other written material from your doctor or pharmacy? Rarely   Do you have problems taking your medications as prescribed? No   Do you have any problems affording any of  your prescribed medications? No   Do you have problems obtaining/receiving your medications? No   Does the patient have transportation to healthcare appointments? Yes   Living Arrangements house   Does the patient have family/friends to help with healtcare needs after discharge? yes   Does your caregiver provide all the help you need? Yes   If no, what kind of help do you need at home? Transport-unable to drive right now     Patient is independent at home with family; significant other is involved and stays with him some; no assist usually needed; no current services; no DME; no needs anticipated; explored options for neurology for his insurance.    PCP: [facility] in [place], [place]    Extended Emergency Contact Information  Primary Emergency Contact: Sharon Dockery  Address: [facility] [place] [address] [place]  Mobile Phone: [phone]  Relation: Significant other     [id number] - [place] - [address]  [address]  [facility] [address]  Phone: [phone] Fax: [phone]    Payor: MEDICAID / Plan: HEALTHY BLUE ([facility]) / Product Type: Managed Medicaid /      1)  Warning signs/symptoms information reviewed with and provided to patient in blue folder  2)  Discharge planning begins upon admission   3)  Discussed and reinforced patient responsibility for managing health care at home including:        a) Acquiring prescribed  medications; b) following-through with scheduled follow-up appointments or scheduling those that could not be set; and c) monitoring health at home.

## 2018-07-21 NOTE — PROGRESS NOTES
Report received from off going nurse, JANINE Samuel. Patient AAO. No signs of distress noted. Call light in reach. Bed low and locked. Will continue to monitor.

## 2018-07-21 NOTE — HOSPITAL COURSE
Mr. Ordonez was placed in observation for further evaluation and treatment after presenting with possible syncope and chest pain.  He was seen and evaluated by Cardiology and due to clean cath 3 days ago, no further intervention or treatment was recommended.  Prescription for PPI trial provided.  He was also seen and evaluated by Neurology with recommendation for discharge with outpatient Neurology follow up for EEG and no driving.  Referral placed to [facility].  Discussed with the patient and his wife.  Discharged home in stable condition.

## 2018-07-21 NOTE — PLAN OF CARE
Problem: Fall Risk (Adult)  Intervention: Monitor/Assist with Self Care   18   Activity   Activity Assistance Provided independent   Daily Care Interventions   Self-Care Promotion BADL personal objects within reach   Functional Level Current   Ambulation 0 - independent   Transferring 0 - independent   Toileting 0 - independent   Bathing 0 - independent   Dressing 0 - independent   Eating 0 - independent   Communication 0 - understands/communicates without difficulty   Swallowing 0 - swallows foods/liquids without difficulty     Intervention: Reduce Risk/Promote Restraint Free Environment   18   Safety Interventions   Environmental Safety Modification assistive device/personal items within reach   Prevent [place] Drop/Fall   Safety/Security Measures bed alarm set;bed alarm refused;chair alarm set;family to remain at bedside   Safety Interventions   Safety Precautions emergency equipment at bedside     Intervention: Review Medications/Identify Contributors to Fall Risk   18   Safety Interventions   Medication Review/Management medications reviewed     Intervention: Patient Rounds   18   Safety Interventions   Patient Rounds bed in low position;bed wheels locked;call light in reach     Intervention: Safety Promotion/Fall Prevention   18   Safety Interventions   Safety Promotion/Fall Prevention assistive device/personal item within reach;side rails raised x 2     Intervention: Safety Precautions   18   Safety Interventions   Safety Precautions emergency equipment at bedside       Goal: Identify Related Risk Factors and Signs and Symptoms  Related risk factors and signs and symptoms are identified upon initiation of Human Response Clinical Practice Guideline (CPG)   Outcome: Ongoing (interventions implemented as appropriate)   18   Fall Risk   Related Risk Factors (Fall Risk) polypharmacy     Goal: Absence of Falls  Patient will demonstrate the  desired outcomes by discharge/transition of care.   Outcome: Ongoing (interventions implemented as appropriate)   07/21/18 1537   Fall Risk (Adult)   Absence of Falls making progress toward outcome

## 2018-07-21 NOTE — ASSESSMENT & PLAN NOTE
Troponin trending down, cath with clean coronaries 3 days ago, doubt ACS.  Cardiology was consulted in the ED for further recommendations.

## 2018-07-21 NOTE — ED TRIAGE NOTES
Patient presents to the ER with girlfriend. Patient presents with midsternal chest pain (pressure) and SOB x 2 days. Reports lightheadedness when standing up. Girlfriend reports that patient has passed out 2 times since being admitted and released. Patient was admitted on Tuesday and was released on Wednesday. Patient had an angiogram on Wednesday.  Denies n/v/d. 6/10 pain

## 2018-07-21 NOTE — PROGRESS NOTES
Discharge instructions read and understood. Patient escorted to vehicle on 3rd floor with nurse. Patient is discharged.

## 2018-07-21 NOTE — HPI
32 y.o. male with HTN and bipolar 1 disorder presents with a complaint of chest pain.  Pain is intermittent for the past few days, moderate, described as pressure, no known alleviating or exacerbating factors, no attempted self treatment.  Had a left heart cath with clean coronaries 3 days ago and normal LV function 3 days ago.  He also complains of having brief periods of unresponsiveness and stiffness for the past 2 weeks since starting depakote for bipolar disorder.  Denies fever, chills, cough, SOB, palpitations, orthopnea, PND, lower ext edema, dizziness, headaches, vision changes, abdominal pain, N/V/D, bloody or black stools, or dysuria.  EKG without evidence of acute ischemia, troponin mildly elevated but much lower than previous admission.  Routine labs, d-dimer, alcohol level, tox screen, and chest xray all unremarkable.  Placed in observation.

## 2018-07-21 NOTE — ED PROVIDER NOTES
"Encounter Date: 7/20/2018  SORT MSE:  Pt is a 32 y.o. male who presents for emergent consideration for chest pain. Pt will be moved to room when one is available, otherwise will wait in waiting room with triage nurse supervision.  Pt arrived by ambulatory. He is not in distress. Orders have been placed. TISH Muñoz DNP ACNP-BC 7/20/2018 9:40 PM     SCRIBE #1 NOTE: I, Mushtaq Lackey, am scribing for, and in the presence of,  Jasper Santillan MD. I have scribed the following portions of the note - Other sections scribed: HPI, ROS.       History     Chief Complaint   Patient presents with    Chest Pain     intermittent chest pain that started earlier today as well as right arm pain.      CC: Chest Pain    HPI: 33 y/o male with hx of bipolar 1 disorder presents to the ED c/o intermittent, moderate (6/10) chest pain that began earlier today. Pt describes the pain as "something on top of me". Pt is currently asymptomatic. Pt was hospitalized for an elevated troponin and NSTEMI x3 days ago. Since discharge, pt's significant other notes x2 syncopal episodes while at home, where pt is "stiff", awaking tired and confused. Pt denies bladder or bowel incontinence. Pt denies hx of DVTs, long travels, surgeries, or bleeding disorders.        The history is provided by the patient and the spouse. No  was used.     Review of patient's allergies indicates:  No Known Allergies  Past Medical History:   Diagnosis Date    Bipolar 1 disorder 2011    Hypertension      History reviewed. No pertinent surgical history.  Family History   Problem Relation Age of Onset    Cancer Mother     Heart disease Father     Diabetes Father      Social History   Substance Use Topics    Smoking status: Never Smoker    Smokeless tobacco: Never Used    Alcohol use No     Review of Systems   Constitutional: Negative for chills and fever.   HENT: Negative for congestion, ear pain, rhinorrhea and sore throat.    Eyes: Negative for " pain and redness.   Respiratory: Negative for cough and shortness of breath.    Cardiovascular: Positive for chest pain.   Gastrointestinal: Negative for abdominal pain, diarrhea, nausea and vomiting.   Genitourinary: Negative for dysuria, flank pain, frequency, hematuria and urgency.   Musculoskeletal: Negative for back pain and neck pain.   Skin: Negative for rash.   Neurological: Negative for weakness, light-headedness, numbness and headaches.   All other systems reviewed and are negative.      Physical Exam     Initial Vitals [07/20/18 2141]   BP Pulse Resp Temp SpO2   124/75 68 18 98.3 °F (36.8 °C) 98 %      MAP       --         Physical Exam    Vitals reviewed.  Constitutional: He appears well-developed and well-nourished.   HENT:   Head: Normocephalic and atraumatic.   Eyes: EOM are normal. Pupils are equal, round, and reactive to light.   Neck: Normal range of motion. Neck supple.   Cardiovascular: Normal rate, regular rhythm, normal heart sounds and intact distal pulses.   Pulmonary/Chest: Breath sounds normal. No respiratory distress. He has no wheezes. He has no rhonchi. He has no rales.   Abdominal: Soft. Bowel sounds are normal. He exhibits no distension. There is no tenderness. There is no rebound.   Musculoskeletal: Normal range of motion.   Neurological: He is alert and oriented to person, place, and time.   Skin: Skin is warm and dry.   Psychiatric: He has a normal mood and affect.         ED Course   Procedures  Labs Reviewed   CBC W/ AUTO DIFFERENTIAL - Abnormal; Notable for the following:        Result Value    Mono # 1.2 (*)     All other components within normal limits   TROPONIN I - Abnormal; Notable for the following:     Troponin I 0.033 (*)     All other components within normal limits   COMPREHENSIVE METABOLIC PANEL   APTT   DRUG SCREEN PANEL, URINE EMERGENCY   PROTIME-INR   ALCOHOL,MEDICAL (ETHANOL)   CK   LIPASE   D DIMER, QUANTITATIVE     EKG Readings: (Independently Interpreted)    Initial Reading: No STEMI. Rhythm: Normal Sinus Rhythm.       Imaging Results          X-Ray Chest AP Portable (Final result)  Result time 07/20/18 22:30:20    Final result by Jeronimo Vu MD (07/20/18 22:30:20)                 Impression:      No acute intrathoracic process identified.      Electronically signed by: Jeronimo Vu MD  Date:    07/20/2018  Time:    22:30             Narrative:    EXAMINATION:  XR CHEST AP PORTABLE    CLINICAL HISTORY:  chest pain;    TECHNIQUE:  Single frontal view of the chest was performed.    COMPARISON:  07/17/2018.    FINDINGS:  Cardiac silhouette is normal in size.  Lungs are symmetrically expanded.  No evidence of focal consolidative process, pneumothorax, or significant effusion.  No acute osseous abnormality identified.                              X-Rays:   Independently Interpreted Readings:   Chest X-Ray: Normal heart size.  No infiltrates.  No acute abnormalities.     Medical Decision Making:   History:   Old Medical Records: I decided to obtain old medical records.  Old Records Summarized: other records.       <> Summary of Records: Patient was recently admitted to the hospital for an NSTEMI.  The patient had a heart catheterization that was normal. Clean coronary arteries.  Initial Assessment:   Medical decision-making:    The patient received a medical screening exam. If performed, the EKG was independently evaluated by me and is pending final cardiology evaluation.  If performed, all radiographic studies were independently evaluated by me and are pending final radiology evaluation. If labs were ordered, they were reviewed. Vital signs are independently assessed by me.  If performed, the pulse oximetry was independently evaluated by me.  I decided to obtain the patient's past medical record.  If available, I reviewed the patient's past medical record, including most recent labs and radiology reports.    ED Management:  Patient presents to the emergency  department for re-evaluation of chest pain.  Patient had a clean heart catheterization 2 days ago.  Patient is also complaining of a syncopal episode versus what sounds like it is described as possible seizure-like activity.  Two days ago he had a normal head CT.   The patient still has an elevated troponin however this could be down trending.  I discussed the case with the hospitalist who recommended admission for serial troponins and re-evaluation by Cardiology for possible syncope.  I will also consult Neurology as the family is describing possible seizure-like episodes.     I discussed the patient's presentation and workup with the hospitalist who agrees with placing the patient in the hospital.  I have placed orders for the hospitalist.   The results and physical exam findings were reviewed with the patient. Pt agrees with assessment, disposition and treatment plan and has no further questions or complaints at this time.    JEANNE Santillan M.D. 1:40 AM 7/21/2018              Scribe Attestation:   Scribe #1: I performed the above scribed service and the documentation accurately describes the services I performed. I attest to the accuracy of the note.    Attending Attestation:           Physician Attestation for Scribe:  Physician Attestation Statement for Scribe #1: I, Jasper Santillan MD, reviewed documentation, as scribed by Mushtaq Lackey in my presence, and it is both accurate and complete.                    Clinical Impression:   The primary encounter diagnosis was Chest pain. Diagnoses of Elevated troponin and Syncope, unspecified syncope type were also pertinent to this visit.                             Jasper Santillan MD  07/21/18 0140

## 2018-07-21 NOTE — SUBJECTIVE & OBJECTIVE
Past Medical History:   Diagnosis Date    Bipolar 1 disorder 2011    Hypertension        History reviewed. No pertinent surgical history.    Review of patient's allergies indicates:  No Known Allergies    No current facility-administered medications on file prior to encounter.      Current Outpatient Prescriptions on File Prior to Encounter   Medication Sig    aspirin (ECOTRIN) 81 MG EC tablet Take 1 tablet (81 mg total) by mouth once daily.    atorvastatin (LIPITOR) 80 MG tablet Take 1 tablet (80 mg total) by mouth once daily.    carvedilol (COREG) 6.25 MG tablet Take 0.5 tablets (3.125 mg total) by mouth 2 (two) times daily.    divalproex (DEPAKOTE) 250 MG EC tablet Take 250 mg by mouth 3 (three) times daily. 250mg daily  250mg afternoon and 500mg nightly    QUEtiapine (SEROQUEL) 100 MG Tab Take by mouth every evening.     Family History     Problem Relation (Age of Onset)    Cancer Mother    Diabetes Father    Heart disease Father        Social History Main Topics    Smoking status: Never Smoker    Smokeless tobacco: Never Used    Alcohol use No    Drug use: No    Sexual activity: Yes     Partners: Female     Review of Systems   Constitutional: Negative for chills and fever.   Eyes: Negative for photophobia and visual disturbance.   Respiratory: Negative for cough and shortness of breath.    Cardiovascular: Positive for chest pain. Negative for palpitations and leg swelling.   Gastrointestinal: Negative for abdominal pain, diarrhea, nausea and vomiting.   Genitourinary: Negative for frequency, hematuria and urgency.   Skin: Negative for pallor, rash and wound.   Neurological: Positive for syncope. Negative for dizziness, facial asymmetry, speech difficulty, weakness, light-headedness, numbness and headaches.   Psychiatric/Behavioral: Negative for confusion and decreased concentration.     Objective:     Vital Signs (Most Recent):  Temp: 97.5 °F (36.4 °C) (07/21/18 1147)  Pulse: (!) 58 (07/21/18  1147)  Resp: 18 (07/21/18 1147)  BP: 118/71 (07/21/18 1147)  SpO2: 97 % (07/21/18 1147) Vital Signs (24h Range):  Temp:  [97.5 °F (36.4 °C)-98.3 °F (36.8 °C)] 97.5 °F (36.4 °C)  Pulse:  [52-68] 58  Resp:  [18] 18  SpO2:  [96 %-99 %] 97 %  BP: ()/(54-92) 118/71     Weight: 112.5 kg (248 lb 0.3 oz)  Body mass index is 31 kg/m².    Physical Exam   Constitutional: He is oriented to person, place, and time. He appears well-developed and well-nourished. No distress.   HENT:   Head: Normocephalic and atraumatic.   Right Ear: External ear normal.   Left Ear: External ear normal.   Nose: Nose normal.   Mouth/Throat: Oropharynx is clear and moist.   Eyes: Conjunctivae and EOM are normal. Pupils are equal, round, and reactive to light.   Neck: Normal range of motion. Neck supple.   Cardiovascular: Normal rate, regular rhythm and intact distal pulses.    Pulmonary/Chest: Effort normal and breath sounds normal. No respiratory distress. He has no wheezes. He has no rales.   Abdominal: Soft. Bowel sounds are normal. He exhibits no distension. There is no tenderness.   No palpable hepatomegaly or splenomegaly   Musculoskeletal: Normal range of motion. He exhibits no edema or tenderness.   Neurological: He is alert and oriented to person, place, and time.   Skin: Skin is warm and dry.   Psychiatric: He has a normal mood and affect. Thought content normal.   Nursing note and vitals reviewed.        CRANIAL NERVES     CN III, IV, VI   Pupils are equal, round, and reactive to light.  Extraocular motions are normal.        Significant Labs: All pertinent labs within the past 24 hours have been reviewed.    Significant Imaging: I have reviewed and interpreted all pertinent imaging results/findings within the past 24 hours.

## 2018-07-21 NOTE — DISCHARGE SUMMARY
[facility] Hospital Medicine  Discharge Summary      Patient Name: Vel Ordonez  MRN: [id number]  Admission Date: 7/20/2018  Hospital Length of Stay: 0 days  Discharge Date and Time: 7/21/2018  4:12 PM  Attending Physician: Irene Manzo MD  Discharging Provider: Buck Kline Jr, NP  Primary Care Provider: [facility]      HPI:   32 y.o. male with HTN and bipolar 1 disorder presents with a complaint of chest pain.  Pain is intermittent for the past few days, moderate, described as pressure, no known alleviating or exacerbating factors, no attempted self treatment.  Had a left heart cath with clean coronaries 3 days ago and normal LV function 3 days ago.  He also complains of having brief periods of unresponsiveness and stiffness for the past 2 weeks since starting depakote for bipolar disorder.  Denies fever, chills, cough, SOB, palpitations, orthopnea, PND, lower ext edema, dizziness, headaches, vision changes, abdominal pain, N/V/D, bloody or black stools, or dysuria.  EKG without evidence of acute ischemia, troponin mildly elevated but much lower than previous admission.  Routine labs, d-dimer, alcohol level, tox screen, and chest xray all unremarkable.  Placed in observation.    * No surgery found *      Hospital Course:   Mr. Ordonez was placed in observation for further evaluation and treatment after presenting with possible syncope and chest pain.  He was seen and evaluated by Cardiology and due to clean cath 3 days ago, no further intervention or treatment was recommended.  Prescription for PPI trial provided.  He was also seen and evaluated by Neurology with recommendation for discharge with outpatient Neurology follow up for EEG and no driving.  Referral placed to [facility].  Discussed with the patient and his wife.  Discharged home in stable condition.     Consults:   Consults         Status Ordering Provider     Inpatient consult to  Neurology  Once     Provider:  Hany Fields MD    Completed BRYAN LEE     Inpatient consult to Social Work  Once     Provider:  (Not yet assigned)    Acknowledged LEE RENE JR          No new Assessment & Plan notes have been filed under this hospital service since the last note was generated.  Service: Hospital Medicine    Final Active Diagnoses:    Diagnosis Date Noted POA    PRINCIPAL PROBLEM:  Chest pain [R07.9] 07/21/2018 Yes    Seizure [R56.9] 07/21/2018 Yes    Bipolar 1 disorder [F31.9] 07/18/2018 Yes     Chronic      Problems Resolved During this Admission:    Diagnosis Date Noted Date Resolved POA       Discharged Condition: stable    Disposition: Home or Self Care    Follow Up:  Follow-up Information     [facility].    Contact information:  [address] [place] [id number] [phone]                 Patient Instructions:     Diet Cardiac     Activity as tolerated         Significant Diagnostic Studies: Labs:   CMP   Recent Labs  Lab 07/20/18  2322      K 4.2      CO2 26   GLU 90   BUN 16   CREATININE 0.9   CALCIUM 9.3   PROT 7.5   ALBUMIN 4.1   BILITOT 0.3   ALKPHOS 72   AST 33   ALT 37   ANIONGAP 9   ESTGFRAFRICA >60   EGFRNONAA >60   , CBC   Recent Labs  Lab 07/20/18  2322   WBC 10.16   HGB 14.5   HCT 44.0       and Troponin   Recent Labs  Lab 07/21/18  0608   TROPONINI 0.027*       Pending Diagnostic Studies:     None         Medications:  Reconciled Home Medications:      Medication List      START taking these medications    pantoprazole 40 MG tablet  Commonly known as:  PROTONIX  Take 1 tablet (40 mg total) by mouth once daily.        CONTINUE taking these medications    aspirin 81 MG EC tablet  Commonly known as:  ECOTRIN  Take 1 tablet (81 mg total) by mouth once daily.     atorvastatin 80 MG tablet  Commonly known as:  LIPITOR  Take 1 tablet (80 mg total) by mouth once daily.     carvedilol 6.25 MG  tablet  Commonly known as:  COREG  Take 0.5 tablets (3.125 mg total) by mouth 2 (two) times daily.     divalproex 250 MG EC tablet  Commonly known as:  DEPAKOTE  Take 250 mg by mouth 3 (three) times daily. 250mg daily  250mg afternoon and 500mg nightly     QUEtiapine 100 MG Tab  Commonly known as:  SEROQUEL  Take by mouth every evening.            Indwelling Lines/Drains at time of discharge:   Lines/Drains/Airways          No matching active lines, drains, or airways          Time spent on the discharge of patient: less than 30 minutes  Patient was seen and examined on the date of discharge and determined to be suitable for discharge.         Buck Kline Jr, NP  Department of Hospital Medicine  [facility]

## 2018-07-21 NOTE — H&P
[facility] Hospital Medicine  History & Physical    Patient Name: Vel Ordonez  MRN: [id number]  Admission Date: 7/20/2018  Attending Physician: rIene Manzo MD   Primary Care Provider: Primary Doctor No         Patient information was obtained from patient, spouse/SO, past medical records and ER records.     Subjective:     Principal Problem:Chest pain    Chief Complaint:   Chief Complaint   Patient presents with    Chest Pain     intermittent chest pain that started earlier today as well as right arm pain.         HPI: 32 y.o. male with HTN and bipolar 1 disorder presents with a complaint of chest pain.  Pain is intermittent for the past few days, moderate, described as pressure, no known alleviating or exacerbating factors, no attempted self treatment.  Had a left heart cath with clean coronaries 3 days ago and normal LV function 3 days ago.  He also complains of having brief periods of unresponsiveness and stiffness for the past 2 weeks since starting depakote for bipolar disorder.  Denies fever, chills, cough, SOB, palpitations, orthopnea, PND, lower ext edema, dizziness, headaches, vision changes, abdominal pain, N/V/D, bloody or black stools, or dysuria.  EKG without evidence of acute ischemia, troponin mildly elevated but much lower than previous admission.  Routine labs, d-dimer, alcohol level, tox screen, and chest xray all unremarkable.  Placed in observation.    Past Medical History:   Diagnosis Date    Bipolar 1 disorder 2011    Hypertension        History reviewed. No pertinent surgical history.    Review of patient's allergies indicates:  No Known Allergies    No current facility-administered medications on file prior to encounter.      Current Outpatient Prescriptions on File Prior to Encounter   Medication Sig    aspirin (ECOTRIN) 81 MG EC tablet Take 1 tablet (81 mg total) by mouth once daily.    atorvastatin (LIPITOR) 80 MG tablet Take 1 tablet (80 mg total) by  mouth once daily.    carvedilol (COREG) 6.25 MG tablet Take 0.5 tablets (3.125 mg total) by mouth 2 (two) times daily.    divalproex (DEPAKOTE) 250 MG EC tablet Take 250 mg by mouth 3 (three) times daily. 250mg daily  250mg afternoon and 500mg nightly    QUEtiapine (SEROQUEL) 100 MG Tab Take by mouth every evening.     Family History     Problem Relation (Age of Onset)    Cancer Mother    Diabetes Father    Heart disease Father        Social History Main Topics    Smoking status: Never Smoker    Smokeless tobacco: Never Used    Alcohol use No    Drug use: No    Sexual activity: Yes     Partners: Female     Review of Systems   Constitutional: Negative for chills and fever.   Eyes: Negative for photophobia and visual disturbance.   Respiratory: Negative for cough and shortness of breath.    Cardiovascular: Positive for chest pain. Negative for palpitations and leg swelling.   Gastrointestinal: Negative for abdominal pain, diarrhea, nausea and vomiting.   Genitourinary: Negative for frequency, hematuria and urgency.   Skin: Negative for pallor, rash and wound.   Neurological: Positive for syncope. Negative for dizziness, facial asymmetry, speech difficulty, weakness, light-headedness, numbness and headaches.   Psychiatric/Behavioral: Negative for confusion and decreased concentration.     Objective:     Vital Signs (Most Recent):  Temp: 97.5 °F (36.4 °C) (07/21/18 1147)  Pulse: (!) 58 (07/21/18 1147)  Resp: 18 (07/21/18 1147)  BP: 118/71 (07/21/18 1147)  SpO2: 97 % (07/21/18 1147) Vital Signs (24h Range):  Temp:  [97.5 °F (36.4 °C)-98.3 °F (36.8 °C)] 97.5 °F (36.4 °C)  Pulse:  [52-68] 58  Resp:  [18] 18  SpO2:  [96 %-99 %] 97 %  BP: ()/(54-92) 118/71     Weight: 112.5 kg (248 lb 0.3 oz)  Body mass index is 31 kg/m².    Physical Exam   Constitutional: He is oriented to person, place, and time. He appears well-developed and well-nourished. No distress.   HENT:   Head: Normocephalic and atraumatic.   Right  Ear: External ear normal.   Left Ear: External ear normal.   Nose: Nose normal.   Mouth/Throat: Oropharynx is clear and moist.   Eyes: Conjunctivae and EOM are normal. Pupils are equal, round, and reactive to light.   Neck: Normal range of motion. Neck supple.   Cardiovascular: Normal rate, regular rhythm and intact distal pulses.    Pulmonary/Chest: Effort normal and breath sounds normal. No respiratory distress. He has no wheezes. He has no rales.   Abdominal: Soft. Bowel sounds are normal. He exhibits no distension. There is no tenderness.   No palpable hepatomegaly or splenomegaly   Musculoskeletal: Normal range of motion. He exhibits no edema or tenderness.   Neurological: He is alert and oriented to person, place, and time.   Skin: Skin is warm and dry.   Psychiatric: He has a normal mood and affect. Thought content normal.   Nursing note and vitals reviewed.        CRANIAL NERVES     CN III, IV, VI   Pupils are equal, round, and reactive to light.  Extraocular motions are normal.        Significant Labs: All pertinent labs within the past 24 hours have been reviewed.    Significant Imaging: I have reviewed and interpreted all pertinent imaging results/findings within the past 24 hours.    Assessment/Plan:     * Chest pain    Troponin trending down, cath with clean coronaries 3 days ago, doubt ACS.  Cardiology was consulted in the ED for further recommendations.        Seizure    Uncertain if these are definitively seizures, he has been seen and evaluated by Neurology with recommendation to continue depakote and outpatient Neurology follow up with EEG, no driving for now.        Bipolar 1 disorder    Stable continue depakote and seroquel          VTE Risk Mitigation         Ordered     IP VTE HIGH RISK PATIENT  Once      07/21/18 0238     Place SAMMIE hose  Until discontinued      07/21/18 0238     Place sequential compression device  Until discontinued      07/21/18 0238        Buck Kline Jr., APRN,  AGACNP-BC  Hospitalist - Department of Hospital Medicine  [facility] [address] [place] [place] [address]  Office #: [phone]; Pager #: [phone]

## 2018-08-01 ENCOUNTER — OFFICE VISIT (OUTPATIENT)
Dept: CARDIOLOGY | Facility: CLINIC | Age: 33
End: 2018-08-01
Payer: MEDICAID

## 2018-08-01 VITALS
BODY MASS INDEX: 32.81 KG/M2 | DIASTOLIC BLOOD PRESSURE: 72 MMHG | WEIGHT: 263.88 LBS | RESPIRATION RATE: 15 BRPM | HEIGHT: 75 IN | SYSTOLIC BLOOD PRESSURE: 136 MMHG | OXYGEN SATURATION: 96 % | HEART RATE: 62 BPM

## 2018-08-01 DIAGNOSIS — R07.9 CHEST PAIN, UNSPECIFIED TYPE: ICD-10-CM

## 2018-08-01 DIAGNOSIS — R79.89 ELEVATED TROPONIN: ICD-10-CM

## 2018-08-01 DIAGNOSIS — R56.9 SEIZURE: ICD-10-CM

## 2018-08-01 DIAGNOSIS — R55 SYNCOPE AND COLLAPSE: Primary | ICD-10-CM

## 2018-08-01 DIAGNOSIS — F31.9 BIPOLAR 1 DISORDER: Chronic | ICD-10-CM

## 2018-08-01 DIAGNOSIS — E78.2 MIXED HYPERLIPIDEMIA: ICD-10-CM

## 2018-08-01 PROBLEM — I21.4 NSTEMI (NON-ST ELEVATED MYOCARDIAL INFARCTION): Status: RESOLVED | Noted: 2018-07-17 | Resolved: 2018-08-01

## 2018-08-01 PROCEDURE — 99213 OFFICE O/P EST LOW 20 MIN: CPT | Mod: PBBFAC | Performed by: INTERNAL MEDICINE

## 2018-08-01 PROCEDURE — 99999 PR PBB SHADOW E&M-EST. PATIENT-LVL III: CPT | Mod: PBBFAC,,, | Performed by: INTERNAL MEDICINE

## 2018-08-01 PROCEDURE — 99214 OFFICE O/P EST MOD 30 MIN: CPT | Mod: S$PBB,,, | Performed by: INTERNAL MEDICINE

## 2018-08-01 NOTE — PROGRESS NOTES
CARDIOVASCULAR PROGRESS NOTE    REASON FOR CONSULT:   Vel Ordonez is a 32 y.o. male who presents for follow up of ?ACS, LOC.      HISTORY OF PRESENT ILLNESS:   The patient returns for follow-up of his recent hospitalization for possible ACS and syncope.  Subsequent catheterization revealed normal coronary arteries.  The patient however current syncopal episodes since then.  He is accompanied by his girlfriend today's visit who supplements a history.  She describes episodes of unawareness and tremor suggesting possible seizure.  The patient otherwise denies chest pain, shortness of breath, lightheadedness, PND, orthopnea, or lower extremity edema.  There has been no melena, hematuria, or claudicant symptoms.  It appears that he is no longer taking Plavix.    CARDIOVASCULAR HISTORY:   ?ACS 7/2018 with normal cors on cath    PAST MEDICAL HISTORY:     Past Medical History:   Diagnosis Date    Bipolar 1 disorder 2011    Hypertension        PAST SURGICAL HISTORY:   History reviewed. No pertinent surgical history.    ALLERGIES AND MEDICATION:   Review of patient's allergies indicates:  No Known Allergies  Previous Medications    ASPIRIN (ECOTRIN) 81 MG EC TABLET    Take 1 tablet (81 mg total) by mouth once daily.    ATORVASTATIN (LIPITOR) 80 MG TABLET    Take 1 tablet (80 mg total) by mouth once daily.    CARVEDILOL (COREG) 6.25 MG TABLET    Take 0.5 tablets (3.125 mg total) by mouth 2 (two) times daily.    DIVALPROEX (DEPAKOTE) 250 MG EC TABLET    Take 250 mg by mouth 3 (three) times daily. 250mg daily  250mg afternoon and 500mg nightly    PANTOPRAZOLE (PROTONIX) 40 MG TABLET    Take 1 tablet (40 mg total) by mouth once daily.    QUETIAPINE (SEROQUEL) 100 MG TAB    Take by mouth every evening.       SOCIAL HISTORY:     Social History     Social History    Marital status: Single     Spouse name: N/A    Number of children: N/A    Years of education: N/A     Occupational History    Not on file.     Social  "History Main Topics    Smoking status: Never Smoker    Smokeless tobacco: Never Used    Alcohol use No    Drug use: No    Sexual activity: Yes     Partners: Female     Other Topics Concern    Not on file     Social History Narrative    No narrative on file       FAMILY HISTORY:     Family History   Problem Relation Age of Onset    Cancer Mother     Heart disease Father     Diabetes Father        REVIEW OF SYSTEMS:   Review of Systems   Constitutional: Negative for chills, diaphoresis and fever.   HENT: Negative for nosebleeds.    Eyes: Negative for blurred vision, double vision and photophobia.   Respiratory: Negative for hemoptysis, shortness of breath and wheezing.    Cardiovascular: Negative for chest pain, palpitations, orthopnea, claudication, leg swelling and PND.   Gastrointestinal: Negative for abdominal pain, blood in stool, heartburn, melena, nausea and vomiting.   Genitourinary: Negative for flank pain and hematuria.   Musculoskeletal: Negative for falls, myalgias and neck pain.   Skin: Negative for rash.   Neurological: Positive for seizures and loss of consciousness. Negative for dizziness, weakness and headaches.   Endo/Heme/Allergies: Negative for polydipsia. Does not bruise/bleed easily.   Psychiatric/Behavioral: Negative for depression and memory loss. The patient is not nervous/anxious.        PHYSICAL EXAM:     Vitals:    08/01/18 1244   BP: 136/72   Pulse: 62   Resp: 15    Body mass index is 32.98 kg/m².  Weight: 119.7 kg (263 lb 14.3 oz)   Height: 6' 3" (190.5 cm)     Physical Exam   Constitutional: He is oriented to person, place, and time. He appears well-developed and well-nourished. He is cooperative.  Non-toxic appearance. No distress.   HENT:   Head: Normocephalic and atraumatic.   Eyes: Conjunctivae and EOM are normal. Pupils are equal, round, and reactive to light. No scleral icterus.   Neck: Trachea normal and normal range of motion. Neck supple. Normal carotid pulses and no " JVD present. Carotid bruit is not present. No neck rigidity. No tracheal deviation and no edema present. No thyromegaly present.   Cardiovascular: Normal rate, regular rhythm, S1 normal and S2 normal.  PMI is not displaced.  Exam reveals no gallop and no friction rub.    No murmur heard.  Pulses:       Carotid pulses are 2+ on the right side, and 2+ on the left side.  R rad access site c/d/i.   Pulmonary/Chest: Effort normal and breath sounds normal. No respiratory distress. He has no wheezes. He has no rales. He exhibits no tenderness.   Abdominal: Soft. He exhibits no distension. There is no hepatosplenomegaly.   Musculoskeletal: He exhibits no edema or tenderness.   Feet:   Right Foot:   Skin Integrity: Negative for ulcer.   Left Foot:   Skin Integrity: Negative for ulcer.   Neurological: He is alert and oriented to person, place, and time. No cranial nerve deficit.   Skin: Skin is warm and dry. No rash noted. No erythema.   Psychiatric: He has a normal mood and affect. His speech is normal and behavior is normal.   Vitals reviewed.      DATA:   EKG: (personally reviewed tracing)  7/17/18 SR 87, IRBBB    Laboratory:  CBC:    Recent Labs  Lab 07/17/18  2140 07/18/18  0153 07/20/18  2322   WHITE BLOOD CELL COUNT 14.22 H 10.75 10.16   HEMOGLOBIN 15.3 13.8 L 14.5   HEMATOCRIT 45.9 42.4 44.0   PLATELETS 350 324 301       CHEMISTRIES:    Recent Labs  Lab 07/08/18  1637 07/18/18  0153 07/20/18  2322   GLUCOSE 167 H 89 90   SODIUM 141 144 138   POTASSIUM 3.8 4.0 4.2   BUN BLD 13 14 16   CREATININE 1.0 1.0 0.9   EGFR IF AFR[place] AMERICAN >60.0 >60 >60   EGFR IF NON- >60.0 >60 >60   CALCIUM 10.0 9.2 9.3   MAGNESIUM  --  2.3  --        CARDIAC BIOMARKERS:    Recent Labs  Lab 07/17/18  2140  07/18/18  1517 07/20/18  2322 07/21/18  0608     --   --  98  --    TROPONIN I  --   < > 0.106 H 0.033 H 0.027 H   < > = values in this interval not displayed.    COAGS:    Recent Labs  Lab 07/18/18  0879  07/20/18  2322   INR 1.0 1.1       LIPIDS/LFTS:    Recent Labs  Lab 07/08/18  1637 07/18/18  0153 07/18/18  0808 07/20/18  2322   CHOLESTEROL  --   --  129  --    TRIGLYCERIDES  --   --  156 H  --    HDL  --   --  20 L  --    LDL CHOLESTEROL  --   --  77.8  --    NON-HDL CHOLESTEROL  --   --  109  --    AST 18 15  --  33   ALT 26 22  --  37       Cardiovascular Testing:  Cath 7/18/18  LVEDP: 12mmHg  LVEF: 55% by echo  Wall Motion: normal by echo  Dominance: Left  LM: normal  LAD: normal  Ramus: normal  LCx: dom, normal  RCA: nondom, normal  Hemostasis:  R Radial band  Impression:  ACS  Normal cors/LV fxn  R rad vasband  Plan:  Cont med rx  ASA 81mg qd  Plavix 75mg qd for 1 year (thru 7/2018)  BBl/Statin +/- ACEi  Home today  Follow up with Dr. Lozano 2-3 weeks    Echo 7/18/18    1 - Normal left ventricular systolic function (EF 55-60%).     2 - No wall motion abnormalities.     3 - Concentric hypertrophy.     4 - Upper limit of normal left ventricular enlargement.     5 - Low normal right ventricular systolic function .     6 - Trivial mitral regurgitation.     7 - Trivial tricuspid regurgitation.     8 - The estimated PA systolic pressure is 29 mmHg.       ASSESSMENT:   # ?ACS, normal cors on cath 7/2018  # HLP on atorva 80mg  # recurrent syncope, ?seizure  # BMI 33    PLAN:   Cont med rx  EVM  Neuro eval pending  Driving restriction, pt otherwise cleared to return to work  Check lipids/LFT mid Oct 2018  RTC 2 months        Asher Lozano MD, FACC

## 2018-08-10 ENCOUNTER — HOSPITAL ENCOUNTER (EMERGENCY)
Facility: HOSPITAL | Age: 33
Discharge: HOME | End: 2018-08-10
Attending: EMERGENCY MEDICINE
Payer: MEDICAID

## 2018-08-10 ENCOUNTER — CLINICAL SUPPORT (OUTPATIENT)
Dept: ELECTROPHYSIOLOGY | Facility: CLINIC | Age: 33
End: 2018-08-10
Attending: INTERNAL MEDICINE
Payer: MEDICAID

## 2018-08-10 VITALS
HEART RATE: 70 BPM | DIASTOLIC BLOOD PRESSURE: 79 MMHG | OXYGEN SATURATION: 100 % | WEIGHT: 250 LBS | BODY MASS INDEX: 31.08 KG/M2 | RESPIRATION RATE: 16 BRPM | TEMPERATURE: 98 F | HEIGHT: 75 IN | SYSTOLIC BLOOD PRESSURE: 134 MMHG

## 2018-08-10 DIAGNOSIS — R55 SYNCOPE AND COLLAPSE: ICD-10-CM

## 2018-08-10 DIAGNOSIS — M79.603 ARM PAIN: Primary | ICD-10-CM

## 2018-08-10 PROCEDURE — 96372 THER/PROPH/DIAG INJ SC/IM: CPT

## 2018-08-10 PROCEDURE — 25000003 PHARM REV CODE 250: Performed by: PHYSICIAN ASSISTANT

## 2018-08-10 PROCEDURE — 99284 EMERGENCY DEPT VISIT MOD MDM: CPT | Mod: 25

## 2018-08-10 PROCEDURE — 63600175 PHARM REV CODE 636 W HCPCS: Performed by: PHYSICIAN ASSISTANT

## 2018-08-10 PROCEDURE — 99283 EMERGENCY DEPT VISIT LOW MDM: CPT | Mod: ,,, | Performed by: EMERGENCY MEDICINE

## 2018-08-10 RX ORDER — ACETAMINOPHEN 500 MG
1000 TABLET ORAL
Status: COMPLETED | OUTPATIENT
Start: 2018-08-10 | End: 2018-08-10

## 2018-08-10 RX ORDER — KETOROLAC TROMETHAMINE 30 MG/ML
15 INJECTION, SOLUTION INTRAMUSCULAR; INTRAVENOUS
Status: COMPLETED | OUTPATIENT
Start: 2018-08-10 | End: 2018-08-10

## 2018-08-10 RX ADMIN — ACETAMINOPHEN 1000 MG: 500 TABLET ORAL at 08:08

## 2018-08-10 RX ADMIN — KETOROLAC TROMETHAMINE 15 MG: 30 INJECTION, SOLUTION INTRAMUSCULAR at 08:08

## 2018-08-10 NOTE — DISCHARGE INSTRUCTIONS
I am not sure what is causing your arm pain but I believe we ruled out any emergent causes. Please schedule an appointment with your Primary Care doctor for follow up. Can you take Tylenol up to 3 grams (6 x 500mg extra strength tablets) daily and ibuprofen up to 2400mg daily for pain. If the arm starts to swell up, if you have numbness or weakness, please come back to the Emergency Department.

## 2018-08-10 NOTE — ED PROVIDER NOTES
Encounter Date: 8/10/2018    SCRIBE #1 NOTE: I, Genevieve Mcwilliams, am scribing for, and in the presence of,  Dr. Henry. I have scribed the following portions of the note - the APC attestation.       History     Chief Complaint   Patient presents with    Arm Problem     onset rt arm pain radiating to rt side  2 days ago- denies trauma.      32 year old male with Bipolar 1 disorder and hypertension presents for non traumatic right arm pain x 2 days. Patient reports gradual onset of sharp, squeezing arm pain which radiates up and down the arm. Denies any palliating or provoking factors. Reports associated transient numbness without weakness or decreased range of motion. Reports history of DVT and cardiac catheterization 1 month ago which was threaded through the right radial artery.           Review of patient's allergies indicates:  No Known Allergies  Past Medical History:   Diagnosis Date    Bipolar 1 disorder 2011    Hypertension      History reviewed. No pertinent surgical history.  Family History   Problem Relation Age of Onset    Cancer Mother     Heart disease Father     Diabetes Father      Social History   Substance Use Topics    Smoking status: Never Smoker    Smokeless tobacco: Never Used    Alcohol use No     Review of Systems   Constitutional: Negative for chills, fatigue and fever.   Respiratory: Negative for cough and shortness of breath.    Cardiovascular: Negative for chest pain and palpitations.   Gastrointestinal: Negative for abdominal pain, constipation, diarrhea, nausea and vomiting.   Endocrine: Negative for polyuria.   Genitourinary: Negative for difficulty urinating, dysuria, frequency, hematuria and urgency.   Musculoskeletal: Negative for arthralgias, gait problem, neck pain and neck stiffness.   Skin: Negative for color change, rash and wound.   Neurological: Negative for light-headedness and headaches.   Hematological: Does not bruise/bleed easily.       Physical Exam     Initial  Vitals [08/10/18 0723]   BP Pulse Resp Temp SpO2   134/79 70 16 97.8 °F (36.6 °C) 100 %      MAP       --         Physical Exam    Nursing note and vitals reviewed.  Constitutional: He appears well-developed and well-nourished. He is not diaphoretic. No distress.   HENT:   Head: Normocephalic and atraumatic.   Eyes: EOM are normal. Pupils are equal, round, and reactive to light.   Neck: Normal range of motion. Neck supple.   No posterior midline tenderness   Cardiovascular: Normal rate, regular rhythm, normal heart sounds and intact distal pulses.   Pulmonary/Chest: Breath sounds normal.   Abdominal: Soft. Bowel sounds are normal.   Musculoskeletal: Normal range of motion. He exhibits tenderness. He exhibits no edema.   Diffuse tenderness to palpation of right arm. No erythema, edema or warmth   Neurological: He is alert and oriented to person, place, and time. He has normal strength. No cranial nerve deficit or sensory deficit.   Skin: Skin is warm and dry. No erythema.   Psychiatric: He has a normal mood and affect.         ED Course   Procedures  Labs Reviewed - No data to display       Imaging Results          US Upper Extremity Arteries Right (Final result)  Result time 08/10/18 09:51:11    Final result by Alonso Jimenez MD (08/10/18 09:51:11)                 Impression:      No hemodynamically significant stenosis demonstrated in the right upper extremity arterial system.    Electronically signed by resident: Logan Soto MD  Date:    08/10/2018  Time:    09:43    Electronically signed by: Alonso Jimenez MD  Date:    08/10/2018  Time:    09:51             Narrative:    EXAMINATION:  US UPPER EXTREMITY ARTERIES RIGHT    TECHNIQUE:  Right upper extremity arterial duplex ultrasound examination performed. Multiple gray scale and color doppler images were obtained in addition to waveform analysis.    COMPARISON:  None    FINDINGS:  The peak systolic velocities on the right are as follows, in  centimeters/second:    Subclavian artery proximally: 93    Subclavian artery distally: 101    Axillary artery: 70    Brachial artery-    Proximal: 86    Mid: 61    Distal: 89    Radial artery: 73    Ulnar artery: 58    Blood pressure: 134/81    The peak systolic velocities on the left are as follows, in centimeters/second:    Subclavian artery proximally: 110    Subclavian artery distally: 126    Blood pressure: 129/75.                               US Upper Extremity Veins Right (Final result)  Result time 08/10/18 09:51:27    Final result by Alonso Jimenez MD (08/10/18 09:51:27)                 Impression:      No thrombus in central veins of the right upper extremity.    Electronically signed by resident: Logan Soto MD  Date:    08/10/2018  Time:    09:46    Electronically signed by: Alonso Jimenez MD  Date:    08/10/2018  Time:    09:51             Narrative:    EXAMINATION:  US UPPER EXTREMITY VEINS RIGHT    CLINICAL HISTORY:  arm pain;    TECHNIQUE:  Duplex and color flow Doppler evaluation and dynamic compression was performed of the right upper extremity veins.    COMPARISON:  None    FINDINGS:  Central veins: The internal jugular, subclavian, and axillary veins are patent and free of thrombus.    Arm veins: The brachial, basilic, and cephalic veins are patent and compressible.    Contralateral subclavian/internal jugular veins: The left subclavian and internal jugular veins are patent and free of thrombus.    Other findings: None.                                 Medical Decision Making:   History:   Old Medical Records: I decided to obtain old medical records.  Clinical Tests:   Radiological Study: Ordered and Reviewed       APC / Resident Notes:   32 year old male presenting with non traumatic arm pain. Arm diffusely tender to palpation, no bony or point tenderness. No skin changes or edema, sensation intact. DDx includes muscle strain, radiculopathy, DVT, arterial injury. Will give toradol and  tylenol and do US veins and arteries.    US without acute abnormality. No relief of pain from NSAIDs. Source of pain unclear, I suspect neuropathic. Given negative US, I suspect benign etiology, will discharge with instructions to follow up with PCP. Discussed the importance of follow up, ED return precautions given. Patient voiced understanding and is comfortable with discharge. I discussed this patient with my supervising physician.    ELENITA Radford Attestation:   Scribe #1: I performed the above scribed service and the documentation accurately describes the services I performed. I attest to the accuracy of the note.    Attending Attestation:     Physician Attestation Statement for NP/PA:   I have conducted a face to face encounter with this patient in addition to the NP/PA, due to Medical Complexity    Other NP/PA Attestation Additions:    History of Present Illness: 32 y.o. male with atraumatic right arm pain. No discrete bony tenderness or trauma to suggest fracture. Full active ROM. Soft compartments and neurovascularly intact. Ultrasound of arterial and venous system normal. Will discharge with conservative management and return precautions.         Physician Attestation for Scribe:      Comments: I, Dr. Sherif Henry, personally performed the services described in this documentation. All medical record entries made by the scribe were at my direction and in my presence.  I have reviewed the chart and agree that the record reflects my personal performance and is accurate and complete. Sherif Henry MD.  12:10 PM 08/10/2018                 Clinical Impression:   The encounter diagnosis was Arm pain.      Disposition:   Disposition: Discharged  Condition: Stable                        Luz Maria Alonso PA-C  08/10/18 2012

## 2018-08-10 NOTE — ED TRIAGE NOTES
Presents to ER with complaint of right arm and shoulder pain for two days that is worse with movement.  Denies injury.  Patient's name and date of birth checked and is correct.  LOC: The patient is awake, alert and aware of environment with an appropriate affect, the patient is oriented x 3 and speaking appropriately.  APPEARANCE: Patient resting comfortably and in no acute distress, patient is clean and well groomed, patient's clothing is properly fastened.  CARDIOVASCULAR:  Heart rate regular and even with no peripheral edema noted.  SKIN: The skin is warm and dry, patient has normal skin turgor and moist mucus membranes, skin intact, no breakdown or brusing noted. MUSKULOSKELETAL: Patient moving all extremities well, no obvious swelling or deformities noted.  RESPIRATORY: Airway is open and patent, respirations are spontaneous, patient has a normal effort and rate.

## 2018-08-15 ENCOUNTER — PATIENT MESSAGE (OUTPATIENT)
Dept: CARDIOLOGY | Facility: CLINIC | Age: 33
End: 2018-08-15

## 2018-08-21 ENCOUNTER — PATIENT MESSAGE (OUTPATIENT)
Dept: ADMINISTRATIVE | Facility: OTHER | Age: 33
End: 2018-08-21

## 2018-08-27 ENCOUNTER — DOCUMENTATION ONLY (OUTPATIENT)
Dept: ELECTROPHYSIOLOGY | Facility: CLINIC | Age: 33
End: 2018-08-27

## 2018-08-27 NOTE — PROGRESS NOTES
The above patient is wearing a 30 day cardiac event monitor. Patients sent in a patient activated alert which is noted as Sinus Aleks/ Sinus Arrhythmia with the rates of 56-59 beats per minute. Monitoring company contacted patient. Patient symptoms noted as passing out. Patient stated that he fell on the bed when passed out , No injuries. Dr Lozano was notified. Reports will be placed under media tab for further review.

## 2018-09-01 ENCOUNTER — HOSPITAL ENCOUNTER (OUTPATIENT)
Facility: HOSPITAL | Age: 33
Discharge: HOME | End: 2018-09-02
Attending: EMERGENCY MEDICINE | Admitting: EMERGENCY MEDICINE
Payer: MEDICAID

## 2018-09-01 DIAGNOSIS — E78.2 MIXED HYPERLIPIDEMIA: ICD-10-CM

## 2018-09-01 DIAGNOSIS — R55 CARDIAC RELATED SYNCOPE: ICD-10-CM

## 2018-09-01 DIAGNOSIS — R40.20 LOC (LOSS OF CONSCIOUSNESS): ICD-10-CM

## 2018-09-01 DIAGNOSIS — I47.10 PAROXYSMAL SVT (SUPRAVENTRICULAR TACHYCARDIA): ICD-10-CM

## 2018-09-01 DIAGNOSIS — R55 SYNCOPE AND COLLAPSE: Primary | ICD-10-CM

## 2018-09-01 DIAGNOSIS — R55 SYNCOPE, CARDIOGENIC: ICD-10-CM

## 2018-09-01 DIAGNOSIS — I47.10 PSVT (PAROXYSMAL SUPRAVENTRICULAR TACHYCARDIA): ICD-10-CM

## 2018-09-01 DIAGNOSIS — F31.9 BIPOLAR 1 DISORDER: Chronic | ICD-10-CM

## 2018-09-01 DIAGNOSIS — I25.2 HISTORY OF NON-ST ELEVATION MYOCARDIAL INFARCTION (NSTEMI): ICD-10-CM

## 2018-09-01 LAB
ALBUMIN SERPL BCP-MCNC: 3.9 G/DL
ALP SERPL-CCNC: 79 U/L
ALT SERPL W/O P-5'-P-CCNC: 63 U/L
AMPHET+METHAMPHET UR QL: NEGATIVE
ANION GAP SERPL CALC-SCNC: 10 MMOL/L
AST SERPL-CCNC: 30 U/L
BARBITURATES UR QL SCN>200 NG/ML: NEGATIVE
BASOPHILS # BLD AUTO: 0.08 K/UL
BASOPHILS NFR BLD: 0.6 %
BENZODIAZ UR QL SCN>200 NG/ML: NEGATIVE
BILIRUB SERPL-MCNC: 0.4 MG/DL
BILIRUB UR QL STRIP: NEGATIVE
BUN SERPL-MCNC: 19 MG/DL
BZE UR QL SCN: NEGATIVE
CALCIUM SERPL-MCNC: 9.3 MG/DL
CANNABINOIDS UR QL SCN: NEGATIVE
CHLORIDE SERPL-SCNC: 109 MMOL/L
CLARITY UR REFRACT.AUTO: CLEAR
CO2 SERPL-SCNC: 24 MMOL/L
COLOR UR AUTO: YELLOW
CREAT SERPL-MCNC: 1 MG/DL
CREAT UR-MCNC: 197 MG/DL
DIFFERENTIAL METHOD: ABNORMAL
EOSINOPHIL # BLD AUTO: 0.3 K/UL
EOSINOPHIL NFR BLD: 2 %
ERYTHROCYTE [DISTWIDTH] IN BLOOD BY AUTOMATED COUNT: 14 %
EST. GFR  (AFRICAN AMERICAN): >60 ML/MIN/1.73 M^2
EST. GFR  (NON AFRICAN AMERICAN): >60 ML/MIN/1.73 M^2
GLUCOSE SERPL-MCNC: 109 MG/DL
GLUCOSE UR QL STRIP: NEGATIVE
HCT VFR BLD AUTO: 45.9 %
HGB BLD-MCNC: 15.3 G/DL
HGB UR QL STRIP: NEGATIVE
IMM GRANULOCYTES # BLD AUTO: 0.06 K/UL
IMM GRANULOCYTES NFR BLD AUTO: 0.4 %
KETONES UR QL STRIP: NEGATIVE
LEUKOCYTE ESTERASE UR QL STRIP: NEGATIVE
LYMPHOCYTES # BLD AUTO: 3 K/UL
LYMPHOCYTES NFR BLD: 20.7 %
MAGNESIUM SERPL-MCNC: 2.2 MG/DL
MCH RBC QN AUTO: 30.9 PG
MCHC RBC AUTO-ENTMCNC: 33.3 G/DL
MCV RBC AUTO: 93 FL
METHADONE UR QL SCN>300 NG/ML: NEGATIVE
MONOCYTES # BLD AUTO: 1.6 K/UL
MONOCYTES NFR BLD: 10.8 %
NEUTROPHILS # BLD AUTO: 9.4 K/UL
NEUTROPHILS NFR BLD: 65.5 %
NITRITE UR QL STRIP: NEGATIVE
NRBC BLD-RTO: 0 /100 WBC
OPIATES UR QL SCN: NEGATIVE
PCP UR QL SCN>25 NG/ML: NEGATIVE
PH UR STRIP: 6 [PH] (ref 5–8)
PLATELET # BLD AUTO: 339 K/UL
PMV BLD AUTO: 10.7 FL
POTASSIUM SERPL-SCNC: 3.9 MMOL/L
PROT SERPL-MCNC: 7.5 G/DL
PROT UR QL STRIP: NEGATIVE
RBC # BLD AUTO: 4.95 M/UL
SODIUM SERPL-SCNC: 143 MMOL/L
SP GR UR STRIP: 1.03 (ref 1–1.03)
TOXICOLOGY INFORMATION: NORMAL
TROPONIN I SERPL DL<=0.01 NG/ML-MCNC: <0.006 NG/ML
URN SPEC COLLECT METH UR: ABNORMAL
UROBILINOGEN UR STRIP-ACNC: ABNORMAL EU/DL
WBC # BLD AUTO: 14.4 K/UL

## 2018-09-01 PROCEDURE — 99220 PR INITIAL OBSERVATION CARE,LEVL III: CPT | Mod: ,,, | Performed by: INTERNAL MEDICINE

## 2018-09-01 PROCEDURE — 83735 ASSAY OF MAGNESIUM: CPT

## 2018-09-01 PROCEDURE — 80053 COMPREHEN METABOLIC PANEL: CPT

## 2018-09-01 PROCEDURE — 63600175 PHARM REV CODE 636 W HCPCS: Performed by: INTERNAL MEDICINE

## 2018-09-01 PROCEDURE — 93005 ELECTROCARDIOGRAM TRACING: CPT

## 2018-09-01 PROCEDURE — 85025 COMPLETE CBC W/AUTO DIFF WBC: CPT

## 2018-09-01 PROCEDURE — 25000003 PHARM REV CODE 250: Performed by: INTERNAL MEDICINE

## 2018-09-01 PROCEDURE — 99285 EMERGENCY DEPT VISIT HI MDM: CPT | Mod: 25

## 2018-09-01 PROCEDURE — 81003 URINALYSIS AUTO W/O SCOPE: CPT | Mod: 59

## 2018-09-01 PROCEDURE — 93010 ELECTROCARDIOGRAM REPORT: CPT | Mod: ,,, | Performed by: INTERNAL MEDICINE

## 2018-09-01 PROCEDURE — 99285 EMERGENCY DEPT VISIT HI MDM: CPT | Mod: ,,, | Performed by: PHYSICIAN ASSISTANT

## 2018-09-01 PROCEDURE — 84484 ASSAY OF TROPONIN QUANT: CPT

## 2018-09-01 PROCEDURE — G0378 HOSPITAL OBSERVATION PER HR: HCPCS

## 2018-09-01 PROCEDURE — 80307 DRUG TEST PRSMV CHEM ANLYZR: CPT

## 2018-09-01 RX ORDER — AMOXICILLIN 250 MG
1 CAPSULE ORAL 2 TIMES DAILY PRN
Status: DISCONTINUED | OUTPATIENT
Start: 2018-09-01 | End: 2018-09-02 | Stop reason: HOSPADM

## 2018-09-01 RX ORDER — RAMELTEON 8 MG/1
8 TABLET ORAL NIGHTLY PRN
Status: DISCONTINUED | OUTPATIENT
Start: 2018-09-01 | End: 2018-09-02 | Stop reason: HOSPADM

## 2018-09-01 RX ORDER — DIVALPROEX SODIUM 250 MG/1
500 TABLET, DELAYED RELEASE ORAL 3 TIMES DAILY
Status: DISCONTINUED | OUTPATIENT
Start: 2018-09-01 | End: 2018-09-01

## 2018-09-01 RX ORDER — ASPIRIN 81 MG/1
81 TABLET ORAL DAILY
Status: DISCONTINUED | OUTPATIENT
Start: 2018-09-02 | End: 2018-09-02 | Stop reason: HOSPADM

## 2018-09-01 RX ORDER — IBUPROFEN 200 MG
16 TABLET ORAL
Status: DISCONTINUED | OUTPATIENT
Start: 2018-09-01 | End: 2018-09-02

## 2018-09-01 RX ORDER — ENOXAPARIN SODIUM 100 MG/ML
40 INJECTION SUBCUTANEOUS EVERY 24 HOURS
Status: DISCONTINUED | OUTPATIENT
Start: 2018-09-01 | End: 2018-09-02 | Stop reason: HOSPADM

## 2018-09-01 RX ORDER — GLUCAGON 1 MG
1 KIT INJECTION
Status: DISCONTINUED | OUTPATIENT
Start: 2018-09-01 | End: 2018-09-02

## 2018-09-01 RX ORDER — ONDANSETRON 8 MG/1
8 TABLET, ORALLY DISINTEGRATING ORAL EVERY 8 HOURS PRN
Status: DISCONTINUED | OUTPATIENT
Start: 2018-09-01 | End: 2018-09-02 | Stop reason: HOSPADM

## 2018-09-01 RX ORDER — SODIUM CHLORIDE 0.9 % (FLUSH) 0.9 %
5 SYRINGE (ML) INJECTION
Status: DISCONTINUED | OUTPATIENT
Start: 2018-09-01 | End: 2018-09-02 | Stop reason: HOSPADM

## 2018-09-01 RX ORDER — PANTOPRAZOLE SODIUM 40 MG/1
40 TABLET, DELAYED RELEASE ORAL DAILY
Status: DISCONTINUED | OUTPATIENT
Start: 2018-09-02 | End: 2018-09-02 | Stop reason: HOSPADM

## 2018-09-01 RX ORDER — ATORVASTATIN CALCIUM 20 MG/1
80 TABLET, FILM COATED ORAL DAILY
Status: DISCONTINUED | OUTPATIENT
Start: 2018-09-02 | End: 2018-09-02 | Stop reason: HOSPADM

## 2018-09-01 RX ORDER — DIVALPROEX SODIUM 250 MG/1
500 TABLET, DELAYED RELEASE ORAL NIGHTLY
Status: DISCONTINUED | OUTPATIENT
Start: 2018-09-01 | End: 2018-09-02 | Stop reason: HOSPADM

## 2018-09-01 RX ORDER — DIVALPROEX SODIUM 250 MG/1
500 TABLET, DELAYED RELEASE ORAL
Status: DISCONTINUED | OUTPATIENT
Start: 2018-09-02 | End: 2018-09-02 | Stop reason: HOSPADM

## 2018-09-01 RX ORDER — QUETIAPINE FUMARATE 100 MG/1
100 TABLET, FILM COATED ORAL NIGHTLY
Status: DISCONTINUED | OUTPATIENT
Start: 2018-09-01 | End: 2018-09-02 | Stop reason: HOSPADM

## 2018-09-01 RX ORDER — DIVALPROEX SODIUM 250 MG/1
250 TABLET, DELAYED RELEASE ORAL DAILY
Status: DISCONTINUED | OUTPATIENT
Start: 2018-09-02 | End: 2018-09-02 | Stop reason: HOSPADM

## 2018-09-01 RX ORDER — PROMETHAZINE HYDROCHLORIDE 12.5 MG/1
25 TABLET ORAL EVERY 6 HOURS PRN
Status: DISCONTINUED | OUTPATIENT
Start: 2018-09-01 | End: 2018-09-02 | Stop reason: HOSPADM

## 2018-09-01 RX ORDER — IBUPROFEN 200 MG
24 TABLET ORAL
Status: DISCONTINUED | OUTPATIENT
Start: 2018-09-01 | End: 2018-09-02

## 2018-09-01 RX ORDER — OXYCODONE HYDROCHLORIDE 5 MG/1
10 TABLET ORAL EVERY 6 HOURS PRN
Status: DISCONTINUED | OUTPATIENT
Start: 2018-09-01 | End: 2018-09-02

## 2018-09-01 RX ORDER — CARVEDILOL 3.12 MG/1
3.12 TABLET ORAL 2 TIMES DAILY
Status: DISCONTINUED | OUTPATIENT
Start: 2018-09-01 | End: 2018-09-02

## 2018-09-01 RX ORDER — OXYCODONE HYDROCHLORIDE 5 MG/1
5 TABLET ORAL EVERY 6 HOURS PRN
Status: DISCONTINUED | OUTPATIENT
Start: 2018-09-01 | End: 2018-09-02 | Stop reason: HOSPADM

## 2018-09-01 RX ORDER — ACETAMINOPHEN 325 MG/1
650 TABLET ORAL EVERY 4 HOURS PRN
Status: DISCONTINUED | OUTPATIENT
Start: 2018-09-01 | End: 2018-09-02 | Stop reason: HOSPADM

## 2018-09-01 RX ADMIN — DIVALPROEX SODIUM 500 MG: 250 TABLET, DELAYED RELEASE ORAL at 09:09

## 2018-09-01 RX ADMIN — CARVEDILOL 3.12 MG: 3.12 TABLET, FILM COATED ORAL at 09:09

## 2018-09-01 RX ADMIN — QUETIAPINE FUMARATE 100 MG: 100 TABLET ORAL at 08:09

## 2018-09-01 RX ADMIN — POTASSIUM BICARBONATE 25 MEQ: 25 TABLET, EFFERVESCENT ORAL at 09:09

## 2018-09-01 RX ADMIN — ENOXAPARIN SODIUM 40 MG: 40 INJECTION, SOLUTION INTRAVENOUS; SUBCUTANEOUS at 09:09

## 2018-09-01 NOTE — ED TRIAGE NOTES
Patient presents to ED loss of consciousness around noon today while he was at work. He states that he suddenly felt weak and sweaty and the next thing he knew he was leaning over his work cart. He denies falling because he said that he was still upright when he regained consciousness. He was wearing a 30 day cardiac monitor at the time of the episode. They were instructed by cardiac monitoring staff to drop off the monitor at home then report to ER.

## 2018-09-01 NOTE — ED NOTES
Patient resting in stretcher and is in no acute distress at this time. Patient is awake, alert, and oriented to person, time, place, and situation. Respirations even and unlabored. Patient updated on plan of care and voices no needs at this time. Stretcher low and locked with both side rails up and call bell within patient's reach.

## 2018-09-01 NOTE — ED NOTES
Pt identifiers for Vel Ordonez 32 y.o. male checked and correct.    Patient lying in stretcher, appears to be resting comfortably and in no acute distress. Patient is clean and well groomed and clothing is properly fastened.    NEUROLOGICAL: The patient is awake, alert and oriented to person, time, place, and situation and speaking appropriately. Eyes open spontaneously, behavior appropriate to situation, follows commands, facial expression symmetrical, purposeful motor response noted, normal sensation in all extremities when touched with a finger.  CARDIOVASCULAR: Patient has a normal rate and regular rhythm, NSR on cardiac monitor. Pulses intact. No peripheral edema noted, capillary refill < 3 seconds.   RESPIRATORY: Airway is open, respirations are spontaneous, patient has a normal effort and rate, no accessory muscle use noted.   GASTROINTESTINAL: Abdomen is soft and non-tender to palpation, no distention noted.   GENITOURINARY: Patient voids spontaneously without difficulty. Patient is continent.   MUSCULOSKELETAL: Patient moving all extremities spontaneously, no obvious swelling or deformities noted. Generalized weakness reported.  INTEGUMENTARY: The skin is warm and dry, color consistent with ethnicity, patient has normal skin turgor and moist mucus membranes. Skin is intact, no breakdown or bruising noted.    PSYCHOSOCIAL: Calm, pleasant, and cooperative. Family present at bedside.

## 2018-09-01 NOTE — H&P
"[facility] Hospital Medicine  History & Physical    Patient Name: Vel Ordonez  MRN: [id number]  Admission Date: 9/1/2018  Attending Physician: Diana Bui MD   Primary Care Provider: [facility] Medicine Team: [facility] MED FAIZAN Melvin MD     Patient information was obtained from patient, past medical records and ER records.     Subjective:     Principal Problem: Recurrent episode of syncope    Chief Complaint:   Chief Complaint   Patient presents with    Loss of Consciousness     had 30d event monitor, told to come to er        HPI: Mr. Ordonez is a 31yo man with a past medical history of bipolar 1 and hypertension.  He has an interesting recent cardiac history.  He was recently admitted to [facility] on 7/17-1/18 after having 3 days of chest pain.  At that time he had a normal EKG, though all three measured troponins were elevated, the highest at 0.266 over night.  Cardiology took him for left heart cath on 7/18/18, which revealed clean coronary arteries (see report below).  He was placed on ASA, beta-blocker and statin.  He was subsequently readmitted on 7/20/18-7/212/18 with chest pain, syncope and complaints, "of having brief periods of unresponsiveness and stiffness for the past 2 weeks since starting depakote for bipolar disorder."  Neurology weren't sure if these represented seizures or not, so set him up to see [facility] at discharge.    After being discharged, he was set up with a 30 day cardiac event monitor on 8/10/18 by the arrhythmia clinic.  On 8/27/18, per telephone records, he, "sent in a patient activated alert which is noted as Sinus Aleks/ Sinus Arrhythmia with the rates of 56-59 beats per minute. Monitoring company contacted patient. Patient symptoms noted as passing out. Patient stated that he fell on the bed when passed out , No injuries. 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  He called his wife to tell her that he was not feeling well (she took a picture of the sweat on his brow).  At this same time the heart monitor company called her and stated that he was having an abnormality to his rhythm and asked if he had passed out.  Around this same instance, he lost consciousness and slumped over his cart for an unknown period of time.  During the event he did not fall to the floor, strike his head or have bladder or bowel incontinence.  He also denies any chest pain or shortness of breath around that same time.  Currently he, "just feels a little weak and flushed." The wife states that right after talking to the heart monitor attendant, she got a call from her 's coworkers from his phone to come get him due to losing consciousness.  She called his cardiologist then came to the ED as recommended.       In the ED, the PA was able to contact the heart monitor company, which identified the patient as having gone into a period of SVT.  She notes, "Update: I've contacted the monitoring company nurse, who reports the EKG read (during episode) run of SVT up to 217 with conversion into sinus tach up to 130."    Past Medical History:   Diagnosis Date    Bipolar 1 disorder 2011    Hypertension        No past surgical history on file.    Review of patient's allergies indicates:  No Known Allergies    No current facility-administered medications on file prior to encounter.      Current Outpatient Medications on File Prior to Encounter   Medication Sig    aspirin (ECOTRIN) 81 MG EC tablet Take 1 tablet (81 mg total) by mouth once daily.    atorvastatin (LIPITOR) 80 MG tablet Take 1 tablet (80 mg total) by mouth once daily.    carvedilol (COREG) 6.25 MG tablet Take 0.5 tablets (3.125 mg total) by mouth 2 (two) times daily.    divalproex (DEPAKOTE) 250 MG EC tablet Take 500 mg by mouth 3 (three) times daily. 250mg daily  500mg afternoon and 500mg nightly "    ondansetron (ZOFRAN-ODT) 4 MG TbDL Take 1 tablet (4 mg total) by mouth every 8 (eight) hours as needed (nausea).    pantoprazole (PROTONIX) 40 MG tablet Take 1 tablet (40 mg total) by mouth once daily.    QUEtiapine (SEROQUEL) 100 MG Tab Take by mouth every evening.    oxyCODONE-acetaminophen (PERCOCET) 5-325 mg per tablet Take 1 tablet by mouth every 6 (six) hours as needed for Pain.    valACYclovir (VALTREX) 1000 MG tablet Take 1 tablet (1,000 mg total) by mouth 3 (three) times daily. for 7 days     Family History     Problem Relation (Age of Onset)    Cancer Mother    Diabetes Father    Heart disease Father        Tobacco Use    Smoking status: Never Smoker    Smokeless tobacco: Never Used   Substance and Sexual Activity    Alcohol use: No    Drug use: No    Sexual activity: Yes     Partners: Female     Review of Systems   Constitutional: Positive for diaphoresis. Negative for chills, fatigue and fever.   HENT: Negative for congestion.    Eyes: Negative for visual disturbance.   Respiratory: Negative for cough and shortness of breath.    Cardiovascular: Negative for chest pain.   Gastrointestinal: Negative for abdominal pain, constipation, diarrhea, nausea and vomiting.   Endocrine: Positive for heat intolerance.   Genitourinary: Negative for dysuria.   Musculoskeletal: Negative for arthralgias.   Skin: Positive for color change.   Allergic/Immunologic: Negative for immunocompromised state.   Neurological: Positive for dizziness, syncope and light-headedness. Negative for weakness.   Hematological: Does not bruise/bleed easily.   Psychiatric/Behavioral: Negative for confusion. The patient is not nervous/anxious.      Objective:     Vital Signs (Most Recent):  Temp: 97.6 °F (36.4 °C) (09/01/18 1710)  Pulse: 84 (09/01/18 1903)  Resp: 14 (09/01/18 1903)  BP: (!) 143/84 (09/01/18 1903)  SpO2: 98 % (09/01/18 1903) Vital Signs (24h Range):  Temp:  [97.6 °F (36.4 °C)-98.4 °F (36.9 °C)] 97.6 °F (36.4  °C)  Pulse:  [83-96] 84  Resp:  [14-20] 14  SpO2:  [96 %-99 %] 98 %  BP: (116-146)/(61-99) 143/84     Weight: 124.3 kg (274 lb)  Body mass index is 34.25 kg/m².    Physical Exam   Constitutional: He is oriented to person, place, and time. He appears well-developed and well-nourished.  Non-toxic appearance. He does not have a sickly appearance. He does not appear ill. No distress.   HENT:   Head: Normocephalic and atraumatic.   Nose: Nose normal.   Mouth/Throat: Oropharynx is clear and moist. Abnormal dentition. No oropharyngeal exudate.   Eyes: Conjunctivae and EOM are normal. Pupils are equal, round, and reactive to light. Right eye exhibits no discharge. Left eye exhibits no discharge. No scleral icterus.   Neck: Normal range of motion. Neck supple. No JVD present. Carotid bruit is not present. No tracheal deviation present. No thyromegaly present.   Cardiovascular: Normal rate, regular rhythm, normal heart sounds and intact distal pulses. Exam reveals no gallop and no friction rub.   No murmur heard.  Pulmonary/Chest: Effort normal. No accessory muscle usage or stridor. No tachypnea and no bradypnea. No respiratory distress. He has no decreased breath sounds. He has no wheezes. He has no rhonchi. He has rales in the right lower field and the left lower field. He exhibits no tenderness.   Abdominal: Soft. Bowel sounds are normal. He exhibits no distension and no mass. There is no tenderness. There is no rebound and no guarding.   Genitourinary:   Genitourinary Comments: No amato in place   Musculoskeletal: Normal range of motion. He exhibits no edema or tenderness.   Lymphadenopathy:     He has no cervical adenopathy.   No peripheral edema   Neurological: He is alert and oriented to person, place, and time. He displays normal reflexes. No cranial nerve deficit. He exhibits normal muscle tone. Coordination normal. GCS eye subscore is 4. GCS verbal subscore is 5. GCS motor subscore is 6.   Skin: Skin is warm and  dry. No rash noted. No erythema. No pallor.   Diffusely flushed to trunk. Resolving Zoster to right upper back and side.  Left shoulder tattoo.   Psychiatric: He has a normal mood and affect. His speech is normal and behavior is normal. Judgment and thought content normal. Cognition and memory are normal.   Nursing note and vitals reviewed.        CRANIAL NERVES     CN III, IV, VI   Pupils are equal, round, and reactive to light.  Extraocular motions are normal.       Significant Labs:   Recent Results (from the past 24 hour(s))   Drug screen panel, emergency    Collection Time: 09/01/18  2:50 PM   Result Value Ref Range    Benzodiazepines Negative     Methadone metabolites Negative     Cocaine (Metab.) Negative     Opiate Scrn, Ur Negative     Barbiturate Screen, Ur Negative     Amphetamine Screen, Ur Negative     THC Negative     Phencyclidine Negative     Creatinine, Random Ur 197.0 23.0 - 375.0 mg/dL    Toxicology Information SEE COMMENT    Troponin I    Collection Time: 09/01/18  4:43 PM   Result Value Ref Range    Troponin I <0.006 0.000 - 0.026 ng/mL   CBC auto differential    Collection Time: 09/01/18  4:43 PM   Result Value Ref Range    WBC 14.40 (H) 3.90 - 12.70 K/uL    RBC 4.95 4.60 - 6.20 M/uL    Hemoglobin 15.3 14.0 - 18.0 g/dL    Hematocrit 45.9 40.0 - 54.0 %    MCV 93 82 - 98 fL    MCH 30.9 27.0 - 31.0 pg    MCHC 33.3 32.0 - 36.0 g/dL    RDW 14.0 11.5 - 14.5 %    Platelets 339 150 - 350 K/uL    MPV 10.7 9.2 - 12.9 fL    Immature Granulocytes 0.4 0.0 - 0.5 %    Gran # (ANC) 9.4 (H) 1.8 - 7.7 K/uL    Immature Grans (Abs) 0.06 (H) 0.00 - 0.04 K/uL    Lymph # 3.0 1.0 - 4.8 K/uL    Mono # 1.6 (H) 0.3 - 1.0 K/uL    Eos # 0.3 0.0 - 0.5 K/uL    Baso # 0.08 0.00 - 0.20 K/uL    nRBC 0 0 /100 WBC    Gran% 65.5 38.0 - 73.0 %    Lymph% 20.7 18.0 - 48.0 %    Mono% 10.8 4.0 - 15.0 %    Eosinophil% 2.0 0.0 - 8.0 %    Basophil% 0.6 0.0 - 1.9 %    Differential Method Automated    Comprehensive metabolic panel     Collection Time: 09/01/18  4:43 PM   Result Value Ref Range    Sodium 143 136 - 145 mmol/L    Potassium 3.9 3.5 - 5.1 mmol/L    Chloride 109 95 - 110 mmol/L    CO2 24 23 - 29 mmol/L    Glucose 109 70 - 110 mg/dL    BUN, Bld 19 6 - 20 mg/dL    Creatinine 1.0 0.5 - 1.4 mg/dL    Calcium 9.3 8.7 - 10.5 mg/dL    Total Protein 7.5 6.0 - 8.4 g/dL    Albumin 3.9 3.5 - 5.2 g/dL    Total Bilirubin 0.4 0.1 - 1.0 mg/dL    Alkaline Phosphatase 79 55 - 135 U/L    AST 30 10 - 40 U/L    ALT 63 (H) 10 - 44 U/L    Anion Gap 10 8 - 16 mmol/L    eGFR if African American >60.0 >60 mL/min/1.73 m^2    eGFR if non African American >60.0 >60 mL/min/1.73 m^2   Magnesium    Collection Time: 09/01/18  4:43 PM   Result Value Ref Range    Magnesium 2.2 1.6 - 2.6 mg/dL   Urinalysis, Reflex to Urine Culture Urine, Clean Catch    Collection Time: 09/01/18  4:50 PM   Result Value Ref Range    Specimen UA Urine, Clean Catch     Color, UA Yellow Yellow, Straw, Alesia    Appearance, UA Clear Clear    pH, UA 6.0 5.0 - 8.0    Specific Gravity, UA 1.030 1.005 - 1.030    Protein, UA Negative Negative    Glucose, UA Negative Negative    Ketones, UA Negative Negative    Bilirubin (UA) Negative Negative    Occult Blood UA Negative Negative    Nitrite, UA Negative Negative    Urobilinogen, UA 4.0-6.0 (A) <2.0 EU/dL    Leukocytes, UA Negative Negative       Significant Imaging:   XR CHEST PA AND LATERAL    CLINICAL HISTORY:  Unspecified coma    TECHNIQUE:  PA and lateral views of the chest were performed.    COMPARISON:  Chest radiograph 07/20/2018    FINDINGS:  No detrimental change.The lungs are clear, with normal appearance of pulmonary vasculature and no pleural effusion or pneumothorax.    The cardiac silhouette is normal in size. The hilar and mediastinal contours are unremarkable.    Osseous structures appear stable without acute process seen.  Resolution is somewhat limited by body habitus with underpenetration.      Impression       No detrimental  change or radiographic acute intrathoracic process seen.      Electronically signed by: Casa Vance MD  Date: 09/01/2018       2D ECHO CFD 7/18/18    1 - Normal left ventricular systolic function (EF 55-60%).     2 - No wall motion abnormalities.     3 - Concentric hypertrophy.     4 - Upper limit of normal left ventricular enlargement.     5 - Low normal right ventricular systolic function .     6 - Trivial mitral regurgitation.     7 - Trivial tricuspid regurgitation.     8 - The estimated PA systolic pressure is 29 mmHg.   This document has been electronically    SIGNED BY: Asher Lozano MD On: 07/18/2018 12:30    Left Heart cath 7/18/18:  E. Angiographic Results  Patient has a left dominant coronary artery.      - Left Main Coronary Artery:             The ostial LM is normal. There is LOUANN 3 flow.     - Left Anterior Descending Artery:             The LAD is normal. There is LOUANN 3 flow.     - Ramus:             The ramus is normal. There is LOUANN 3 flow.     - Left Circumflex Artery:             The LCX is normal. There is LOUANN 3 flow.     - Right Coronary Artery:             The RCA is normal. There is LOUANN 3 flow    01-SEP-2018 16:23:44 EKG    Normal sinus rhythm  Normal ECG  When compared with ECG of 20-JUL-2018 21:40,  Nonspecific T wave abnormality no longer evident in Anterior-lateral leads  Vent. rate 91 BPM  LA interval 146 ms  QRS duration 104 ms  QT/QTc 332/408 ms    Assessment/Plan:     Supraventricular tachycardia  Cardiogenic syncope  -Place on telemetry  -Consult EP in am  -Consider changing Coreg to Lopressor (defer to EP)  -Keep Mg>2, K>4    Recent NSTEMI  -C revealed clean coronaries  -aspirin EC tablet 81 mg, 81 mg, Oral, Daily  -carvedilol tablet 3.125 mg, 3.125 mg, Oral, BID    Essential hypertension  -carvedilol tablet 3.125 mg, 3.125 mg, Oral, BID    Bipolar I disorder  -divalproex EC tablet 500 mg, 500 mg, Oral, after lunch and at night  -divalproex EC tablet 250 mg, 250 mg,  "Oral, every morning  -QUEtiapine tablet 100 mg, 100 mg, Oral, QHS   -QT/QTc 332/408 ms (normal QTc values are generally considered to be between 350 and 440 ms")    Hyperlipidemia  -atorvastatin tablet 80 mg, 80 mg, Oral, Daily       VTE Risk Mitigation (From admission, onward)        Ordered     enoxaparin injection 40 mg  Daily      09/01/18 1928     Place SAMMIE hose  Until discontinued      09/01/18 1928     Place sequential compression device  Until discontinued      09/01/18 1928     IP VTE HIGH RISK PATIENT  Once      09/01/18 1928            MICHAEL Melvin MD  Department of Hospital Medicine   [facility]  "

## 2018-09-01 NOTE — ED PROVIDER NOTES
"Encounter Date: 9/1/2018       History     Chief Complaint   Patient presents with    Loss of Consciousness     had 30d event monitor, told to come to er     Mr Ordonez is a 32YOWM who presents for syncopal/seizure(?) episode earlier this afternoon; pertinent PMHx BP 1, HTN. Patient is currently undergoing w/u for syncopal/possible seizure episodes. In short, patient began experiencing multiple syncopal episodes in July. These episodes have been described by wife as patient becoming stiff and "shaking all over", followed by lethargy. In the same few day span, patient began experiencing substernal chest pain. He was found to have an NSTEMI with negative angiogram. Currently, he has a 30d cardiac monitor. In addition, neurology has seen the patient in the hospital and recommends outpatient EEG to further qualify these possible seizure episodes. He has not yet been able to arrange EEG, as they are having scheduling difficulties with primary provider. Last syncopal episode was approximately 2 weeks ago. Of note, he recently started taking Depakote for BP, though these episodes began prior to this initiation.      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 After these episodes, patient feels "tired" for several minutes. Around the same time, the wife endorses getting a phone call from the monitoring company for alarming activity on his monitor. The company then called the on-call physician at his cardiologist's office, who urged him to go to the ED.  Currently, patient is asymptomatic and has no complaints. He reports feeling "well" over the past few days and denies fever, chills, cough, continuing CP, SOB, abdominal pain, dysuria. He denies SI/HI and states his mood has been improving on Depakote.      Update: I've contacted the monitoring company nurse, who reports the EKG read (during episode) run of SVT up to 217 with conversion into " "sinus tach up to 130.          Review of patient's allergies indicates:  No Known Allergies  Past Medical History:   Diagnosis Date    Bipolar 1 disorder 2011    Hypertension      No past surgical history on file.  Family History   Problem Relation Age of Onset    Cancer Mother     Heart disease Father     Diabetes Father      Social History     Tobacco Use    Smoking status: Never Smoker    Smokeless tobacco: Never Used   Substance Use Topics    Alcohol use: No    Drug use: No     Review of Systems   Constitutional: Negative for chills, diaphoresis, fatigue and fever.        Feels "flushed" prior to syncopal episode   HENT: Negative for sore throat, trouble swallowing and voice change.    Respiratory: Negative for cough, chest tightness, shortness of breath and wheezing.    Cardiovascular: Negative for chest pain and palpitations.   Gastrointestinal: Negative for abdominal pain, nausea and vomiting.   Genitourinary: Negative for dysuria, flank pain, frequency and hematuria.   Musculoskeletal: Negative for arthralgias, back pain, myalgias and neck pain.   Skin: Negative for pallor, rash and wound.   Allergic/Immunologic: Negative for immunocompromised state.   Neurological: Positive for dizziness (prior to syncopal episode) and syncope. Seizures: possible seizure activity.   Psychiatric/Behavioral: Negative for agitation and confusion.       Physical Exam     Initial Vitals [09/01/18 1612]   BP Pulse Resp Temp SpO2   (!) 146/99 83 18 98.4 °F (36.9 °C) 97 %      MAP       --         Physical Exam    Vitals reviewed.  Constitutional: He appears well-developed and well-nourished. He is not diaphoretic. No distress.   Well-appearing male in NAD, VSS, afebrile, 98% on RA.     HENT:   Head: Normocephalic and atraumatic.   Right Ear: External ear normal.   Left Ear: External ear normal.   Nose: Nose normal.   Mouth/Throat: Oropharynx is clear and moist. No oropharyngeal exudate.   Eyes: Conjunctivae are normal. " Pupils are equal, round, and reactive to light.   Neck: Normal range of motion. Neck supple. No thyromegaly present.   Cardiovascular: Normal rate, regular rhythm, normal heart sounds and intact distal pulses.   Pulmonary/Chest: Breath sounds normal. No respiratory distress. He has no wheezes.   Abdominal: Soft. Bowel sounds are normal. There is no tenderness.   Musculoskeletal: Normal range of motion. He exhibits no edema or tenderness.   Lymphadenopathy:     He has no cervical adenopathy.   Neurological: He is alert and oriented to person, place, and time. He has normal strength. No cranial nerve deficit or sensory deficit. GCS score is 15. GCS eye subscore is 4. GCS verbal subscore is 5. GCS motor subscore is 6.   Detailed Neurologic exam:  Mental Status:  Normal attention and reasoning. There is no evidence of a thought disorder. Affect and mood were unremarkable. Speech was normal and prosody was intact. Verbal expression and comprehension are intact. Immediate recall, recent and remote memory were intact.    Cranial Nerves:  PERRLA. Ocular motility was full and there was no nystagmus evident. Strength of masticatory muscles was normal. Facial sensation was normal. No facial weakness. Hearing acuity was normal. Palatal movements and gag reflex were intact. Sternocleidomastoid and trapezii strength were normal. Tongue protruded in the midline and showed no atrophy, tremor or fasciculations.    Motor Examination (bulk, tone, strength):  Motor examination showed normal muscle bulk, tone, and strength throughout. Rapid alternating movements were normal. There were no adventitious movements noted.    Sensory:  Normal light-toucha and position sense.    Cerebellar and coordination:  Coordination examination showed normal rapid alternating movements. Finger-finger, finger-nose, and heel-knee-shin testing were unremarkable.    Romberg test:  Romberg was negative.    Gait and Station:  Erect posture was normal. There  was no postural instability. There was no festination. Tandem, heel, and toe walking were unremarkable.    Spine:  Normal range of motion. No tenderness on palpation. SLR negative.     Skin: Skin is warm and dry. Capillary refill takes less than 2 seconds. No rash noted. No erythema. No pallor.   Psychiatric: He has a normal mood and affect. His behavior is normal. Judgment and thought content normal.         ED Course   Procedures  Labs Reviewed   CBC W/ AUTO DIFFERENTIAL - Abnormal; Notable for the following components:       Result Value    WBC 14.40 (*)     Gran # (ANC) 9.4 (*)     Immature Grans (Abs) 0.06 (*)     Mono # 1.6 (*)     All other components within normal limits   TROPONIN I   COMPREHENSIVE METABOLIC PANEL   DRUG SCREEN PANEL, URINE EMERGENCY   URINALYSIS, REFLEX TO URINE CULTURE   MAGNESIUM          Imaging Results    None          Medical Decision Making:   Initial Assessment:   Patient currently under workup for frequent syncopal/seizure episodes presents with LOC PTA. Cardiac monitor at time read run of SVT up to 217 with conversion into sinus tach up to 130. Asymptomatic on arrival with no complaints.  Differential Diagnosis:   Physical exam and history taking lower clinical suspicion for ACS, PE, acute abdomen, CVA, bacteremia, cardiovascular shock.   ED Management:  WBC remains minimally elevated with no current infectious source (prior elevations were thought to be d/t frequent vomiting episodes). Otherwise, labs are largely unremarkable. Trop negative. Plain films of chest negative. EKG shows NSR. Cardiology consulted and has seen both faxed telemetry report and visited patient at bedside. They recommend observation admission for syncopal episode with electrophysiology consult in hospital. Possible etiology of syncope may be SVT-induced hypotension.   I discussed this plan with patient, Patient agreed to plan of care and voiced understanding.     Rivka Gómez PA-C  09/01/2018    I  discussed the following case, diagnosis and plan of care with attending physician.                Attending Attestation:     Physician Attestation Statement for NP/PA:   I have conducted a face to face encounter with this patient in addition to the NP/PA, due to Medical Complexity    Other NP/PA Attestation Additions:      Medical Decision Making: Syncopal episode with SVT on event monitor.  I reviewed the EKG, and the patient is currently in sinus rhythm.  The patient will be evaluated by Cardiology.                    Clinical Impression:   The primary encounter diagnosis was Syncope and collapse. Diagnoses of LOC (loss of consciousness) and History of non-ST elevation myocardial infarction (NSTEMI) were also pertinent to this visit.      Disposition:   Disposition: Placed in Observation  Condition: Stable                        Rivka Gómez PA-C  09/01/18 [id number]

## 2018-09-01 NOTE — HPI
"Mr Ordonez is a 32YOWM who presents for syncopal episode earlier this afternoon which correlated with SVT on his event monitor. 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 He stayed out for 5-10 seconds and recovery consciousness afterwards. Denied any prodrome symptoms. He woke up alone, slumped over the [employer]. After these episodes, patient feels "tired" for several minutes. He was called by the monitoring company for alarming activity on his monitor. T  His past medical history is significant for HTN and BP 1 disorder. Patient is currently undergoing w/u for syncopal/possible seizure episodes. In short, patient began experiencing multiple syncopal episodes in July. These episodes have been described by wife as patient becoming stiff and "shaking all over", followed by lethargy.   In the same few day span, patient began experiencing substernal chest pain. He was found to have an NSTEMI with negative angiogram. Currently, he has a 30d cardiac monitor. In addition, neurology has seen the patient in the hospital and recommends outpatient EEG to further qualify these possible seizure episodes.  Neurology saw him in the ED and recommended EEG work up as outpatient.     Currently, patient is asymptomatic and has no complaints. He denies fever, chills, cough, continuing CP, SOB, abdominal pain, dysuria. He denies SI/HI and states his mood has been improving on Depakote.      EKG showed NSR with iRBB and normal QTc.       "

## 2018-09-01 NOTE — SUBJECTIVE & OBJECTIVE
"Interval History: Mr Ordonez is a 32YOWM who presents for syncopal episode earlier this afternoon which correlated with SVT on his event monitor. 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 He stayed out for 5-10 seconds and recovery consciousness afterwards. Denied any prodrome symptoms. He woke up alone, slumped over the forklift chair. After these episodes, patient feels "tired" for several minutes. He was called by the monitoring company for alarming activity on his monitor. T  His past medical history is significant for HTN and BP 1 disorder. Patient is currently undergoing w/u for syncopal/possible seizure episodes. In short, patient began experiencing multiple syncopal episodes in July. These episodes have been described by wife as patient becoming stiff and "shaking all over", followed by lethargy.   In the same few day span, patient began experiencing substernal chest pain. He was found to have an NSTEMI with negative angiogram. Currently, he has a 30d cardiac monitor. In addition, neurology has seen the patient in the hospital and recommends outpatient EEG to further qualify these possible seizure episodes.  Neurology saw him in the ED and recommended EEG work up as outpatient.     Currently, patient is asymptomatic and has no complaints. He denies fever, chills, cough, continuing CP, SOB, abdominal pain, dysuria. He denies SI/HI and states his mood has been improving on Depakote.      EKG showed NSR with iRBB and normal QTc.     Objective:     Vital Signs (Most Recent):  Temp: 97.6 °F (36.4 °C) (09/01/18 1710)  Pulse: 89 (09/01/18 1802)  Resp: 20 (09/01/18 1802)  BP: 116/72 (09/01/18 1802)  SpO2: 99 % (09/01/18 1802) Vital Signs (24h Range):  Temp:  [97.6 °F (36.4 °C)-98.4 °F (36.9 °C)] 97.6 °F (36.4 °C)  Pulse:  [83-96] 89  Resp:  [18-20] 20  SpO2:  [96 %-99 %] 99 %  BP: (116-146)/(61-99) 116/72     Weight: 124.3 kg (274 lb)  Body mass index is 34.25 " kg/m².    SpO2: 99 %       No intake or output data in the 24 hours ending 09/01/18 1855    Lines/Drains/Airways     Peripheral Intravenous Line                 Peripheral IV - Single Lumen 09/01/18 1629 Left Wrist less than 1 day                Physical Exam   Constitutional: He is oriented to person, place, and time. He appears well-developed and well-nourished.   HENT:   Head: Normocephalic and atraumatic.   Eyes: Conjunctivae are normal.   Neck: Normal range of motion. Neck supple. No JVD present. No thyromegaly present.   Cardiovascular: Normal rate and regular rhythm. Exam reveals gallop. Exam reveals no friction rub.   No murmur heard.  Pulmonary/Chest: Effort normal and breath sounds normal. No respiratory distress. He has no wheezes.   Abdominal: Soft. Bowel sounds are normal. He exhibits no distension. There is no tenderness. There is no rebound.   Musculoskeletal: Normal range of motion.   Neurological: He is oriented to person, place, and time.   Skin: Skin is warm.   Nursing note and vitals reviewed.      Significant Labs:   BMP:   Recent Labs   Lab  09/01/18   1643   GLU  109   NA  143   K  3.9   CL  109   CO2  24   BUN  19   CREATININE  1.0   CALCIUM  9.3   MG  2.2   , CMP   Recent Labs   Lab  09/01/18   1643   NA  143   K  3.9   CL  109   CO2  24   GLU  109   BUN  19   CREATININE  1.0   CALCIUM  9.3   PROT  7.5   ALBUMIN  3.9   BILITOT  0.4   ALKPHOS  79   AST  30   ALT  63*   ANIONGAP  10   ESTGFRAFRICA  >60.0   EGFRNONAA  >60.0   , Troponin   Recent Labs   Lab  09/01/18   1643   TROPONINI  <0.006    and All pertinent lab results from the last 24 hours have been reviewed.    Significant Imaging: Echocardiogram:   2D echo with color flow doppler:   Results for orders placed or performed during the hospital encounter of 07/17/18   2D echo with color flow doppler   Result Value Ref Range    EF 55 55 - 65    Mitral Valve Regurgitation TRIVIAL     Est. PA Systolic Pressure 28.79     Pericardial Effusion  NONE     Mitral Valve Mobility NORMAL     Tricuspid Valve Regurgitation TRIVIAL

## 2018-09-02 VITALS
HEIGHT: 75 IN | OXYGEN SATURATION: 99 % | HEART RATE: 64 BPM | WEIGHT: 274.06 LBS | TEMPERATURE: 98 F | DIASTOLIC BLOOD PRESSURE: 71 MMHG | RESPIRATION RATE: 17 BRPM | BODY MASS INDEX: 34.08 KG/M2 | SYSTOLIC BLOOD PRESSURE: 117 MMHG

## 2018-09-02 PROBLEM — R55 SYNCOPE, CARDIOGENIC: Status: RESOLVED | Noted: 2018-09-01 | Resolved: 2018-09-02

## 2018-09-02 LAB
ANION GAP SERPL CALC-SCNC: 11 MMOL/L
BASOPHILS # BLD AUTO: 0.07 K/UL
BASOPHILS NFR BLD: 0.7 %
BUN SERPL-MCNC: 20 MG/DL
CALCIUM SERPL-MCNC: 9.2 MG/DL
CHLORIDE SERPL-SCNC: 107 MMOL/L
CO2 SERPL-SCNC: 26 MMOL/L
CREAT SERPL-MCNC: 0.8 MG/DL
DIASTOLIC DYSFUNCTION: NO
DIFFERENTIAL METHOD: ABNORMAL
EOSINOPHIL # BLD AUTO: 0.4 K/UL
EOSINOPHIL NFR BLD: 3.7 %
ERYTHROCYTE [DISTWIDTH] IN BLOOD BY AUTOMATED COUNT: 14.3 %
EST. GFR  (AFRICAN AMERICAN): >60 ML/MIN/1.73 M^2
EST. GFR  (NON AFRICAN AMERICAN): >60 ML/MIN/1.73 M^2
ESTIMATED AVG GLUCOSE: 105 MG/DL
ESTIMATED PA SYSTOLIC PRESSURE: 16.99
GLUCOSE SERPL-MCNC: 91 MG/DL
HBA1C MFR BLD HPLC: 5.3 %
HCT VFR BLD AUTO: 45.9 %
HGB BLD-MCNC: 14.6 G/DL
IMM GRANULOCYTES # BLD AUTO: 0.02 K/UL
IMM GRANULOCYTES NFR BLD AUTO: 0.2 %
LYMPHOCYTES # BLD AUTO: 3 K/UL
LYMPHOCYTES NFR BLD: 30.4 %
MAGNESIUM SERPL-MCNC: 2.4 MG/DL
MCH RBC QN AUTO: 30 PG
MCHC RBC AUTO-ENTMCNC: 31.8 G/DL
MCV RBC AUTO: 94 FL
MITRAL VALVE REGURGITATION: NORMAL
MONOCYTES # BLD AUTO: 1.4 K/UL
MONOCYTES NFR BLD: 14.3 %
NEUTROPHILS # BLD AUTO: 5 K/UL
NEUTROPHILS NFR BLD: 50.7 %
NRBC BLD-RTO: 0 /100 WBC
PHOSPHATE SERPL-MCNC: 4.6 MG/DL
PLATELET # BLD AUTO: 293 K/UL
PMV BLD AUTO: 10.2 FL
POTASSIUM SERPL-SCNC: 3.7 MMOL/L
RBC # BLD AUTO: 4.86 M/UL
RETIRED EF AND QEF - SEE NOTES: 55 (ref 55–65)
SODIUM SERPL-SCNC: 144 MMOL/L
TRICUSPID VALVE REGURGITATION: NORMAL
TSH SERPL DL<=0.005 MIU/L-ACNC: 2 UIU/ML
WBC # BLD AUTO: 9.92 K/UL

## 2018-09-02 PROCEDURE — 85025 COMPLETE CBC W/AUTO DIFF WBC: CPT

## 2018-09-02 PROCEDURE — 93306 TTE W/DOPPLER COMPLETE: CPT

## 2018-09-02 PROCEDURE — 25000003 PHARM REV CODE 250: Performed by: INTERNAL MEDICINE

## 2018-09-02 PROCEDURE — 36415 COLL VENOUS BLD VENIPUNCTURE: CPT

## 2018-09-02 PROCEDURE — 83036 HEMOGLOBIN GLYCOSYLATED A1C: CPT

## 2018-09-02 PROCEDURE — 99214 OFFICE O/P EST MOD 30 MIN: CPT | Mod: ,,, | Performed by: INTERNAL MEDICINE

## 2018-09-02 PROCEDURE — 83735 ASSAY OF MAGNESIUM: CPT

## 2018-09-02 PROCEDURE — G0378 HOSPITAL OBSERVATION PER HR: HCPCS

## 2018-09-02 PROCEDURE — 99225 PR SUBSEQUENT OBSERVATION CARE,LEVEL II: CPT | Mod: ,,, | Performed by: NURSE PRACTITIONER

## 2018-09-02 PROCEDURE — 25000003 PHARM REV CODE 250: Performed by: NURSE PRACTITIONER

## 2018-09-02 PROCEDURE — 80048 BASIC METABOLIC PNL TOTAL CA: CPT

## 2018-09-02 PROCEDURE — 93306 TTE W/DOPPLER COMPLETE: CPT | Mod: 26,,, | Performed by: INTERNAL MEDICINE

## 2018-09-02 PROCEDURE — 84443 ASSAY THYROID STIM HORMONE: CPT

## 2018-09-02 PROCEDURE — 84100 ASSAY OF PHOSPHORUS: CPT

## 2018-09-02 RX ORDER — POTASSIUM CHLORIDE 750 MG/1
30 CAPSULE, EXTENDED RELEASE ORAL ONCE
Status: COMPLETED | OUTPATIENT
Start: 2018-09-02 | End: 2018-09-02

## 2018-09-02 RX ORDER — METOPROLOL SUCCINATE 25 MG/1
50 TABLET, EXTENDED RELEASE ORAL DAILY
Qty: 30 TABLET | Refills: 5 | Status: SHIPPED | OUTPATIENT
Start: 2018-09-02 | End: 2018-09-06 | Stop reason: SDUPTHER

## 2018-09-02 RX ORDER — METOPROLOL TARTRATE 25 MG/1
25 TABLET, FILM COATED ORAL 2 TIMES DAILY
Status: DISCONTINUED | OUTPATIENT
Start: 2018-09-02 | End: 2018-09-02 | Stop reason: HOSPADM

## 2018-09-02 RX ADMIN — METOPROLOL TARTRATE 25 MG: 25 TABLET ORAL at 09:09

## 2018-09-02 RX ADMIN — DIVALPROEX SODIUM 250 MG: 250 TABLET, DELAYED RELEASE ORAL at 09:09

## 2018-09-02 RX ADMIN — PANTOPRAZOLE SODIUM 40 MG: 40 TABLET, DELAYED RELEASE ORAL at 09:09

## 2018-09-02 RX ADMIN — ASPIRIN 81 MG: 81 TABLET, COATED ORAL at 09:09

## 2018-09-02 RX ADMIN — ATORVASTATIN CALCIUM 80 MG: 20 TABLET, FILM COATED ORAL at 09:09

## 2018-09-02 RX ADMIN — POTASSIUM CHLORIDE 30 MEQ: 750 CAPSULE, EXTENDED RELEASE ORAL at 09:09

## 2018-09-02 NOTE — HOSPITAL COURSE
Mr. Ordonez was placed in observation 09/01 due to cardiogenic syncope likely due to SVT. Since arrival no further symptoms and he as remained in NSR, transitioned to metoprolol for beta blockade. Home ASA, statin, depakote, protonix, and seroquel continued. No neuro deficits or seizure like activity noted. 2D echo in July with normal EF, no WMAs, concentric hypertrophy, trivial MR, trivial, TR, and RAP I. EP consulted, recommend toprol XL 50 mg daily, syncope precautions, no driving or operating heavy machinery; and follow up with [facility] or [facility] after completion of 30 day event monitor to discuss treatment options.    Disposition: home with spouse, [facility] or [facility] after completion of 30 day event monitor to discuss treatment options; numbers provided. Reinforced no driving or operating heavy machinery. Encouraged to keep [facility] follow up.

## 2018-09-02 NOTE — HPI
"Mr Ordonez is a 33 yo M who presents for syncopal episode yesterday afternoon which correlated with SVT on his event monitor. 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 He stayed out for 5-10 seconds and recovery consciousness afterwards. Denied any prodrome symptoms. He woke up alone, slumped over the [employer] chair. After these episodes, patient feels "tired" for several minutes. He was called by the monitoring company for alarming activity on his monitor. T  His past medical history is significant for HTN and BP 1 disorder. Patient is currently undergoing w/u for syncopal/possible seizure episodes. In short, patient began experiencing multiple syncopal episodes in July. These episodes have been described by wife as patient becoming stiff and "shaking all over", followed by lethargy.   In the same few day span, patient began experiencing substernal chest pain. He was found to have an NSTEMI with negative angiogram. Currently, he has a 30d cardiac monitor. In addition, neurology has seen the patient in the hospital and recommends outpatient EEG to further qualify these possible seizure episodes.  Neurology saw him in the ED and recommended EEG work up as outpatient.      Currently, patient is asymptomatic and has no complaints. He denies fever, chills, cough, continuing CP, SOB, abdominal pain, dysuria. He denies SI/HI and states his mood has been improving on Depakote.       EKG showed NSR with iRBB and normal QTc.          "

## 2018-09-02 NOTE — ASSESSMENT & PLAN NOTE
-Patient had loss of consciousness during SVT with  bpm's. Patient reports palpitations right before the episode. Syncope only lasted for 5-10 seconds. EKG at baseline showed NSR, iRRB and QTc 410 msecs  -Admit to [facility] for telemetry monitoring  -EP consult in the AM to evaluate in-patient vs. Outpatient SVT work up given the associated syncopal event with SVT  -2D echo with CFD  -start metoprolol tartrate 25 mg po BID

## 2018-09-02 NOTE — ASSESSMENT & PLAN NOTE
31 yo M with HTN and bipolar d/o who is admitted on 9/1 due to recurrent syncope correlated with episode of SVT on his 30-day cardiac event monitor.    Review of patient's rhythm strip demonstrates narrow complex tachycardia (132-217 bpm) lasting approx ~ 40 seconds with gradual slowing before terminating back to NSR; p waves not visible during tachycardia.    DDx: AVNRT vs atrial tachycardia vs AVRT    Recommendations:  -- Preferentially initiate Toprol XL 50 mg daily over PTA carvedilol (please DC).  -- Advised patient to continue wearing his cardiac event monitor.  -- He will need close follow up with our EP clinic (Dr. Davis) at the end of his 30-day monitor to discuss ablation as definitive treatment. If patient is unable to be scheduled within reasonable time frame due to his insurance he can follow up with EP clinic at [facility] -- patient provided with name and contact information of [facility] EP provider(s).   -- Syncope precautions at all times until definitive treatment.

## 2018-09-02 NOTE — HPI
"Mr. Ordonez is a 33yo man with a past medical history of bipolar 1 and hypertension.  He has an interesting recent cardiac history.  He was recently admitted to [facility] on 7/17-1/18 after having 3 days of chest pain.  At that time he had a normal EKG, though all three measured troponins were elevated, the highest at 0.266 over night.  Cardiology took him for left heart cath on 7/18/18, which revealed clean coronary arteries (see report below).  He was placed on ASA, beta-blocker and statin.  He was subsequently readmitted on 7/20/18-7/212/18 with chest pain, syncope and complaints, "of having brief periods of unresponsiveness and stiffness for the past 2 weeks since starting depakote for bipolar disorder." Neurology weren't sure if these represented seizures or not, so set him up to see [facility] at discharge.     After being discharged, he was set up with a 30 day cardiac event monitor on 8/10/18 by the arrhythmia clinic.  On 8/27/18, per telephone records, he, "sent in a patient activated alert which is noted as Sinus Aleks/ Sinus Arrhythmia with the rates of 56-59 beats per minute. Monitoring company contacted patient. Patient symptoms noted as passing out. Patient stated that he fell on the bed when passed out , No injuries. 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  He called his wife to tell her that he was not feeling well (she took a picture of the sweat on his brow).  At this same time the heart monitor company called her and stated that he was having an abnormality to his rhythm and asked if he had passed out.  Around this same instance, he lost consciousness and slumped over the forklift seat for an unknown period of time.  During the event he did not fall to the floor, strike his head or have bladder or bowel incontinence.  He also denies any chest pain or shortness of breath around that same time.  Currently he, "just feels a little " "weak and flushed." The wife states that right after talking to the heart monitor attendant, she got a call from her 's coworkers from his phone to come get him due to losing consciousness.  She called his cardiologist then came to the ED as recommended. All past medical, social, and family history reviewed.        In the ED, the PA was able to contact the heart monitor company, which identified the patient as having gone into a period of SVT.  She notes, "Update: I've contacted the monitoring company nurse, who reports the EKG read (during episode) run of SVT up to 217 with conversion into sinus tach up to 130." Otherwise CBC, chemistry unremarkable, troponin negative, TSH WNL, CXR without acute findings, EKG with NSR, UDS/UA negative, and HgA1c 5.3/105. The patient was placed in observation to the Hospital Medicine Service for further evaluation and treatment.   "

## 2018-09-02 NOTE — PLAN OF CARE
Problem: Patient Care Overview  Goal: Plan of Care Review  Outcome: Ongoing (interventions implemented as appropriate)  Transferred from ER yesterday at 20:12, admitted on room 10, transferred via stretcher. Patient AAO, walked from stretcher to bed. Not in distress denies pain. Girlfriend at bedside. VS taken and was NAD, has telemetry on and is monitoring NSR. Patient oriented to room, safety and fall precaution discussed, SAMMIE hose and non skid socks on, call bell within reach. Mobilization encouraged, due meds given. Promoted rest and sleep.

## 2018-09-02 NOTE — SUBJECTIVE & OBJECTIVE
Interval History: Resting bed    Review of Systems   Constitutional: Negative for activity change, appetite change, chills, diaphoresis, fatigue, fever and unexpected weight change.   HENT: Negative for congestion, sinus pressure, sinus pain, sneezing, sore throat and voice change.    Respiratory: Negative for cough, chest tightness, shortness of breath and wheezing.    Cardiovascular: Negative for chest pain, palpitations and leg swelling.   Gastrointestinal: Negative for abdominal distention, abdominal pain, constipation, diarrhea, nausea and vomiting.   Genitourinary: Negative for decreased urine volume, difficulty urinating and urgency.   Musculoskeletal: Negative for arthralgias, back pain, gait problem, joint swelling, myalgias, neck pain and neck stiffness.   Skin: Negative for color change, pallor, rash and wound.   Neurological: Negative for dizziness, syncope, weakness, light-headedness and headaches.   Psychiatric/Behavioral: Negative for agitation, behavioral problems, confusion, decreased concentration, dysphoric mood, hallucinations, self-injury, sleep disturbance and suicidal ideas. The patient is not nervous/anxious and is not hyperactive.      Objective:     Vital Signs (Most Recent):  Temp: 98.4 °F (36.9 °C) (09/02/18 1152)  Pulse: 66 (09/02/18 1156)  Resp: 17 (09/02/18 1152)  BP: 117/71 (09/02/18 1152)  SpO2: 99 % (09/02/18 1152) Vital Signs (24h Range):  Temp:  [97.6 °F (36.4 °C)-98.4 °F (36.9 °C)] 98.4 °F (36.9 °C)  Pulse:  [61-96] 66  Resp:  [14-20] 17  SpO2:  [95 %-99 %] 99 %  BP: (105-146)/(55-99) 117/71     Weight: 124.3 kg (274 lb 0.5 oz)  Body mass index is 34.25 kg/m².    Intake/Output Summary (Last 24 hours) at 9/2/2018 1159  Last data filed at 9/2/2018 1100  Gross per 24 hour   Intake 240 ml   Output --   Net 240 ml      Physical Exam   Constitutional: He is oriented to person, place, and time. He appears well-developed and well-nourished. No distress.   HENT:   Head: Normocephalic and  atraumatic.   Mouth/Throat: Mucous membranes are normal. Abnormal dentition.   Eyes: Conjunctivae and lids are normal. Pupils are equal, round, and reactive to light.   Neck: Normal range of motion. Neck supple.   Cardiovascular: Normal rate, regular rhythm, normal heart sounds and intact distal pulses. Exam reveals no gallop and no friction rub.   No murmur heard.  Pulmonary/Chest: Effort normal and breath sounds normal. He exhibits no tenderness.   Abdominal: Soft. Bowel sounds are normal. He exhibits no distension. There is no tenderness.   Musculoskeletal: Normal range of motion. He exhibits no edema or deformity.   Neurological: He is alert and oriented to person, place, and time. No cranial nerve deficit.   Skin: Skin is warm and dry. Capillary refill takes less than 2 seconds. No rash noted. He is not diaphoretic. No erythema.   Psychiatric: He has a normal mood and affect. Judgment and thought content normal.   Nursing note and vitals reviewed.    Significant Labs:   A1C:   Recent Labs   Lab  07/18/18   0808  09/02/18   0626   HGBA1C  5.1  5.3     CBC:   Recent Labs   Lab  09/01/18   1643  09/02/18   0625   WBC  14.40*  9.92   HGB  15.3  14.6   HCT  45.9  45.9   PLT  339  293     CMP:   Recent Labs   Lab  09/01/18   1643  09/02/18   0625   NA  143  144   K  3.9  3.7   CL  109  107   CO2  24  26   GLU  109  91   BUN  19  20   CREATININE  1.0  0.8   CALCIUM  9.3  9.2   PROT  7.5   --    ALBUMIN  3.9   --    BILITOT  0.4   --    ALKPHOS  79   --    AST  30   --    ALT  63*   --    ANIONGAP  10  11   EGFRNONAA  >60.0  >60.0     Cardiac Markers: No results for input(s): CKMB, MYOGLOBIN, BNP, TROPISTAT in the last 48 hours.  Magnesium:   Recent Labs   Lab  09/01/18   1643  09/02/18   0625   MG  2.2  2.4     Troponin:   Recent Labs   Lab  09/01/18   1643   TROPONINI  <0.006     TSH:   Recent Labs   Lab  09/02/18   0625   TSH  2.005     Urine Studies:   Recent Labs   Lab  09/01/18   1650   COLORU  Yellow    APPEARANCEUA  Clear   PHUR  6.0   SPECGRAV  1.030   PROTEINUA  Negative   GLUCUA  Negative   KETONESU  Negative   BILIRUBINUA  Negative   OCCULTUA  Negative   NITRITE  Negative   UROBILINOGEN  4.0-6.0*   LEUKOCYTESUR  Negative     All pertinent labs within the past 24 hours have been reviewed.    Significant Imaging: I have reviewed all pertinent imaging results/findings within the past 24 hours.

## 2018-09-02 NOTE — DISCHARGE SUMMARY
"[facility] Hospital Medicine  Discharge Summary      Patient Name: Vel Ordonez  MRN: [id number]  Admission Date: 9/1/2018  Hospital Length of Stay: 0 days  Discharge Date and Time:  09/02/2018 1:13 PM  Attending Physician: Diana Bui MD   Discharging Provider: Leslie Nelson NP  Primary Care Provider: [facility] Medicine Team: [facility] [place] [facility] J Leslie Nelson NP    HPI:   Mr. Ordonez is a 31yo man with a past medical history of bipolar 1 and hypertension.  He has an interesting recent cardiac history.  He was recently admitted to [facility] on 7/17-1/18 after having 3 days of chest pain.  At that time he had a normal EKG, though all three measured troponins were elevated, the highest at 0.266 over night.  Cardiology took him for left heart cath on 7/18/18, which revealed clean coronary arteries (see report below).  He was placed on ASA, beta-blocker and statin.  He was subsequently readmitted on 7/20/18-7/212/18 with chest pain, syncope and complaints, "of having brief periods of unresponsiveness and stiffness for the past 2 weeks since starting depakote for bipolar disorder." Neurology weren't sure if these represented seizures or not, so set him up to see [facility] at discharge.     After being discharged, he was set up with a 30 day cardiac event monitor on 8/10/18 by the arrhythmia clinic.  On 8/27/18, per telephone records, he, "sent in a patient activated alert which is noted as Sinus Aleks/ Sinus Arrhythmia with the rates of 56-59 beats per minute. Monitoring company contacted patient. Patient symptoms noted as passing out. Patient stated that he fell on the bed when passed out , No injuries. 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  He called his wife to tell her that he was not feeling well (she took a picture of the sweat on his brow).  At this same time " "the heart monitor company called her and stated that he was having an abnormality to his rhythm and asked if he had passed out.  Around this same instance, he lost consciousness and slumped over the forklift seat for an unknown period of time.  During the event he did not fall to the floor, strike his head or have bladder or bowel incontinence.  He also denies any chest pain or shortness of breath around that same time.  Currently he, "just feels a little weak and flushed." The wife states that right after talking to the heart monitor attendant, she got a call from her 's coworkers from his phone to come get him due to losing consciousness.  She called his cardiologist then came to the ED as recommended. All past medical, social, and family history reviewed.        In the ED, the PA was able to contact the heart monitor company, which identified the patient as having gone into a period of SVT.  She notes, "Update: I've contacted the monitoring company nurse, who reports the EKG read (during episode) run of SVT up to 217 with conversion into sinus tach up to 130." Otherwise CBC, chemistry unremarkable, troponin negative, TSH WNL, CXR without acute findings, EKG with NSR, UDS/UA negative, and HgA1c 5.3/105. The patient was placed in observation to the Hospital Medicine Service for further evaluation and treatment.     * No surgery found *      Hospital Course:   Mr. Ordonez was placed in observation 09/01 due to cardiogenic syncope likely due to SVT. Since arrival no further symptoms and he as remained in NSR, transitioned to metoprolol for beta blockade. Home ASA, statin, depakote, protonix, and seroquel continued. No neuro deficits or seizure like activity noted. 2D echo in July with normal EF, no WMAs, concentric hypertrophy, trivial MR, trivial, TR, and RAP I. EP consulted, recommend toprol XL 50 mg daily, syncope precautions, no driving or operating heavy machinery; and follow up with [facility] or [facility] " after completion of 30 day event monitor to discuss treatment options.    Disposition: home with spouse, [facility] or [facility] after completion of 30 day event monitor to discuss treatment options; numbers provided. Reinforced no driving or operating heavy machinery. Encouraged to keep [facility] follow up.       Consults:   Consults (From admission, onward)        Status Ordering Provider     Inpatient consult to Electrophysiology  Once     Provider:  (Not yet assigned)    Acknowledged LEDY RAM        * Syncope, cardiogenic-resolved as of 9/2/2018    -event monitor company notes preceding syncope, run of SVT up to 217 with conversion into sinus tach up to 130  -admission CBC, chemistry unremarkable, troponin negative, TSH WNL, CXR without acute findings, EKG with NSR, UDS/UA negative, and HgA1c 5.3/105  -no further symptoms and he as remained in NSR  -transitioned to metoprolol for beta blockade  -home ASA, statin, depakote, protonix, and seroquel continued  -no neuro deficits or seizure like activity noted  -2D echo in July with normal EF, no WMAs, concentric hypertrophy, trivial MR, trivial, TR, and RAP 8  -EP consulted, recommend toprol XL 50 mg daily, syncope precautions, no driving or operating heavy machinery; and follow up with [facility] or [facility] after completion of 30 day event monitor to discuss treatment options  -no events on tele   -cardiac diet encouraged        Paroxysmal SVT (supraventricular tachycardia)    -see above   -[employer] faxed event report to ED        Mixed hyperlipidemia    -continue statin         Bipolar 1 disorder    -no acute issues  -continue home depakote and seroquel  -encouraged to keep [facility] follow up           Final Active Diagnoses:    Diagnosis Date Noted POA    Paroxysmal SVT (supraventricular tachycardia) [I47.1] 09/01/2018 Yes    Mixed hyperlipidemia [E78.2] 08/01/2018 Yes    Bipolar 1 disorder [F31.9] 07/18/2018 Yes     Chronic      Problems Resolved  During this Admission:    Diagnosis Date Noted Date Resolved POA    PRINCIPAL PROBLEM:  Syncope, cardiogenic [R55] 09/01/2018 09/02/2018 Yes     Discharged Condition: good    Disposition: Home    Follow Up:  Follow-up Information     Schedule an appointment as soon as possible for a visit with Demian Davis MD.    Specialties:  Cardiology, Electrophysiology  Why:  after completion of 30 day event monitor, if able to schedule  Contact information:  [address]  [facility] [address]  [phone]             Schedule an appointment as soon as possible for a visit with Bao Castañeda MD.    Specialty:  Cardiology  Why:  if unable to schedule with Dr. Davis, please call [facility] electrophysiologist Dr. Castañeda for appointment once 30 day event monitor completed  Contact information:  [address]  [facility] [address]  [phone]                 Patient Instructions:   No discharge procedures on file.    Review of Systems   Constitutional: Negative for chills, diaphoresis, fever, malaise/fatigue and weight loss.   HENT: Negative for sinus pain and sore throat.    Eyes: Negative for blurred vision and double vision.   Respiratory: Negative for cough, sputum production, shortness of breath and wheezing.    Cardiovascular: Negative for chest pain, palpitations, leg swelling and PND.   Gastrointestinal: Negative for constipation, diarrhea, heartburn, nausea and vomiting.   Genitourinary: Negative for dysuria and urgency.   Musculoskeletal: Negative for back pain, falls, joint pain, myalgias and neck pain.   Skin: Negative for itching and rash.   Neurological: Negative for dizziness, seizures, loss of consciousness, weakness and headaches.   Psychiatric/Behavioral: Negative for depression, substance abuse and suicidal ideas. The patient is not nervous/anxious and does not have insomnia.      Physical Exam   Constitutional: He is oriented to person, place, and time. He appears well-developed and well-nourished.   HENT:    Head: Normocephalic and atraumatic.   Mouth/Throat: Mucous membranes are normal. Abnormal dentition.   Eyes: Conjunctivae and lids are normal. Pupils are equal, round, and reactive to light. No scleral icterus.   Neck: Normal range of motion. Neck supple. No JVD present.   Cardiovascular: Normal rate, regular rhythm, normal heart sounds and intact distal pulses. Exam reveals no gallop and no friction rub.   No murmur heard.  Pulmonary/Chest: Effort normal and breath sounds normal. He exhibits no tenderness.   Abdominal: Soft. Bowel sounds are normal. He exhibits no distension. There is no tenderness.   Musculoskeletal: Normal range of motion. He exhibits no edema or deformity.   Neurological: He is alert and oriented to person, place, and time. No cranial nerve deficit.   Skin: Skin is warm and dry. Capillary refill takes less than 2 seconds. No rash noted. No erythema.   Psychiatric: He has a normal mood and affect. Judgment and thought content normal.   Nursing note and vitals reviewed.    Significant Diagnostic Studies: Labs:   BMP:   Recent Labs   Lab  09/01/18   1643  09/02/18   0625   GLU  109  91   NA  143  144   K  3.9  3.7   CL  109  107   CO2  24  26   BUN  19  20   CREATININE  1.0  0.8   CALCIUM  9.3  9.2   MG  2.2  2.4   , CBC   Recent Labs   Lab  09/01/18   1643  09/02/18   0625   WBC  14.40*  9.92   HGB  15.3  14.6   HCT  45.9  45.9   PLT  339  293   , Lipid Panel   Lab Results   Component Value Date    CHOL 129 07/18/2018    HDL 20 (L) 07/18/2018    LDLCALC 77.8 07/18/2018    TRIG 156 (H) 07/18/2018    CHOLHDL 15.5 (L) 07/18/2018   , Troponin   Recent Labs   Lab  09/01/18   1643   TROPONINI  <0.006   , A1C:   Recent Labs   Lab  07/18/18   0808  09/02/18   0626   HGBA1C  5.1  5.3    and All labs within the past 24 hours have been reviewed    Pending Diagnostic Studies:     Procedure Component Value Units Date/Time    2D echo with color flow doppler [[id number]]     Order Status:  Sent Lab Status:  No  result          Medications:  Reconciled Home Medications:      Medication List      ASK your doctor about these medications    aspirin 81 MG EC tablet  Commonly known as:  ECOTRIN  Take 1 tablet (81 mg total) by mouth once daily.     atorvastatin 80 MG tablet  Commonly known as:  LIPITOR  Take 1 tablet (80 mg total) by mouth once daily.     carvedilol 6.25 MG tablet  Commonly known as:  COREG  Take 0.5 tablets (3.125 mg total) by mouth 2 (two) times daily.     divalproex 250 MG EC tablet  Commonly known as:  DEPAKOTE  Take 500 mg by mouth 3 (three) times daily. 250mg daily  500mg afternoon and 500mg nightly     ondansetron 4 MG Tbdl  Commonly known as:  ZOFRAN-ODT  Take 1 tablet (4 mg total) by mouth every 8 (eight) hours as needed (nausea).     oxyCODONE-acetaminophen 5-325 mg per tablet  Commonly known as:  PERCOCET  Take 1 tablet by mouth every 6 (six) hours as needed for Pain.     pantoprazole 40 MG tablet  Commonly known as:  PROTONIX  Take 1 tablet (40 mg total) by mouth once daily.     QUEtiapine 100 MG Tab  Commonly known as:  SEROQUEL  Take by mouth every evening.     valACYclovir 1000 MG tablet  Commonly known as:  VALTREX  Take 1 tablet (1,000 mg total) by mouth 3 (three) times daily. for 7 days          Indwelling Lines/Drains at time of discharge:   Lines/Drains/Airways          None        Time spent on the discharge of patient: 25 minutes  Patient was seen and examined on the date of discharge and determined to be suitable for discharge.      Leslie Nelson, DNP, AG-ACNP, BC  Department of Hospital Medicine  [facility]

## 2018-09-02 NOTE — PROGRESS NOTES
"[facility]   Cardiology  Consult Note    Patient Name: Vel Ordonez  MRN: [id number]  Admission Date: 9/1/2018  Hospital Length of Stay: 0 days  Code Status: Prior   Attending Physician: Diana Bui MD   Primary Care Physician: [facility]  Principal Problem:<principal problem not specified>    Subjective:     Interval History: Mr Ordonez is a 32YOWM who presents for syncopal episode earlier this afternoon which correlated with SVT on his event monitor. 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 He stayed out for 5-10 seconds and recovery consciousness afterwards. Denied any prodrome symptoms. He woke up alone, slumped over the forklift chair. After these episodes, patient feels "tired" for several minutes. He was called by the monitoring company for alarming activity on his monitor. T  His past medical history is significant for HTN and BP 1 disorder. Patient is currently undergoing w/u for syncopal/possible seizure episodes. In short, patient began experiencing multiple syncopal episodes in July. These episodes have been described by wife as patient becoming stiff and "shaking all over", followed by lethargy.   In the same few day span, patient began experiencing substernal chest pain. He was found to have an NSTEMI with negative angiogram. Currently, he has a 30d cardiac monitor. In addition, neurology has seen the patient in the hospital and recommends outpatient EEG to further qualify these possible seizure episodes.  Neurology saw him in the ED and recommended EEG work up as outpatient.     Currently, patient is asymptomatic and has no complaints. He denies fever, chills, cough, continuing CP, SOB, abdominal pain, dysuria. He denies SI/HI and states his mood has been improving on Depakote.      EKG showed NSR with iRBB and normal QTc.     Objective:     Vital Signs (Most Recent):  Temp: 97.6 °F " (36.4 °C) (09/01/18 1710)  Pulse: 89 (09/01/18 1802)  Resp: 20 (09/01/18 1802)  BP: 116/72 (09/01/18 1802)  SpO2: 99 % (09/01/18 1802) Vital Signs (24h Range):  Temp:  [97.6 °F (36.4 °C)-98.4 °F (36.9 °C)] 97.6 °F (36.4 °C)  Pulse:  [83-96] 89  Resp:  [18-20] 20  SpO2:  [96 %-99 %] 99 %  BP: (116-146)/(61-99) 116/72     Weight: 124.3 kg (274 lb)  Body mass index is 34.25 kg/m².    SpO2: 99 %       No intake or output data in the 24 hours ending 09/01/18 1855    Lines/Drains/Airways     Peripheral Intravenous Line                 Peripheral IV - Single Lumen 09/01/18 1629 Left Wrist less than 1 day                Physical Exam   Constitutional: He is oriented to person, place, and time. He appears well-developed and well-nourished.   HENT:   Head: Normocephalic and atraumatic.   Eyes: Conjunctivae are normal.   Neck: Normal range of motion. Neck supple. No JVD present. No thyromegaly present.   Cardiovascular: Normal rate and regular rhythm. Exam reveals gallop. Exam reveals no friction rub.   No murmur heard.  Pulmonary/Chest: Effort normal and breath sounds normal. No respiratory distress. He has no wheezes.   Abdominal: Soft. Bowel sounds are normal. He exhibits no distension. There is no tenderness. There is no rebound.   Musculoskeletal: Normal range of motion.   Neurological: He is oriented to person, place, and time.   Skin: Skin is warm.   Nursing note and vitals reviewed.      Significant Labs:   BMP:   Recent Labs   Lab  09/01/18   1643   GLU  109   NA  143   K  3.9   CL  109   CO2  24   BUN  19   CREATININE  1.0   CALCIUM  9.3   MG  2.2   , CMP   Recent Labs   Lab  09/01/18   1643   NA  143   K  3.9   CL  109   CO2  24   GLU  109   BUN  19   CREATININE  1.0   CALCIUM  9.3   PROT  7.5   ALBUMIN  3.9   BILITOT  0.4   ALKPHOS  79   AST  30   ALT  63*   ANIONGAP  10   ESTGFRAFRICA  >60.0   EGFRNONAA  >60.0   , Troponin   Recent Labs   Lab  09/01/18   1643   TROPONINI  <0.006    and All pertinent lab results  from the last 24 hours have been reviewed.    Significant Imaging: Echocardiogram:   2D echo with color flow doppler:   Results for orders placed or performed during the hospital encounter of 07/17/18   2D echo with color flow doppler   Result Value Ref Range    EF 55 55 - 65    Mitral Valve Regurgitation TRIVIAL     Est. PA Systolic Pressure 28.79     Pericardial Effusion NONE     Mitral Valve Mobility NORMAL     Tricuspid Valve Regurgitation TRIVIAL      Assessment and Plan:     Patient is a 32 y.o. male presenting with:    Syncope, cardiogenic    -Patient had loss of consciousness during SVT with  bpm's. Patient reports palpitations right before the episode. Syncope only lasted for 5-10 seconds. EKG at baseline showed NSR, iRRB and QTc 410 msecs  -Admit to [facility] for telemetry monitoring  -EP consult in the AM to evaluate in-patient vs. Outpatient SVT work up given the associated syncopal event with SVT  -2D echo with CFD  -start metoprolol tartrate 25 mg po BID              VTE Risk Mitigation (From admission, onward)    None          Demian Diaz MD  Cardiology  [facility]

## 2018-09-02 NOTE — CONSULTS
"[facility]  Cardiac Electrophysiology  Consult Note    Admission Date: 9/1/2018  Code Status: Full Code   Attending Provider: No att. providers found  Consulting Provider: Zamzam Garcia MD  Principal Problem:Syncope, cardiogenic    Inpatient consult to Electrophysiology  Consult performed by: Zamzam Garcia MD  Consult ordered by: Leslie Nelson NP        Subjective:     Chief Complaint:  Syncope     HPI:   Mr Ordonez is a 33 yo M who presents for syncopal episode yesterday afternoon which correlated with SVT on his event monitor. 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 He stayed out for 5-10 seconds and recovery consciousness afterwards. Denied any prodrome symptoms. He woke up alone, slumped over the [employer] chair. After these episodes, patient feels "tired" for several minutes. He was called by the monitoring company for alarming activity on his monitor. T  His past medical history is significant for HTN and BP 1 disorder. Patient is currently undergoing w/u for syncopal/possible seizure episodes. In short, patient began experiencing multiple syncopal episodes in July. These episodes have been described by wife as patient becoming stiff and "shaking all over", followed by lethargy.   In the same few day span, patient began experiencing substernal chest pain. He was found to have an NSTEMI with negative angiogram. Currently, he has a 30d cardiac monitor. In addition, neurology has seen the patient in the hospital and recommends outpatient EEG to further qualify these possible seizure episodes.  Neurology saw him in the ED and recommended EEG work up as outpatient.      Currently, patient is asymptomatic and has no complaints. He denies fever, chills, cough, continuing CP, SOB, abdominal pain, dysuria. He denies SI/HI and states his mood has been improving on Depakote.       EKG showed NSR with iRBB and normal QTc. "            Past Medical History:   Diagnosis Date    Bipolar 1 disorder 2011    Hypertension        History reviewed. No pertinent surgical history.    Review of patient's allergies indicates:  No Known Allergies    No current facility-administered medications on file prior to encounter.      Current Outpatient Medications on File Prior to Encounter   Medication Sig    aspirin (ECOTRIN) 81 MG EC tablet Take 1 tablet (81 mg total) by mouth once daily.    atorvastatin (LIPITOR) 80 MG tablet Take 1 tablet (80 mg total) by mouth once daily.    divalproex (DEPAKOTE) 250 MG EC tablet Take 500 mg by mouth 3 (three) times daily. 250mg daily  500mg afternoon and 500mg nightly    ondansetron (ZOFRAN-ODT) 4 MG TbDL Take 1 tablet (4 mg total) by mouth every 8 (eight) hours as needed (nausea).    pantoprazole (PROTONIX) 40 MG tablet Take 1 tablet (40 mg total) by mouth once daily.    QUEtiapine (SEROQUEL) 100 MG Tab Take by mouth every evening.    [DISCONTINUED] carvedilol (COREG) 6.25 MG tablet Take 0.5 tablets (3.125 mg total) by mouth 2 (two) times daily.    oxyCODONE-acetaminophen (PERCOCET) 5-325 mg per tablet Take 1 tablet by mouth every 6 (six) hours as needed for Pain.    [DISCONTINUED] valACYclovir (VALTREX) 1000 MG tablet Take 1 tablet (1,000 mg total) by mouth 3 (three) times daily. for 7 days     Family History     Problem Relation (Age of Onset)    Cancer Mother    Diabetes Father    Heart disease Father        Tobacco Use    Smoking status: Never Smoker    Smokeless tobacco: Never Used   Substance and Sexual Activity    Alcohol use: No    Drug use: No    Sexual activity: Yes     Partners: Female     Review of Systems   Constitution: Negative for chills, decreased appetite, diaphoresis, fever, weakness, malaise/fatigue, night sweats, weight gain and weight loss.   Eyes: Negative.    Cardiovascular: Negative for chest pain, irregular heartbeat, leg swelling, near-syncope, orthopnea, palpitations,  paroxysmal nocturnal dyspnea and syncope.   Respiratory: Negative for cough, shortness of breath, sputum production and wheezing.    Endocrine: Negative.    Hematologic/Lymphatic: Negative for bleeding problem.   Skin: Negative for color change, flushing, rash and suspicious lesions.   Musculoskeletal: Negative.    Gastrointestinal: Negative for bloating, abdominal pain, change in bowel habit, constipation, diarrhea, heartburn, melena, nausea and vomiting.   Genitourinary: Negative for flank pain, frequency, hematuria, incomplete emptying and urgency.   Neurological: Negative for dizziness, focal weakness, headaches, light-headedness, numbness, paresthesias, seizures and sensory change.   Psychiatric/Behavioral: Negative for altered mental status. The patient is not nervous/anxious.      Objective:     Vital Signs (Most Recent):  Temp: 98.4 °F (36.9 °C) (09/02/18 1152)  Pulse: 64 (09/02/18 1200)  Resp: 17 (09/02/18 1152)  BP: 117/71 (09/02/18 1152)  SpO2: 99 % (09/02/18 1152) Vital Signs (24h Range):  Temp:  [97.6 °F (36.4 °C)-98.4 °F (36.9 °C)] 98.4 °F (36.9 °C)  Pulse:  [61-96] 64  Resp:  [14-20] 17  SpO2:  [95 %-99 %] 99 %  BP: (105-146)/(55-99) 117/71       Weight: 124.3 kg (274 lb 0.5 oz)  Body mass index is 34.25 kg/m².    SpO2: 99 %  O2 Device (Oxygen Therapy): room air    Physical Exam   Constitutional: He is oriented to person, place, and time. He appears well-developed and well-nourished. No distress.   HENT:   Head: Normocephalic and atraumatic.   Mouth/Throat: Oropharynx is clear and moist.   Eyes: EOM are normal. Pupils are equal, round, and reactive to light.   Neck: Normal range of motion. Neck supple. No JVD present.   Cardiovascular: Normal rate and regular rhythm. Exam reveals no gallop and no friction rub.   No murmur heard.  Pulmonary/Chest: Effort normal and breath sounds normal. No respiratory distress. He has no wheezes. He has no rales. He exhibits no tenderness.   Abdominal: Soft. Bowel  sounds are normal. He exhibits no distension. There is no tenderness.   Musculoskeletal: Normal range of motion. He exhibits no edema.   Neurological: He is alert and oriented to person, place, and time.   Skin: Skin is warm and dry. No erythema.   Psychiatric: He has a normal mood and affect. His behavior is normal. Judgment and thought content normal.       Significant Labs: All pertinent lab results from the last 24 hours have been reviewed.    Significant Imaging: Reviewed in EPIC.              Assessment and Plan:     Paroxysmal SVT (supraventricular tachycardia)    33 yo M with HTN and bipolar d/o who is admitted on 9/1 due to recurrent syncope correlated with episode of SVT on his 30-day cardiac event monitor.    Review of patient's rhythm strip demonstrates narrow complex tachycardia (132-217 bpm) lasting approx ~ 40 seconds with gradual slowing before terminating back to NSR; p waves not visible during tachycardia. DDx: AVNRT vs atrial tachycardia vs AVRT    Please refer to separate progress note from 9/2/2018 @2:24 PM for review of rhythm strips.    Recommendations:  -- Preferentially initiate Toprol XL 50 mg daily over PTA carvedilol (please DC).  -- Advised patient to continue wearing his cardiac event monitor.  -- He will need close follow up with our EP clinic (Dr. Davis) at the end of his 30-day monitor to discuss ablation as definitive treatment strategy. Alternatively, if patient is unable to be scheduled within a reasonable time frame due to his insurance he can follow up with [facility] -- patient provided with name and contact information of [facility] EP provider(s).   -- Syncope precautions at all times until definitive treatment.   -- Patient and his significant other informed of the above plan to which they expressed understanding. All questions addressed.              Thank you for your consult.     Zamzam Garcia MD  Cardiac Electrophysiology  [facility]    This patient  was discussed with Dr. Davis.

## 2018-09-02 NOTE — ASSESSMENT & PLAN NOTE
-event monitor company notes preceding syncope, run of SVT up to 217 with conversion into sinus tach up to 130  -admission CBC, chemistry unremarkable, troponin negative, TSH WNL, CXR without acute findings, EKG with NSR, UDS/UA negative, and HgA1c 5.3/105  -no further symptoms and he as remained in NSR  -transitioned to metoprolol for beta blockade  -home ASA, statin, depakote, protonix, and seroquel continued  -no neuro deficits or seizure like activity noted  -2D echo in July with normal EF, no WMAs, concentric hypertrophy, trivial MR, trivial, TR, and RAP 8  -EP consulted, recommend toprol XL 50 mg daily, syncope precautions, no driving or operating heavy machinery; and follow up with [facility] EP or [facility] EP after completion of 30 day event monitor to discuss treatment options  -no events on tele   -cardiac diet encouraged

## 2018-09-02 NOTE — ASSESSMENT & PLAN NOTE
-no acute issues  -continue home depakote and seroquel  -encouraged to keep [facility] Neurology follow up

## 2018-09-02 NOTE — PLAN OF CARE
Problem: Patient Care Overview  Goal: Plan of Care Review  Outcome: Ongoing (interventions implemented as appropriate)  Discharge summary reviewed with pt and pt's friend. Both verbalized understanding and all questions were answered. IV catheter and heart monitor d/c. Pt refused transport. Will ambulate off unit. NAD noted.

## 2018-09-04 ENCOUNTER — DOCUMENTATION ONLY (OUTPATIENT)
Dept: ELECTROPHYSIOLOGY | Facility: CLINIC | Age: 33
End: 2018-09-04

## 2018-09-04 ENCOUNTER — HOSPITAL ENCOUNTER (EMERGENCY)
Facility: HOSPITAL | Age: 33
Discharge: HOME | End: 2018-09-05
Attending: EMERGENCY MEDICINE
Payer: MEDICAID

## 2018-09-04 DIAGNOSIS — R11.0 NAUSEA: Primary | ICD-10-CM

## 2018-09-04 DIAGNOSIS — R00.0 TACHYCARDIA: ICD-10-CM

## 2018-09-04 PROCEDURE — 99284 EMERGENCY DEPT VISIT MOD MDM: CPT | Mod: ,,, | Performed by: PHYSICIAN ASSISTANT

## 2018-09-04 PROCEDURE — 93005 ELECTROCARDIOGRAM TRACING: CPT

## 2018-09-04 PROCEDURE — 99284 EMERGENCY DEPT VISIT MOD MDM: CPT | Mod: 25

## 2018-09-04 PROCEDURE — 93010 ELECTROCARDIOGRAM REPORT: CPT | Mod: ,,, | Performed by: INTERNAL MEDICINE

## 2018-09-05 ENCOUNTER — PATIENT MESSAGE (OUTPATIENT)
Dept: CARDIOLOGY | Facility: CLINIC | Age: 33
End: 2018-09-05

## 2018-09-05 ENCOUNTER — TELEPHONE (OUTPATIENT)
Dept: ELECTROPHYSIOLOGY | Facility: CLINIC | Age: 33
End: 2018-09-05

## 2018-09-05 VITALS
HEART RATE: 77 BPM | SYSTOLIC BLOOD PRESSURE: 122 MMHG | HEIGHT: 76 IN | TEMPERATURE: 98 F | WEIGHT: 274 LBS | DIASTOLIC BLOOD PRESSURE: 81 MMHG | OXYGEN SATURATION: 97 % | RESPIRATION RATE: 21 BRPM | BODY MASS INDEX: 33.36 KG/M2

## 2018-09-05 LAB
ALBUMIN SERPL BCP-MCNC: 4 G/DL
ALP SERPL-CCNC: 77 U/L
ALT SERPL W/O P-5'-P-CCNC: 60 U/L
ANION GAP SERPL CALC-SCNC: 10 MMOL/L
APTT BLDCRRT: 25.5 SEC
AST SERPL-CCNC: 25 U/L
BASOPHILS # BLD AUTO: 0.07 K/UL
BASOPHILS NFR BLD: 0.4 %
BILIRUB SERPL-MCNC: 0.4 MG/DL
BUN SERPL-MCNC: 20 MG/DL
CALCIUM SERPL-MCNC: 9.9 MG/DL
CHLORIDE SERPL-SCNC: 107 MMOL/L
CO2 SERPL-SCNC: 25 MMOL/L
CREAT SERPL-MCNC: 0.9 MG/DL
DIFFERENTIAL METHOD: ABNORMAL
EOSINOPHIL # BLD AUTO: 0.3 K/UL
EOSINOPHIL NFR BLD: 2.2 %
ERYTHROCYTE [DISTWIDTH] IN BLOOD BY AUTOMATED COUNT: 13.9 %
EST. GFR  (AFRICAN AMERICAN): >60 ML/MIN/1.73 M^2
EST. GFR  (NON AFRICAN AMERICAN): >60 ML/MIN/1.73 M^2
GLUCOSE SERPL-MCNC: 91 MG/DL
HCT VFR BLD AUTO: 47.3 %
HGB BLD-MCNC: 15.6 G/DL
IMM GRANULOCYTES # BLD AUTO: 0.06 K/UL
IMM GRANULOCYTES NFR BLD AUTO: 0.4 %
INR PPP: 0.9
LYMPHOCYTES # BLD AUTO: 3.6 K/UL
LYMPHOCYTES NFR BLD: 22.9 %
MCH RBC QN AUTO: 30.8 PG
MCHC RBC AUTO-ENTMCNC: 33 G/DL
MCV RBC AUTO: 93 FL
MONOCYTES # BLD AUTO: 2 K/UL
MONOCYTES NFR BLD: 12.6 %
NEUTROPHILS # BLD AUTO: 9.7 K/UL
NEUTROPHILS NFR BLD: 61.5 %
NRBC BLD-RTO: 0 /100 WBC
PLATELET # BLD AUTO: 312 K/UL
PMV BLD AUTO: 10.4 FL
POTASSIUM SERPL-SCNC: 3.9 MMOL/L
PROT SERPL-MCNC: 7.7 G/DL
PROTHROMBIN TIME: 9.8 SEC
RBC # BLD AUTO: 5.07 M/UL
SODIUM SERPL-SCNC: 142 MMOL/L
T4 FREE SERPL-MCNC: 0.8 NG/DL
TROPONIN I SERPL DL<=0.01 NG/ML-MCNC: <0.006 NG/ML
TSH SERPL DL<=0.005 MIU/L-ACNC: 4.81 UIU/ML
WBC # BLD AUTO: 15.77 K/UL

## 2018-09-05 PROCEDURE — 85730 THROMBOPLASTIN TIME PARTIAL: CPT

## 2018-09-05 PROCEDURE — 80053 COMPREHEN METABOLIC PANEL: CPT

## 2018-09-05 PROCEDURE — 84443 ASSAY THYROID STIM HORMONE: CPT

## 2018-09-05 PROCEDURE — 84439 ASSAY OF FREE THYROXINE: CPT

## 2018-09-05 PROCEDURE — 85025 COMPLETE CBC W/AUTO DIFF WBC: CPT

## 2018-09-05 PROCEDURE — 85610 PROTHROMBIN TIME: CPT

## 2018-09-05 PROCEDURE — 84484 ASSAY OF TROPONIN QUANT: CPT

## 2018-09-05 NOTE — ED TRIAGE NOTES
Pt reports to Ed with c/o tachycardia. Pt was d/c Sunday for tachycardia and recent NSTEMI and sent home with halter monitor. Pt reports monitor tech began calling today and told to come to the ER.  Pt reports felling lightheaded but has since resolved. HR currently 84 bpm. Pt denies CP, SOB, N/V/D at this time.    Patient identifiers verified and correct for Vel Ordonez.  LOC: The patient is awake, alert and aware of environment with an appropriate affect, the patient is oriented x 3 and speaking appropriately.   APPEARANCE: Patient appears comfortable and in no acute distress, patient is clean and well groomed.  SKIN: The skin is warm and dry, color consistent with ethnicity, patient has normal skin turgor and moist mucus membranes, skin intact, no breakdown or bruising noted.   MUSCULOSKELETAL: Patient moving all extremities spontaneously, no swelling noted.  RESPIRATORY: Airway is open and patent, respirations are spontaneous, patient has a normal effort and rate, no accessory muscle use noted, pt placed on continuous pulse ox with O2 sats noted at 99% on room air.  CARDIAC: Pt placed on cardiac monitor. Patient has a normal rate and regular rhythm, no edema noted, capillary refill < 3 seconds.   GASTRO: Soft and non tender to palpation, no distention noted, normoactive bowel sounds present in all four quadrants. Pt states bowel movements have been regular.  : Pt denies any pain or frequency with urination.  NEURO: Pt opens eyes spontaneously, behavior appropriate to situation, follows commands, facial expression symmetrical, bilateral hand grasp equal and even, purposeful motor response noted, normal sensation in all extremities when touched with a finger.

## 2018-09-05 NOTE — TELEPHONE ENCOUNTER
Returned patient call. I spoke to his wife Sharon and we  scheduled for  ED  D/c follow-up appointment and mailed to patient.

## 2018-09-05 NOTE — TELEPHONE ENCOUNTER
----- Message from Makayla Mancuso sent at 9/5/2018 10:22 AM CDT -----  Contact: Pt called   Pt calling to schedule a hospital f/u with Dr. Davis. Please call pt @ [phone].Thank you.

## 2018-09-05 NOTE — ED PROVIDER NOTES
Encounter Date: 9/4/2018       History     Chief Complaint   Patient presents with    Tachycardia     Pt is wearing holter monitor for tachycardia and recent NSTEMI. Just discharged on Sunday. Monitor tech called patient to come to ER for tachycardia but they did not tell him how high. Pt was feeling lightheaded earlier. HR in triage is 86 bpm.       Patient is a 32-year-old male with history of hypertension , bipolar1 disorder is presenting to the ER for evaluation of tachycardia.  Patient  states that he has a 30day  Holter monitor on currently.  He was told to come into the ED for evaluation as the alarm did go off.  He states that around 5 p.m. Today he felt diaphoretic and clammy.  He fell nauseated at times as well.  This went on for about a few hours intermittently.  He denied chest pain, palpitations or shortness of breath at the time.  Denies any vomiting.  Patient states that he feels completely fine right now but wanted to get checked out.  No prior history of PE or DVT.  He is currently on aspirin.  No other blood thinners.  Patient did recently undergo a cardiac catheterization with no stent placement.       The history is provided by the patient.     Review of patient's allergies indicates:  No Known Allergies  Past Medical History:   Diagnosis Date    Bipolar 1 disorder 2011    Hypertension     Supraventricular tachycardia      History reviewed. No pertinent surgical history.  Family History   Problem Relation Age of Onset    Cancer Mother     Heart disease Father     Diabetes Father      Social History     Tobacco Use    Smoking status: Never Smoker    Smokeless tobacco: Never Used   Substance Use Topics    Alcohol use: No    Drug use: No     Review of Systems   Constitutional: Negative for chills.   HENT: Negative for congestion.    Respiratory: Negative for cough and shortness of breath.    Cardiovascular: Negative for chest pain and palpitations.   Gastrointestinal: Positive for nausea.  Negative for abdominal pain and vomiting.   Genitourinary: Negative for flank pain.   Musculoskeletal: Negative for myalgias.   Skin: Negative for rash.   Allergic/Immunologic: Negative for immunocompromised state.   Neurological: Negative for dizziness, weakness and light-headedness.   Hematological: Does not bruise/bleed easily.   Psychiatric/Behavioral: Negative for confusion.       Physical Exam     Initial Vitals [09/04/18 2245]   BP Pulse Resp Temp SpO2   125/74 86 20 98.2 °F (36.8 °C) 99 %      MAP       --         Physical Exam    Constitutional: He appears well-developed and well-nourished. He is not diaphoretic. No distress.   HENT:   Head: Normocephalic and atraumatic.   Mouth/Throat: Oropharynx is clear and moist.   Eyes: Conjunctivae are normal. Pupils are equal, round, and reactive to light.   Neck: Neck supple.   Cardiovascular: Normal rate, regular rhythm, normal heart sounds and intact distal pulses.   Pulmonary/Chest: Breath sounds normal.   Abdominal: Soft. He exhibits no distension. There is no tenderness.   Neurological: He is alert and oriented to person, place, and time.   Skin: Skin is warm and dry.         ED Course   Procedures  Labs Reviewed   COMPREHENSIVE METABOLIC PANEL - Abnormal; Notable for the following components:       Result Value    ALT 60 (*)     All other components within normal limits   CBC W/ AUTO DIFFERENTIAL - Abnormal; Notable for the following components:    WBC 15.77 (*)     Gran # (ANC) 9.7 (*)     Immature Grans (Abs) 0.06 (*)     Mono # 2.0 (*)     All other components within normal limits   TSH - Abnormal; Notable for the following components:    TSH 4.810 (*)     All other components within normal limits   APTT   PROTIME-INR   TROPONIN I   T4, FREE          Imaging Results    None                APC / Resident Notes:       Patient is seen in the ER promptly upon arrival.  He is afebrile, no acute distress.  No active chest pain, shortness of breath or tachycardia.   He is placed on the cardiac monitor.  Patient appears to be comfortable.      I discussed the case with the holter monitor company; [employer].  I was informed that patient was in new onset atrial flutter at a rate of 157-165 beats per minutefrom 530pm up until 816pm.  He then converted to normal sinus rhythm at 9:23 p.m..    On chart review patient does have an extensive cardiac history   And workup.  Patient was recently admitted in July for having chest pain.  He did have an elevated troponin at that time.  Was taken to cath lab with clean coronary arteries.  He was placed on aspirin, beta blockers and statin.  He was again readmitted later in July for what appeared to be syncopal episodes.  Upon discharge she was placed on a Holter monitor.  He recently had SVTs   Noted on Holter monitor.      Laboratory studies reveal   Leukocytosis of 15.  Chemistries were unremarkable.  Cardiac workup unremarkable.      Discussed case with Cardiology fellow at 1:36am.  [place] that given patient is asymptomatic and has not had any arrhythmia while in ED that he was stable for follow-up as an outpatient in cardiology clinic. Did not feel admission is required at this time. Patient was given strict return precautions.  He was agreeable to this treatment plan.  He is stable for discharge at this time. The care of this patient was overseen by attending physician who agrees with treatment, plan, and disposition.                   Clinical Impression:   The primary encounter diagnosis was Nausea. A diagnosis of Tachycardia was also pertinent to this visit.      Disposition:   Disposition: Discharged  Condition: Stable                        Sofia Jensen PA-C  09/05/18 0210       Sofia Jensen PA-C  09/12/18 [id number]

## 2018-09-06 ENCOUNTER — OFFICE VISIT (OUTPATIENT)
Dept: CARDIOLOGY | Facility: CLINIC | Age: 33
End: 2018-09-06
Payer: MEDICAID

## 2018-09-06 VITALS
HEIGHT: 76 IN | OXYGEN SATURATION: 99 % | RESPIRATION RATE: 15 BRPM | HEART RATE: 82 BPM | DIASTOLIC BLOOD PRESSURE: 82 MMHG | BODY MASS INDEX: 32.73 KG/M2 | SYSTOLIC BLOOD PRESSURE: 124 MMHG | WEIGHT: 268.75 LBS

## 2018-09-06 DIAGNOSIS — R55 SYNCOPE, CARDIOGENIC: ICD-10-CM

## 2018-09-06 DIAGNOSIS — I47.10 PAROXYSMAL SVT (SUPRAVENTRICULAR TACHYCARDIA): ICD-10-CM

## 2018-09-06 DIAGNOSIS — R40.20 LOC (LOSS OF CONSCIOUSNESS): Primary | ICD-10-CM

## 2018-09-06 DIAGNOSIS — E78.2 MIXED HYPERLIPIDEMIA: ICD-10-CM

## 2018-09-06 PROCEDURE — 99999 PR PBB SHADOW E&M-EST. PATIENT-LVL III: CPT | Mod: PBBFAC,,, | Performed by: INTERNAL MEDICINE

## 2018-09-06 PROCEDURE — 99213 OFFICE O/P EST LOW 20 MIN: CPT | Mod: PBBFAC | Performed by: INTERNAL MEDICINE

## 2018-09-06 PROCEDURE — 99214 OFFICE O/P EST MOD 30 MIN: CPT | Mod: S$PBB,,, | Performed by: INTERNAL MEDICINE

## 2018-09-06 RX ORDER — METOPROLOL SUCCINATE 25 MG/1
25 TABLET, EXTENDED RELEASE ORAL DAILY
Qty: 90 TABLET | Refills: 1 | Status: ON HOLD | OUTPATIENT
Start: 2018-09-06 | End: 2018-09-17

## 2018-09-06 NOTE — PROGRESS NOTES
The above patient is wearing a cardiac event monitor for syncope. Patients monitor auto triggered an alert noting the presenting rhythm as Atrial Flutter with RVR and unsuccessful conversion pause, with rates of 158-166 beats per minute.Patients follow up is noted as Atrial Flutter with RVR, with rates of 146-160 beats per minute.Dr. Damico was notified of the above events. Will continue to monitor. Reports will be placed under this encounter for further review.

## 2018-09-06 NOTE — LETTER
September 6, 2018      Demian Davis MD  [address] [facility] [address]           [facility] - Cardiology  [address] [facility] [address]  [place] [address]  Phone: [phone]          Patient: Vel Ordonez   MR Number: [id number]   YOB: 1985   Date of Visit: 9/6/2018       Dear Sofia Jensen:    Thank you for referring Vel Ordonez to me for evaluation. Attached you will find relevant portions of my assessment and plan of care.    If you have questions, please do not hesitate to call me. I look forward to following Vel Ordonez along with you.    Sincerely,    Asher Lozano MD    Enclosure  CC:  Sofia Jensen PA-C  [facility]    If you would like to receive this communication electronically, please contact [email or web address] or [phone] to request more information on [employer] access.    For providers and/or their staff who would like to refer a patient to [facility], please contact us through our one-stop-shop provider referral line, [facility], at [phone].    If you feel you have received this communication in error or would no longer like to receive these types of communications, please e-mail [email or web address]

## 2018-09-06 NOTE — PROGRESS NOTES
CARDIOVASCULAR PROGRESS NOTE    REASON FOR CONSULT:   Vel Ordonez is a 32 y.o. male who presents for follow up of ?ACS, LOC.    EP: Ryan  HISTORY OF PRESENT ILLNESS:   The patient returns for follow-up.    In the interim since his last visit with me, he started wearing an event monitor.  Note has been made of episodic bradyarrhythmia, which did not appear to be associated with any symptoms, but he also recently had a tachyarrhythmia consistent with SVT and associated syncope.  He was subsequently seen at [facility] and EP recommended initiation of Toprol.  The patient has not had any syncopal episodes since then, but is feeling weak and having difficulties at work.  He otherwise has had no angina or dyspnea.  There has been no PND, orthopnea, or lower extremity edema.  He denies melena, hematuria, or claudicant symptoms.  Follow up with Dr. Davis electrophysiology is planned later this month.    CARDIOVASCULAR HISTORY:   ?ACS 7/2018 with normal cors on cath  PSVT/syncope    PAST MEDICAL HISTORY:     Past Medical History:   Diagnosis Date    Bipolar 1 disorder 2011    Hypertension        PAST SURGICAL HISTORY:   History reviewed. No pertinent surgical history.    ALLERGIES AND MEDICATION:   Review of patient's allergies indicates:  No Known Allergies  Previous Medications    ASPIRIN (ECOTRIN) 81 MG EC TABLET    Take 1 tablet (81 mg total) by mouth once daily.    ATORVASTATIN (LIPITOR) 80 MG TABLET    Take 1 tablet (80 mg total) by mouth once daily.    DIVALPROEX (DEPAKOTE) 250 MG EC TABLET    Take 500 mg by mouth 3 (three) times daily. 250mg daily  500mg afternoon and 500mg nightly    METOPROLOL SUCCINATE (TOPROL-XL) 25 MG 24 HR TABLET    Take 2 tablets (50 mg total) by mouth once daily.    ONDANSETRON (ZOFRAN-ODT) 4 MG TBDL    Take 1 tablet (4 mg total) by mouth every 8 (eight) hours as needed (nausea).    PANTOPRAZOLE (PROTONIX) 40 MG TABLET    Take 1 tablet (40 mg total) by mouth once daily.     QUETIAPINE (SEROQUEL) 100 MG TAB    Take by mouth every evening.       SOCIAL HISTORY:     Social History     Socioeconomic History    Marital status: Single     Spouse name: Not on file    Number of children: Not on file    Years of education: Not on file    Highest education level: Not on file   Social Needs    Financial resource strain: Not on file    Food insecurity - worry: Not on file    Food insecurity - inability: Not on file    Transportation needs - medical: Not on file    Transportation needs - non-medical: Not on file   Occupational History    Not on file   Tobacco Use    Smoking status: Never Smoker    Smokeless tobacco: Never Used   Substance and Sexual Activity    Alcohol use: No    Drug use: No    Sexual activity: Yes     Partners: Female   Other Topics Concern    Not on file   Social History Narrative    Not on file       FAMILY HISTORY:     Family History   Problem Relation Age of Onset    Cancer Mother     Heart disease Father     Diabetes Father        REVIEW OF SYSTEMS:   Review of Systems   Constitutional: Negative for chills, diaphoresis and fever.   HENT: Negative for nosebleeds.    Eyes: Negative for blurred vision, double vision and photophobia.   Respiratory: Negative for hemoptysis, shortness of breath and wheezing.    Cardiovascular: Negative for chest pain, palpitations, orthopnea, claudication, leg swelling and PND.   Gastrointestinal: Negative for abdominal pain, blood in stool, heartburn, melena, nausea and vomiting.   Genitourinary: Negative for flank pain and hematuria.   Musculoskeletal: Negative for falls, myalgias and neck pain.   Skin: Negative for rash.   Neurological: Positive for seizures and loss of consciousness. Negative for dizziness, weakness and headaches.   Endo/Heme/Allergies: Negative for polydipsia. Does not bruise/bleed easily.   Psychiatric/Behavioral: Negative for depression and memory loss. The patient is not nervous/anxious.   "      PHYSICAL EXAM:     Vitals:    09/06/18 0849   BP: 124/82   Pulse: 82   Resp: 15    Body mass index is 32.71 kg/m².  Weight: 121.9 kg (268 lb 11.9 oz)   Height: 6' 4" (193 cm)     Physical Exam   Constitutional: He is oriented to person, place, and time. He appears well-developed and well-nourished. He is cooperative.  Non-toxic appearance. No distress.   HENT:   Head: Normocephalic and atraumatic.   Eyes: Conjunctivae and EOM are normal. Pupils are equal, round, and reactive to light. No scleral icterus.   Neck: Trachea normal and normal range of motion. Neck supple. Normal carotid pulses and no JVD present. Carotid bruit is not present. No neck rigidity. No tracheal deviation and no edema present. No thyromegaly present.   Cardiovascular: Normal rate, regular rhythm, S1 normal and S2 normal. PMI is not displaced. Exam reveals no gallop and no friction rub.   No murmur heard.  Pulses:       Carotid pulses are 2+ on the right side, and 2+ on the left side.  Pulmonary/Chest: Effort normal and breath sounds normal. No stridor. No respiratory distress. He has no wheezes. He has no rales. He exhibits no tenderness.   Abdominal: Soft. He exhibits no distension. There is no hepatosplenomegaly.   Musculoskeletal: He exhibits no edema or tenderness.   Feet:   Right Foot:   Skin Integrity: Negative for ulcer.   Left Foot:   Skin Integrity: Negative for ulcer.   Neurological: He is alert and oriented to person, place, and time. No cranial nerve deficit.   Skin: Skin is warm and dry. No rash noted. No erythema.   Psychiatric: He has a normal mood and affect. His speech is normal and behavior is normal.   Vitals reviewed.      DATA:   EKG: (personally reviewed tracing)  7/17/18 SR 87, IRBBB    Laboratory:  CBC:  Recent Labs   Lab  09/01/18   1643  09/02/18   0625  09/05/18   0026   WHITE BLOOD CELL COUNT  14.40 H  9.92  15.77 H   HEMOGLOBIN  15.3  14.6  15.6   HEMATOCRIT  45.9  45.9  47.3   PLATELETS  339  293  312 "       CHEMISTRIES:  Recent Labs   Lab  07/18/18   0153   09/01/18   1643  09/02/18   0625  09/05/18   0026   GLUCOSE  89   < >  109  91  91   SODIUM  144   < >  143  144  142   POTASSIUM  4.0   < >  3.9  3.7  3.9   BUN BLD  14   < >  19  20  20   CREATININE  1.0   < >  1.0  0.8  0.9   EGFR IF   >60   < >  >60.0  >60.0  >60.0   EGFR IF NON-  >60   < >  >60.0  >60.0  >60.0   CALCIUM  9.2   < >  9.3  9.2  9.9   MAGNESIUM  2.3   --   2.2  2.4   --     < > = values in this interval not displayed.       CARDIAC BIOMARKERS:  Recent Labs   Lab  07/17/18 2140 07/20/18 2322 07/21/18   0608  09/01/18   1643  09/05/18   0026   CPK  112   --   98   --    --    --    TROPONIN I   --    < >  0.033 H  0.027 H  <0.006  <0.006    < > = values in this interval not displayed.       COAGS:  Recent Labs   Lab  07/18/18   0808  07/20/18   2322 09/05/18   0026   INR  1.0  1.1  0.9       LIPIDS/LFTS:  Recent Labs   Lab  07/18/18   0808   08/11/18 2127 09/01/18 1643 09/05/18   0026   CHOLESTEROL  129   --    --    --    --    TRIGLYCERIDES  156 H   --    --    --    --    HDL  20 L   --    --    --    --    LDL CHOLESTEROL  77.8   --    --    --    --    NON-HDL CHOLESTEROL  109   --    --    --    --    AST   --    < >  42  30  25   ALT   --    < >  54 H  63 H  60 H    < > = values in this interval not displayed.     Lab Results   Component Value Date    TSH 4.810 (H) 09/05/2018     Free T4 0.80 (9/5/18)    Cardiovascular Testing:  Echo 9/2/18    1 - Upper limit of normal left ventricular enlargement.     2 - Normal left ventricular systolic function (EF 55-60%).     3 - No wall motion abnormalities.     4 - Normal left ventricular diastolic function.     5 - Normal right ventricular systolic function .     6 - Moderate left atrial enlargement.     7 - Trivial mitral regurgitation.     8 - Trivial tricuspid regurgitation.     9 - The estimated PA systolic pressure is 17 mmHg.     Cath  7/18/18  LVEDP: 12mmHg  LVEF: 55% by echo  Wall Motion: normal by echo  Dominance: Left  LM: normal  LAD: normal  Ramus: normal  LCx: dom, normal  RCA: nondom, normal  Hemostasis:  R Radial band  Impression:  ACS  Normal cors/LV fxn  R rad vasband  Plan:  Cont med rx  ASA 81mg qd  Plavix 75mg qd for 1 year (thru 7/2018)  BBl/Statin +/- ACEi  Home today  Follow up with Dr. Lozano 2-3 weeks    ASSESSMENT:   # ?ACS, normal cors on cath 7/2018  # HLP on atorva 80mg  # recurrent syncope, with PSVT on EVM.  Pt now with generalized weakness on Toprol XL 50mg qd.  # BMI 33, stable vs last OV    PLAN:   Cont med rx  Dec toprol XL 25mg qd  Cont EVM  Obtain EVM prelim report  Follow up with Dr. Davis (EPS) planned 9/21/18  Driving restriction, pt otherwise cleared to return to work  Check lipids/LFT mid Oct 2018  RTC 1 month    Asher Lozano MD, FACC

## 2018-09-07 ENCOUNTER — PATIENT MESSAGE (OUTPATIENT)
Dept: CARDIOLOGY | Facility: CLINIC | Age: 33
End: 2018-09-07

## 2018-09-07 ENCOUNTER — TELEPHONE (OUTPATIENT)
Dept: ELECTROPHYSIOLOGY | Facility: CLINIC | Age: 33
End: 2018-09-07

## 2018-09-07 NOTE — TELEPHONE ENCOUNTER
Spoke to patient today and we rescheduled him to be sooner at the [facility] location , Per Dr. Lozano.

## 2018-09-07 NOTE — TELEPHONE ENCOUNTER
----- Message from Saima Arias RN sent at 9/7/2018 10:32 AM CDT -----  Dr Lozano would like this Pt to be seen by Dr Ryan barriga. He had syncope while wearing 30 day monitor. Thanks

## 2018-09-10 ENCOUNTER — OFFICE VISIT (OUTPATIENT)
Dept: CARDIOLOGY | Facility: CLINIC | Age: 33
End: 2018-09-10
Payer: MEDICAID

## 2018-09-10 VITALS
HEART RATE: 61 BPM | DIASTOLIC BLOOD PRESSURE: 75 MMHG | HEIGHT: 75 IN | SYSTOLIC BLOOD PRESSURE: 111 MMHG | BODY MASS INDEX: 33.69 KG/M2 | WEIGHT: 271 LBS

## 2018-09-10 DIAGNOSIS — I47.10 PAROXYSMAL SVT (SUPRAVENTRICULAR TACHYCARDIA): ICD-10-CM

## 2018-09-10 DIAGNOSIS — R55 SYNCOPE, CARDIOGENIC: ICD-10-CM

## 2018-09-10 DIAGNOSIS — F31.9 BIPOLAR 1 DISORDER: Chronic | ICD-10-CM

## 2018-09-10 DIAGNOSIS — R40.20 LOC (LOSS OF CONSCIOUSNESS): Primary | ICD-10-CM

## 2018-09-10 PROCEDURE — 99999 PR PBB SHADOW E&M-EST. PATIENT-LVL III: CPT | Mod: PBBFAC,,, | Performed by: INTERNAL MEDICINE

## 2018-09-10 PROCEDURE — 99213 OFFICE O/P EST LOW 20 MIN: CPT | Mod: PBBFAC,PO | Performed by: INTERNAL MEDICINE

## 2018-09-10 PROCEDURE — 93270 REMOTE 30 DAY ECG REV/REPORT: CPT | Mod: PBBFAC | Performed by: INTERNAL MEDICINE

## 2018-09-10 PROCEDURE — 99215 OFFICE O/P EST HI 40 MIN: CPT | Mod: S$PBB,,, | Performed by: INTERNAL MEDICINE

## 2018-09-10 NOTE — PROGRESS NOTES
Subjective:    Patient ID:  Vel Ordonez is a 32 y.o. male who presents for evaluation of Loss of Consciousness      HPI   32 y.o. M  bipolar d/o on meds  HTN on meds    Went to hospital due to syncope associated with NCT on event monitor.  Events: 8/24, syncope. Noise on monitor.  9/1: SVT a/w syncope.  9/4: NCT with some irregularity. f/u: -160 bpm.  Due to scheduling, sent home with BB. Since, no recurrence... but does feel sluggish.    echo 9/18: 55-60% LVEF    My interpretation of today's ECG is SB 55 bpm. Incomplete RBBB.     Review of Systems   Constitution: Positive for malaise/fatigue. Negative for weakness.   HENT: Negative.  Negative for ear pain and tinnitus.    Eyes: Negative for blurred vision.   Cardiovascular: Negative.  Negative for chest pain, dyspnea on exertion, near-syncope, palpitations and syncope.   Respiratory: Negative.  Negative for shortness of breath.    Endocrine: Negative.  Negative for polyuria.   Hematologic/Lymphatic: Does not bruise/bleed easily.   Skin: Negative.  Negative for rash.   Musculoskeletal: Negative.  Negative for joint pain and muscle weakness.   Gastrointestinal: Negative.  Negative for abdominal pain and change in bowel habit.   Genitourinary: Negative for frequency.   Neurological: Negative.  Negative for dizziness.   Psychiatric/Behavioral: Negative.  Negative for depression. The patient is not nervous/anxious.    Allergic/Immunologic: Negative for environmental allergies.        Objective:    Physical Exam   Constitutional: He is oriented to person, place, and time. He appears well-developed and well-nourished.   HENT:   Head: Normocephalic and atraumatic.   Eyes: Conjunctivae, EOM and lids are normal. No scleral icterus.   Neck: Normal range of motion. No JVD present. No tracheal deviation present. No thyromegaly present.   Cardiovascular: Regular rhythm, normal heart sounds and intact distal pulses.  No extrasystoles are present. Bradycardia  present. PMI is not displaced. Exam reveals no gallop and no friction rub.   No murmur heard.  Pulses:       Radial pulses are 2+ on the right side, and 2+ on the left side.   Pulmonary/Chest: Effort normal and breath sounds normal. No accessory muscle usage. No tachypnea. No respiratory distress. He has no wheezes. He has no rales.   Abdominal: Soft. Bowel sounds are normal. He exhibits no distension. There is no hepatosplenomegaly. There is no tenderness.   Musculoskeletal: Normal range of motion. He exhibits no edema.   Neurological: He is alert and oriented to person, place, and time. He has normal reflexes. He exhibits normal muscle tone.   Skin: Skin is warm and dry. No rash noted.   Psychiatric: He has a normal mood and affect. His behavior is normal.   Nursing note and vitals reviewed.        Assessment:       1. LOC (loss of consciousness)    2. Bipolar 1 disorder    3. Syncope, cardiogenic    4. Paroxysmal SVT (supraventricular tachycardia)         Plan:       Discussed with patient basic cardiac electrophysiology and in general possible mechanisms of SVT, including AVNRT, AVRT, AT, AFL.  Syncope episodes may or may not have been caused by his NCT. Discussed that Rx of SVT, though advised, may or may not alleviate LOC episodes.    I spent about a half hour discussing the nature of EP study and ablation, including possible transseptal puncture. We discused risks and benefits at length. Our discussion included, but was not limited to the risk of death, infection, bleeding, stroke, MI, cardiac perforation, embolism, cardiac tamponade, skin burns, and other organic injury including the possibility for need for surgery or pacemaker implantation.  I discussed with patient risks, indications, benefits, and alternatives of the planned procedure. All questions were answered. Patient understands and wishes to proceed.    No BB x2 days preprocedure.

## 2018-09-11 ENCOUNTER — TELEPHONE (OUTPATIENT)
Dept: ELECTROPHYSIOLOGY | Facility: CLINIC | Age: 33
End: 2018-09-11

## 2018-09-11 PROCEDURE — 93272 ECG/REVIEW INTERPRET ONLY: CPT | Mod: ,,, | Performed by: INTERNAL MEDICINE

## 2018-09-13 DIAGNOSIS — I49.9 CARDIAC ARRHYTHMIA, UNSPECIFIED CARDIAC ARRHYTHMIA TYPE: Primary | ICD-10-CM

## 2018-09-14 ENCOUNTER — PATIENT MESSAGE (OUTPATIENT)
Dept: ELECTROPHYSIOLOGY | Facility: CLINIC | Age: 33
End: 2018-09-14

## 2018-09-14 NOTE — PROGRESS NOTES
ABLATION EDUCATION CHECKLIST        DAY OF PROCEDURE:    9/17/18  @ 7 AM - ABLATION  Report to Cardiology Waiting Room on 3rd floor of the Hospital    · Do not eat or drink anything after: 12 mn on the night before your procedure  · Please do not wear makeup (especially mascara) when arriving for your procedure    Medications:   · HOLD METOPROLOL FOR 2 DAYS PRIOR TO PROCEDURE. LAST DOSE 9/14/18.  · You may take ALL other morning medications with a sip of water      Directions to the Cardiology Waiting Room  If you park in the Parking Garage:  Take elevators to the 2nd floor  Walk up ramp and turn right by [address] Elevators  Take elevator to the 3rd floor  Upon exiting the elevator, turn away from the clinic areas  Walk [address] around to front of hospital to area with windows overlooking [facility]  Check in at Reception Desk  OR  If family is dropping you off:  Have them drop you off at the front of the Hospital  (Near the ER, where all the flags are hung).  Take the E elevators to the 3rd floor.  Check in at the Reception Desk in the waiting room.        · You will be spending the night after your procedure.  · You will need someone to drive you home the day after your procedure.  · Your pain during your procedure will be managed by the anesthesia team.     Any need to reschedule or cancel procedures, or any questions regarding your procedures should be addressed directly with the Arrhythmia Department Nurses at the following phone number: [phone]

## 2018-09-17 ENCOUNTER — ANESTHESIA EVENT (OUTPATIENT)
Dept: MEDSURG UNIT | Facility: HOSPITAL | Age: 33
End: 2018-09-17
Payer: MEDICAID

## 2018-09-17 ENCOUNTER — HOSPITAL ENCOUNTER (OUTPATIENT)
Facility: HOSPITAL | Age: 33
Discharge: HOME | End: 2018-09-18
Attending: INTERNAL MEDICINE | Admitting: INTERNAL MEDICINE
Payer: MEDICAID

## 2018-09-17 ENCOUNTER — ANESTHESIA (OUTPATIENT)
Dept: MEDSURG UNIT | Facility: HOSPITAL | Age: 33
End: 2018-09-17
Payer: MEDICAID

## 2018-09-17 DIAGNOSIS — J00 ACUTE NASOPHARYNGITIS (COMMON COLD): ICD-10-CM

## 2018-09-17 DIAGNOSIS — I47.10 SVT (SUPRAVENTRICULAR TACHYCARDIA): ICD-10-CM

## 2018-09-17 DIAGNOSIS — I47.10 PAROXYSMAL SVT (SUPRAVENTRICULAR TACHYCARDIA): Primary | ICD-10-CM

## 2018-09-17 LAB
ANION GAP SERPL CALC-SCNC: 8 MMOL/L
APTT BLDCRRT: 25.1 SEC
BASOPHILS # BLD AUTO: 0.08 K/UL
BASOPHILS NFR BLD: 0.8 %
BUN SERPL-MCNC: 14 MG/DL
CALCIUM SERPL-MCNC: 9.2 MG/DL
CHLORIDE SERPL-SCNC: 106 MMOL/L
CO2 SERPL-SCNC: 23 MMOL/L
CREAT SERPL-MCNC: 0.7 MG/DL
DIFFERENTIAL METHOD: ABNORMAL
EOSINOPHIL # BLD AUTO: 0.3 K/UL
EOSINOPHIL NFR BLD: 3.2 %
ERYTHROCYTE [DISTWIDTH] IN BLOOD BY AUTOMATED COUNT: 13.7 %
EST. GFR  (AFRICAN AMERICAN): >60 ML/MIN/1.73 M^2
EST. GFR  (NON AFRICAN AMERICAN): >60 ML/MIN/1.73 M^2
GLUCOSE SERPL-MCNC: 110 MG/DL
HCT VFR BLD AUTO: 44 %
HGB BLD-MCNC: 14.4 G/DL
IMM GRANULOCYTES # BLD AUTO: 0.03 K/UL
IMM GRANULOCYTES NFR BLD AUTO: 0.3 %
INR PPP: 0.9
LYMPHOCYTES # BLD AUTO: 2.7 K/UL
LYMPHOCYTES NFR BLD: 28.1 %
MCH RBC QN AUTO: 30.4 PG
MCHC RBC AUTO-ENTMCNC: 32.7 G/DL
MCV RBC AUTO: 93 FL
MONOCYTES # BLD AUTO: 1.3 K/UL
MONOCYTES NFR BLD: 13.7 %
NEUTROPHILS # BLD AUTO: 5.3 K/UL
NEUTROPHILS NFR BLD: 53.9 %
NRBC BLD-RTO: 0 /100 WBC
PLATELET # BLD AUTO: 269 K/UL
PMV BLD AUTO: 10.9 FL
POTASSIUM SERPL-SCNC: 4 MMOL/L
PROTHROMBIN TIME: 9.7 SEC
RBC # BLD AUTO: 4.74 M/UL
SODIUM SERPL-SCNC: 137 MMOL/L
WBC # BLD AUTO: 9.75 K/UL

## 2018-09-17 PROCEDURE — 80048 BASIC METABOLIC PNL TOTAL CA: CPT

## 2018-09-17 PROCEDURE — 85610 PROTHROMBIN TIME: CPT

## 2018-09-17 PROCEDURE — 00537 ANES CARDIAC EP PROCEDURES: CPT | Performed by: INTERNAL MEDICINE

## 2018-09-17 PROCEDURE — 93623 PRGRMD STIMJ&PACG IV RX NFS: CPT | Mod: 26,,, | Performed by: INTERNAL MEDICINE

## 2018-09-17 PROCEDURE — 27100006 EP LAB PROCEDURE

## 2018-09-17 PROCEDURE — D9220A PRA ANESTHESIA: Mod: CRNA,,, | Performed by: NURSE ANESTHETIST, CERTIFIED REGISTERED

## 2018-09-17 PROCEDURE — D9220A PRA ANESTHESIA: Mod: ANES,,, | Performed by: ANESTHESIOLOGY

## 2018-09-17 PROCEDURE — 99220 PR INITIAL OBSERVATION CARE,LEVL III: CPT | Mod: ,,, | Performed by: INTERNAL MEDICINE

## 2018-09-17 PROCEDURE — 63600175 PHARM REV CODE 636 W HCPCS: Performed by: NURSE ANESTHETIST, CERTIFIED REGISTERED

## 2018-09-17 PROCEDURE — 37000009 HC ANESTHESIA EA ADD 15 MINS: Performed by: INTERNAL MEDICINE

## 2018-09-17 PROCEDURE — 25000003 PHARM REV CODE 250

## 2018-09-17 PROCEDURE — 37000008 HC ANESTHESIA 1ST 15 MINUTES: Performed by: INTERNAL MEDICINE

## 2018-09-17 PROCEDURE — C1731 CATH, EP, 20 OR MORE ELEC: HCPCS

## 2018-09-17 PROCEDURE — 63600175 PHARM REV CODE 636 W HCPCS

## 2018-09-17 PROCEDURE — 85730 THROMBOPLASTIN TIME PARTIAL: CPT

## 2018-09-17 PROCEDURE — 93621 COMP EP EVL L PAC&REC C SINS: CPT | Mod: 26,,, | Performed by: INTERNAL MEDICINE

## 2018-09-17 PROCEDURE — 93005 ELECTROCARDIOGRAM TRACING: CPT | Mod: 59

## 2018-09-17 PROCEDURE — 93613 INTRACARDIAC EPHYS 3D MAPG: CPT | Mod: ,,, | Performed by: INTERNAL MEDICINE

## 2018-09-17 PROCEDURE — 85025 COMPLETE CBC W/AUTO DIFF WBC: CPT

## 2018-09-17 PROCEDURE — 25000003 PHARM REV CODE 250: Performed by: NURSE ANESTHETIST, CERTIFIED REGISTERED

## 2018-09-17 PROCEDURE — 36415 COLL VENOUS BLD VENIPUNCTURE: CPT

## 2018-09-17 PROCEDURE — 93010 ELECTROCARDIOGRAM REPORT: CPT | Mod: 59,,, | Performed by: INTERNAL MEDICINE

## 2018-09-17 PROCEDURE — 63600175 PHARM REV CODE 636 W HCPCS: Performed by: ANESTHESIOLOGY

## 2018-09-17 PROCEDURE — 25000003 PHARM REV CODE 250: Performed by: NURSE PRACTITIONER

## 2018-09-17 PROCEDURE — 93653 COMPRE EP EVAL TX SVT: CPT | Mod: ,,, | Performed by: INTERNAL MEDICINE

## 2018-09-17 PROCEDURE — 93010 ELECTROCARDIOGRAM REPORT: CPT | Mod: ,,, | Performed by: INTERNAL MEDICINE

## 2018-09-17 RX ORDER — DIVALPROEX SODIUM 250 MG/1
500 TABLET, DELAYED RELEASE ORAL NIGHTLY
Status: DISCONTINUED | OUTPATIENT
Start: 2018-09-17 | End: 2018-09-18 | Stop reason: HOSPADM

## 2018-09-17 RX ORDER — ASPIRIN 81 MG/1
81 TABLET ORAL DAILY
Status: DISCONTINUED | OUTPATIENT
Start: 2018-09-17 | End: 2018-09-18 | Stop reason: HOSPADM

## 2018-09-17 RX ORDER — QUETIAPINE FUMARATE 25 MG/1
50 TABLET, FILM COATED ORAL NIGHTLY
Status: DISCONTINUED | OUTPATIENT
Start: 2018-09-17 | End: 2018-09-18 | Stop reason: HOSPADM

## 2018-09-17 RX ORDER — PROPOFOL 10 MG/ML
VIAL (ML) INTRAVENOUS
Status: DISCONTINUED | OUTPATIENT
Start: 2018-09-17 | End: 2018-09-17

## 2018-09-17 RX ORDER — OXYCODONE AND ACETAMINOPHEN 5; 325 MG/1; MG/1
1 TABLET ORAL EVERY 6 HOURS PRN
Status: DISCONTINUED | OUTPATIENT
Start: 2018-09-17 | End: 2018-09-18 | Stop reason: HOSPADM

## 2018-09-17 RX ORDER — SODIUM CHLORIDE 0.9 % (FLUSH) 0.9 %
3 SYRINGE (ML) INJECTION
Status: DISCONTINUED | OUTPATIENT
Start: 2018-09-17 | End: 2018-09-18 | Stop reason: HOSPADM

## 2018-09-17 RX ORDER — DIVALPROEX SODIUM 250 MG/1
250 TABLET, DELAYED RELEASE ORAL EVERY MORNING
Status: DISCONTINUED | OUTPATIENT
Start: 2018-09-18 | End: 2018-09-18 | Stop reason: HOSPADM

## 2018-09-17 RX ORDER — FENTANYL CITRATE 50 UG/ML
25 INJECTION, SOLUTION INTRAMUSCULAR; INTRAVENOUS EVERY 5 MIN PRN
Status: DISCONTINUED | OUTPATIENT
Start: 2018-09-17 | End: 2018-09-18 | Stop reason: HOSPADM

## 2018-09-17 RX ORDER — SODIUM CHLORIDE 9 MG/ML
INJECTION, SOLUTION INTRAVENOUS CONTINUOUS PRN
Status: DISCONTINUED | OUTPATIENT
Start: 2018-09-17 | End: 2018-09-17

## 2018-09-17 RX ORDER — ACETAMINOPHEN 325 MG/1
650 TABLET ORAL EVERY 6 HOURS PRN
Status: DISCONTINUED | OUTPATIENT
Start: 2018-09-17 | End: 2018-09-18 | Stop reason: HOSPADM

## 2018-09-17 RX ORDER — DIVALPROEX SODIUM 250 MG/1
500 TABLET, DELAYED RELEASE ORAL EVERY MORNING
Status: DISCONTINUED | OUTPATIENT
Start: 2018-09-18 | End: 2018-09-18 | Stop reason: HOSPADM

## 2018-09-17 RX ORDER — ATORVASTATIN CALCIUM 20 MG/1
80 TABLET, FILM COATED ORAL DAILY
Status: DISCONTINUED | OUTPATIENT
Start: 2018-09-17 | End: 2018-09-18 | Stop reason: HOSPADM

## 2018-09-17 RX ORDER — PANTOPRAZOLE SODIUM 40 MG/1
40 TABLET, DELAYED RELEASE ORAL DAILY
Status: DISCONTINUED | OUTPATIENT
Start: 2018-09-17 | End: 2018-09-18 | Stop reason: HOSPADM

## 2018-09-17 RX ORDER — PROPOFOL 10 MG/ML
VIAL (ML) INTRAVENOUS CONTINUOUS PRN
Status: DISCONTINUED | OUTPATIENT
Start: 2018-09-17 | End: 2018-09-17

## 2018-09-17 RX ORDER — MIDAZOLAM HYDROCHLORIDE 1 MG/ML
INJECTION, SOLUTION INTRAMUSCULAR; INTRAVENOUS
Status: DISCONTINUED | OUTPATIENT
Start: 2018-09-17 | End: 2018-09-17

## 2018-09-17 RX ADMIN — PROPOFOL 30 MG: 10 INJECTION, EMULSION INTRAVENOUS at 09:09

## 2018-09-17 RX ADMIN — PROPOFOL 20 MG: 10 INJECTION, EMULSION INTRAVENOUS at 09:09

## 2018-09-17 RX ADMIN — OXYCODONE HYDROCHLORIDE AND ACETAMINOPHEN 1 TABLET: 5; 325 TABLET ORAL at 01:09

## 2018-09-17 RX ADMIN — ISOPROTERENOL HYDROCHLORIDE 1 MCG/MIN: 0.2 INJECTION, SOLUTION INTRAMUSCULAR; INTRAVENOUS at 10:09

## 2018-09-17 RX ADMIN — MIDAZOLAM 2 MG: 1 INJECTION INTRAMUSCULAR; INTRAVENOUS at 09:09

## 2018-09-17 RX ADMIN — DIVALPROEX SODIUM 500 MG: 250 TABLET, DELAYED RELEASE ORAL at 08:09

## 2018-09-17 RX ADMIN — ASPIRIN 81 MG: 81 TABLET, COATED ORAL at 05:09

## 2018-09-17 RX ADMIN — PANTOPRAZOLE SODIUM 40 MG: 40 TABLET, DELAYED RELEASE ORAL at 02:09

## 2018-09-17 RX ADMIN — FENTANYL CITRATE 25 MCG: 50 INJECTION INTRAMUSCULAR; INTRAVENOUS at 12:09

## 2018-09-17 RX ADMIN — PROPOFOL 30 MG: 10 INJECTION, EMULSION INTRAVENOUS at 10:09

## 2018-09-17 RX ADMIN — SODIUM CHLORIDE: 0.9 INJECTION, SOLUTION INTRAVENOUS at 09:09

## 2018-09-17 RX ADMIN — PROPOFOL 150 MCG/KG/MIN: 10 INJECTION, EMULSION INTRAVENOUS at 09:09

## 2018-09-17 RX ADMIN — ATORVASTATIN CALCIUM 80 MG: 20 TABLET, FILM COATED ORAL at 08:09

## 2018-09-17 NOTE — PLAN OF CARE
Received report from JANINE Arguelles. Patient s/p ablation, AAOx3. VSS, no c/o pain or discomfort at this time, resp even and unlabored. Gauze/tegaderm dressing to roberto groin is CDI. No active bleeding. No hematoma noted. Post procedure protocol reviewed with patient and patient's family. Understanding verbalized. Family members at bedside. Nurse call bell within reach. Will continue to monitor per post procedure protocol.

## 2018-09-17 NOTE — PROGRESS NOTES
Dr. Young here seeing patient aware of patient shoulder discomfort and updated him on what I gave patient for shoulder discomfort.

## 2018-09-17 NOTE — ANESTHESIA PREPROCEDURE EVALUATION
09/17/2018  Vel Ordonez is a 32 y.o., male with bipolar disorder here for SVT ablation under MAC.  Echo earlier this month showed normal biventricular function    Past Medical History:   Diagnosis Date    Bipolar 1 disorder 2011    Hypertension     Supraventricular tachycardia      Lab Results   Component Value Date    WBC 15.77 (H) 09/05/2018    HGB 15.6 09/05/2018    HCT 47.3 09/05/2018    MCV 93 09/05/2018     09/05/2018       Chemistry        Component Value Date/Time     09/05/2018 0026    K 3.9 09/05/2018 0026     09/05/2018 0026    CO2 25 09/05/2018 0026    BUN 20 09/05/2018 0026    CREATININE 0.9 09/05/2018 0026    GLU 91 09/05/2018 0026        Component Value Date/Time    CALCIUM 9.9 09/05/2018 0026    ALKPHOS 77 09/05/2018 0026    AST 25 09/05/2018 0026    ALT 60 (H) 09/05/2018 0026    BILITOT 0.4 09/05/2018 0026    ESTGFRAFRICA >60.0 09/05/2018 0026    EGFRNONAA >60.0 09/05/2018 0026        Upper limit of normal left ventricular enlargement.     2 - Normal left ventricular systolic function (EF 55-60%).     3 - No wall motion abnormalities.     4 - Normal left ventricular diastolic function.     5 - Normal right ventricular systolic function .     6 - Moderate left atrial enlargement.     7 - Trivial mitral regurgitation.     8 - Trivial tricuspid regurgitation.     9 - The estimated PA systolic pressure is 17 mmHg.       Anesthesia Evaluation    I have reviewed the Patient Summary Reports.     I have reviewed the Medications.     Review of Systems      Physical Exam  General:  Well nourished, Obesity    Airway/Jaw/Neck:  Airway Findings: Mouth Opening: Normal Tongue: Normal  General Airway Assessment: Adult  Mallampati: II  TM Distance: Normal, at least 6 cm  Jaw/Neck Findings:  Neck ROM: Normal ROM      Dental:  Dental Findings: In tact     Chest/Lungs:  Chest/Lungs Findings: Clear to auscultation, Normal Respiratory Rate     Heart/Vascular:  Heart Findings: Rate: Normal  Rhythm: Regular Rhythm  Sounds: Normal        Mental Status:  Mental Status Findings:  Cooperative, Alert and Oriented         Anesthesia Plan  Type of Anesthesia, risks & benefits discussed:  Anesthesia Type:  general  Patient's Preference:   Intra-op Monitoring Plan: standard ASA monitors  Intra-op Monitoring Plan Comments:   Post Op Pain Control Plan: multimodal analgesia  Post Op Pain Control Plan Comments:   Induction:   IV  Beta Blocker:  Patient is on a Beta-Blocker and has received one dose within the past 24 hours (No further documentation required).       Informed Consent: Patient understands risks and agrees with Anesthesia plan.  Questions answered. Anesthesia consent signed with patient.  ASA Score: 2     Day of Surgery Review of History & Physical:    H&P update referred to the surgeon.         Ready For Surgery From Anesthesia Perspective.

## 2018-09-17 NOTE — NURSING TRANSFER
Nursing Transfer Note      9/17/2018     Transfer To: 315    Transfer via stretcher    Transfer with cardiac monitoring    Transported by adelfo     Medicines sent: none    Chart send with patient: Yes    Notified: reported to qasim campos     Patient reassessed at: see epic (date, time)    Upon arrival to floor: to room  No complaints no distress noted.

## 2018-09-17 NOTE — INTERVAL H&P NOTE
The patient has been examined and the H&P has been reviewed:    I concur with the findings and changes have been noted since the H&P was written: pt presented to the ED on 9/14/18 for tachycardia, resolved was sent home same day.   Pt presented for planned  EPS/Ablation. He held his metoprolol last dose was on Friday. He denies any fevers, malaise, bleeding. He denies any Hx TIA/CVA.       Review of Systems   Constitution: Negative for fevers/chills.   Positive for easily gets    weak and has malaise/fatigue.   Eyes: Negative for blurred vision.   Cardiovascular: Positive for palpitations. Negative for chest pain,  near-syncope, and syncope.   Respiratory:  Positive for shortness of breath   Endocrine:  Negative for polyuria.   Hematologic/Lymphatic: Negative for bruise/bleed easily.   Skin:  Negative for rash.   Musculoskeletal: Negative for joint pain and muscle weakness.   Extremity: Negative for swelling in the lower extremities.   Gastrointestinal:  Negative for abdominal pain and change in bowel habit.   Genitourinary: Negative for frequency.   Neurological:  Positive for occasional  dizziness.   Psychiatric/Behavioral:  Positive  for depression ( denies SI) , anxiety       PE:   General: Well developed, well nourished, No distress on room air   HEENT: Head is normocephalic, atraumatic;  Lungs: Clear to auscultation bilaterally.  No wheezing. Normal respiratory effort.  Cardiovascular:  S1 S2 Normal rate, regular rhythm and normal heart sounds.    PMI is not displaced  Extremities: No LE edema. Pulses 2+ and symmetric.   Abdomen: Abdomen is soft, non-tender non-distended with normal bowel sounds.  Skin: Skin color, texture, turgor normal. No rashes.  Musculoskeletal: normal range of motion   Neurologic: Normal strength and tone. No focal numbness or weakness.   Psychiatric: normal mood and affect.  behavior is normal. Alert and oriented X 3.  Labs obtained and in progress       PLAN:   EPS/Ablation for SVT    Anesthesia for sedation      Prior to procedure, extensive discussion with patient by STAFF regarding risks and benefits of   EPS/ ablation, and patient  would like to proceed.  The patient and girlfriend ( at bedside)  voices understanding and all questions have been answered. No further questions/concerns voiced at this time.      DIANA Sandoval-BC  Cardiology Electrophysiology  NP   [facility]    Attending: MD Demian Davis             Active Hospital Problems    Diagnosis  POA    SVT (supraventricular tachycardia) [I47.1]  Yes      Resolved Hospital Problems   No resolved problems to display.

## 2018-09-17 NOTE — TRANSFER OF CARE
"Anesthesia Transfer of Care Note    Patient: Vel Mead Ponthieux    Procedure(s) Performed: Procedure(s) (LRB):  ABLATION (N/A)    Patient location: PACU    Anesthesia Type: general    Transport from OR: Transported from OR on 6-10 L/min O2 by face mask with adequate spontaneous ventilation    Post pain: adequate analgesia    Post assessment: no apparent anesthetic complications    Post vital signs: stable    Level of consciousness: awake, oriented and alert    Nausea/Vomiting: no nausea/vomiting    Complications: none    Transfer of care protocol was followed      Last vitals:   Visit Vitals  BP (!) 145/87 (BP Location: Right arm, Patient Position: Lying)   Pulse 77   Temp 36.4 °C (97.6 °F) (Oral)   Resp 18   Ht 6' 4" (1.93 m)   Wt 118.4 kg (261 lb)   SpO2 96%   BMI 31.77 kg/m²     "

## 2018-09-17 NOTE — PROGRESS NOTES
Patient admitted to recovery see [facility] for complete assessment pacu bcg' s maintained safety measures verified patient instructed on pain scale and patient verbalized understanding. Also called for patients ekg and called jarrett patient's girlfriend per patient request and updated on patient location also patient c/o some right shoulder discomfort notified davian np new orders noted and will follow orders and monitor.

## 2018-09-18 VITALS
TEMPERATURE: 98 F | OXYGEN SATURATION: 92 % | HEIGHT: 76 IN | BODY MASS INDEX: 31.78 KG/M2 | SYSTOLIC BLOOD PRESSURE: 128 MMHG | HEART RATE: 80 BPM | DIASTOLIC BLOOD PRESSURE: 82 MMHG | RESPIRATION RATE: 18 BRPM | WEIGHT: 261 LBS

## 2018-09-18 PROCEDURE — 25000003 PHARM REV CODE 250: Performed by: INTERNAL MEDICINE

## 2018-09-18 PROCEDURE — 25000003 PHARM REV CODE 250: Performed by: NURSE PRACTITIONER

## 2018-09-18 RX ORDER — DIPHENHYDRAMINE HCL 25 MG
25 CAPSULE ORAL ONCE
Status: COMPLETED | OUTPATIENT
Start: 2018-09-18 | End: 2018-09-18

## 2018-09-18 RX ORDER — DIPHENHYDRAMINE HCL 25 MG
CAPSULE ORAL
Status: DISCONTINUED
Start: 2018-09-18 | End: 2018-09-18 | Stop reason: HOSPADM

## 2018-09-18 RX ADMIN — ASPIRIN 81 MG: 81 TABLET, COATED ORAL at 08:09

## 2018-09-18 RX ADMIN — DIVALPROEX SODIUM 250 MG: 250 TABLET, DELAYED RELEASE ORAL at 07:09

## 2018-09-18 RX ADMIN — OXYCODONE HYDROCHLORIDE AND ACETAMINOPHEN 1 TABLET: 5; 325 TABLET ORAL at 05:09

## 2018-09-18 RX ADMIN — ATORVASTATIN CALCIUM 80 MG: 20 TABLET, FILM COATED ORAL at 08:09

## 2018-09-18 RX ADMIN — DIPHENHYDRAMINE HYDROCHLORIDE 25 MG: 25 CAPSULE ORAL at 07:09

## 2018-09-18 RX ADMIN — PANTOPRAZOLE SODIUM 40 MG: 40 TABLET, DELAYED RELEASE ORAL at 08:09

## 2018-09-18 NOTE — ANESTHESIA POSTPROCEDURE EVALUATION
"Anesthesia Post Evaluation    Patient: Vel Mead Ponthieux    Procedure(s) Performed: Procedure(s) (LRB):  ABLATION (N/A)    Final Anesthesia Type: general  Patient location during evaluation: PACU  Patient participation: Yes- Able to Participate  Level of consciousness: awake and alert and oriented  Post-procedure vital signs: reviewed and stable  Pain management: adequate  Airway patency: patent  PONV status at discharge: No PONV  Anesthetic complications: no      Cardiovascular status: blood pressure returned to baseline  Respiratory status: unassisted, room air and spontaneous ventilation  Hydration status: euvolemic  Follow-up not needed.  Comments: Complains of right shoulder pain. Neurovascular exam intact. Unclear if this is a preexisting problem.  Reassurance given and recommend hot pack and ROM exercises.        Visit Vitals  /76 (BP Location: Right arm, Patient Position: Lying)   Pulse 74   Temp 36.8 °C (98.2 °F) (Oral)   Resp 18   Ht 6' 4" (1.93 m)   Wt 118.4 kg (261 lb)   SpO2 (!) 92%   BMI 31.77 kg/m²       Pain/Concepcion Score: Pain Assessment Performed: Yes (9/18/2018  6:00 AM)  Presence of Pain: denies (9/18/2018  6:00 AM)  Pain Rating Prior to Med Admin: 5 (9/18/2018  5:26 AM)  Pain Rating Post Med Admin: 5 (9/17/2018  1:45 PM)  Concepcion Score: 10 (9/17/2018  1:45 PM)        "

## 2018-09-18 NOTE — NURSING
Awake and alert.  Ambulating.  Pt states itching to arms is decreasing.  Bilateral groin dressings clean dry and intact.  VSS.  Fiance at bedside.  Will continue to monitor.

## 2018-09-18 NOTE — PLAN OF CARE
Problem: Patient Care Overview  Goal: Plan of Care Review  Outcome: Ongoing (interventions implemented as appropriate)  Bilateral groin sites remain CDI, no bleeding or hematoma noted.  Pt ambulated in hallway, tolerated well.  Will cont to monitor.

## 2018-09-18 NOTE — NURSING
IV d/c'd with cath tip intact.  Pt and fiance verbalized understanding of d/c instructions. Ambulated off unit for discharge home.

## 2018-09-18 NOTE — NURSING
Pt called and stated that he is itching from the pain pill he received.  Dr. Davis in room and verbal order received.  Will continue to monitor.  No hives noted.

## 2018-09-18 NOTE — DISCHARGE SUMMARY
[facility]  Cardiac Electrophysiology  Discharge Summary      Patient Name: Vel Ordonez  MRN: [id number]  Admission Date: 9/17/2018  Hospital Length of Stay: 0 days  Discharge Date and Time:  09/18/2018 8:57 AM  Attending Physician: Demian Davis MD    Discharging Provider: Silvia Rodriguez NP  Primary Care Physician: [facility]    HPI: 32 year old male with bipolar disorder, HTN, syncopal episodes associated with SVT. Pt presented for planned EPS / Ablation. Pt denies any acute symptoms on admit        Procedure(s) (LRB):  ABLATION (N/A)     Hospital Course: Pt underwent ablation  ( see procedure note for details)  , tolerated procedure with no acute complications. Monitored overnight, Bilateral groins with no bleeding or hematoma > ambulating  with no issues . Pt  Discontinue Metoprolol  Pt to follow up with SHERRI Gómez in 3 months  Discharge plans/instructions discussed with patient    who verbalized understanding  and agreement of plans of care.  No further questions or concerns  voiced at this time     Physical Exam:   Gen: no acute distress, pleasant patient answering questions appropriately  CVS: regular rate and rhythm, S1S2 normal, no murmurs/rubs/gallops  CHEST: clear to auscultation bilaterally, no wheezes/rales/ronchi  ABD: Soft, non-tender, nondistended, bowel sounds present  GROINS: bilateral  Soft, non tender, no bleeding no hematoma   Neurologic: Normal strength and tone. No focal numbness or weakness.   Psychiatric: normal mood and affect.  behavior is normal. Alert and oriented X 3.  EXT: No Edema       Final Active Diagnoses:    Diagnosis Date Noted POA    PRINCIPAL PROBLEM:  SVT (supraventricular tachycardia) [I47.1] 09/17/2018 Yes      Problems Resolved During this Admission:         Discharged Condition: good    Disposition: Home or Self Care    Follow Up:  Follow-up Information     Jeanette Gómez NP In 3 months.     Specialty:  Cardiology  Contact information:  [address]  [facility] [address]  [phone]                 Patient Instructions:      Diet Cardiac     Other restrictions (specify):   Order Comments: No lifting for more than 5 pounds in 1 week     Notify your health care provider if you experience any of the following:  temperature >100.4     Notify your health care provider if you experience any of the following:  persistent nausea and vomiting or diarrhea     Notify your health care provider if you experience any of the following:  severe uncontrolled pain     Notify your health care provider if you experience any of the following:  redness, tenderness, or signs of infection (pain, swelling, redness, odor or green/yellow discharge around incision site)     Notify your health care provider if you experience any of the following:  difficulty breathing or increased cough     Notify your health care provider if you experience any of the following:  severe persistent headache     Notify your health care provider if you experience any of the following:  worsening rash     Notify your health care provider if you experience any of the following:  persistent dizziness, light-headedness, or visual disturbances     Notify your health care provider if you experience any of the following:  increased confusion or weakness     Notify your health care provider if you experience any of the following:   Order Comments: For any concerning medical symptoms     No dressing needed     Medications:  Reconciled Home Medications:      Medication List      STOP taking these medications    metoprolol succinate 25 MG 24 hr tablet  Commonly known as:  TOPROL-XL        ASK your doctor about these medications    aspirin 81 MG EC tablet  Commonly known as:  ECOTRIN  Take 1 tablet (81 mg total) by mouth once daily.     atorvastatin 80 MG tablet  Commonly known as:  LIPITOR  Take 1 tablet (80 mg total) by mouth once daily.      divalproex 250 MG EC tablet  Commonly known as:  DEPAKOTE  Take 500 mg by mouth 3 (three) times daily. 250mg daily  500mg afternoon and 500mg nightly     ondansetron 4 MG Tbdl  Commonly known as:  ZOFRAN-ODT  Take 1 tablet (4 mg total) by mouth every 8 (eight) hours as needed (nausea).     pantoprazole 40 MG tablet  Commonly known as:  PROTONIX  Take 1 tablet (40 mg total) by mouth once daily.     QUEtiapine 100 MG Tab  Commonly known as:  SEROQUEL  Take by mouth every evening.              Silvia Rodriguez NP  Cardiac Electrophysiology  [facility]    STAFF MD Demian Davis

## 2018-09-21 ENCOUNTER — HOSPITAL ENCOUNTER (OUTPATIENT)
Dept: CARDIOLOGY | Facility: CLINIC | Age: 33
Discharge: HOME | End: 2018-09-21
Payer: MEDICAID

## 2018-09-21 DIAGNOSIS — I49.9 CARDIAC ARRHYTHMIA, UNSPECIFIED CARDIAC ARRHYTHMIA TYPE: ICD-10-CM

## 2018-09-21 PROCEDURE — 93005 ELECTROCARDIOGRAM TRACING: CPT | Mod: PBBFAC | Performed by: INTERNAL MEDICINE

## 2018-09-21 PROCEDURE — 93010 ELECTROCARDIOGRAM REPORT: CPT | Mod: S$PBB,,, | Performed by: INTERNAL MEDICINE

## 2018-10-10 ENCOUNTER — LAB VISIT (OUTPATIENT)
Dept: LAB | Facility: HOSPITAL | Age: 33
End: 2018-10-10
Attending: INTERNAL MEDICINE
Payer: MEDICAID

## 2018-10-10 DIAGNOSIS — E78.2 MIXED HYPERLIPIDEMIA: ICD-10-CM

## 2018-10-10 LAB
ALBUMIN SERPL BCP-MCNC: 3.7 G/DL
ALP SERPL-CCNC: 71 U/L
ALT SERPL W/O P-5'-P-CCNC: 33 U/L
AST SERPL-CCNC: 17 U/L
BILIRUB DIRECT SERPL-MCNC: 0.2 MG/DL
BILIRUB SERPL-MCNC: 0.6 MG/DL
CHOLEST SERPL-MCNC: 98 MG/DL
CHOLEST/HDLC SERPL: 3.5 {RATIO}
HDLC SERPL-MCNC: 28 MG/DL
HDLC SERPL: 28.6 %
LDLC SERPL CALC-MCNC: 52.6 MG/DL
NONHDLC SERPL-MCNC: 70 MG/DL
PROT SERPL-MCNC: 7.3 G/DL
TRIGL SERPL-MCNC: 87 MG/DL

## 2018-10-10 PROCEDURE — 36415 COLL VENOUS BLD VENIPUNCTURE: CPT

## 2018-10-10 PROCEDURE — 80076 HEPATIC FUNCTION PANEL: CPT

## 2018-10-10 PROCEDURE — 80061 LIPID PANEL: CPT

## 2018-10-15 ENCOUNTER — OFFICE VISIT (OUTPATIENT)
Dept: CARDIOLOGY | Facility: CLINIC | Age: 33
End: 2018-10-15
Payer: MEDICAID

## 2018-10-15 VITALS
BODY MASS INDEX: 33.82 KG/M2 | HEART RATE: 78 BPM | DIASTOLIC BLOOD PRESSURE: 82 MMHG | SYSTOLIC BLOOD PRESSURE: 120 MMHG | RESPIRATION RATE: 15 BRPM | OXYGEN SATURATION: 99 % | HEIGHT: 76 IN | WEIGHT: 277.75 LBS

## 2018-10-15 DIAGNOSIS — Z98.890 S/P RF ABLATION OPERATION FOR ARRHYTHMIA: ICD-10-CM

## 2018-10-15 DIAGNOSIS — I47.10 SVT (SUPRAVENTRICULAR TACHYCARDIA): Primary | ICD-10-CM

## 2018-10-15 DIAGNOSIS — E78.2 MIXED HYPERLIPIDEMIA: ICD-10-CM

## 2018-10-15 DIAGNOSIS — R55 SYNCOPE, CARDIOGENIC: ICD-10-CM

## 2018-10-15 DIAGNOSIS — Z86.79 S/P RF ABLATION OPERATION FOR ARRHYTHMIA: ICD-10-CM

## 2018-10-15 DIAGNOSIS — E66.9 NON MORBID OBESITY, UNSPECIFIED OBESITY TYPE: ICD-10-CM

## 2018-10-15 PROCEDURE — 99999 PR PBB SHADOW E&M-EST. PATIENT-LVL III: CPT | Mod: PBBFAC,,, | Performed by: INTERNAL MEDICINE

## 2018-10-15 PROCEDURE — 99214 OFFICE O/P EST MOD 30 MIN: CPT | Mod: S$PBB,,, | Performed by: INTERNAL MEDICINE

## 2018-10-15 PROCEDURE — 99213 OFFICE O/P EST LOW 20 MIN: CPT | Mod: PBBFAC | Performed by: INTERNAL MEDICINE

## 2018-10-15 NOTE — PROGRESS NOTES
CARDIOVASCULAR PROGRESS NOTE    REASON FOR CONSULT:   Vel Ordonez is a 33 y.o. male who presents for follow up of ?ACS, LOC.    EP: Ryan  HISTORY OF PRESENT ILLNESS:   The patient returns for follow-up.    In the interim since his last visit, he underwent SVT ablation by Dr. Davis.  He reports generally stable status without angina or dyspnea.  He has had no palpitations, lightheadedness, dizziness, or syncope.  He denies PND, orthopnea, or lower extremity edema. There has been no melena, hematuria, or claudicant symptoms.    CARDIOVASCULAR HISTORY:   ?ACS 7/2018 with normal cors on cath  PSVT/syncope s/p SVT ablation 9/2018 (Ryan)    PAST MEDICAL HISTORY:     Past Medical History:   Diagnosis Date    Bipolar 1 disorder 2011    Hypertension     Supraventricular tachycardia        PAST SURGICAL HISTORY:     Past Surgical History:   Procedure Laterality Date    ABLATION N/A 9/17/2018    Procedure: ABLATION;  Surgeon: Demian Davis MD;  Location: [facility] CATH LAB;  Service: Cardiology;  Laterality: N/A;  SVT, RFA, CHELSIE, MAC, DM, 3 PREP    ABLATION N/A 9/17/2018    Performed by Demian Davis MD at [facility] CATH LAB       ALLERGIES AND MEDICATION:   Review of patient's allergies indicates:  No Known Allergies       Medication List           Accurate as of 10/15/18  1:09 PM. If you have any questions, ask your nurse or doctor.               CONTINUE taking these medications    aspirin 81 MG EC tablet  Commonly known as:  ECOTRIN  Take 1 tablet (81 mg total) by mouth once daily.     atorvastatin 80 MG tablet  Commonly known as:  LIPITOR  Take 1 tablet (80 mg total) by mouth once daily.     divalproex 250 MG EC tablet  Commonly known as:  DEPAKOTE     ondansetron 4 MG Tbdl  Commonly known as:  ZOFRAN-ODT  Take 1 tablet (4 mg total) by mouth every 8 (eight) hours as needed (nausea).     pantoprazole 40 MG tablet  Commonly known as:  PROTONIX  Take 1 tablet (40 mg total) by mouth once daily.     QUEtiapine 100  MG Tab  Commonly known as:  SEROQUEL              SOCIAL HISTORY:     Social History     Socioeconomic History    Marital status: Single     Spouse name: Not on file    Number of children: Not on file    Years of education: Not on file    Highest education level: Not on file   Social Needs    Financial resource strain: Not on file    Food insecurity - worry: Not on file    Food insecurity - inability: Not on file    Transportation needs - medical: Not on file    Transportation needs - non-medical: Not on file   Occupational History    Not on file   Tobacco Use    Smoking status: Never Smoker    Smokeless tobacco: Never Used   Substance and Sexual Activity    Alcohol use: No    Drug use: No    Sexual activity: Yes     Partners: Female   Other Topics Concern    Not on file   Social History Narrative    Not on file       FAMILY HISTORY:     Family History   Problem Relation Age of Onset    Cancer Mother     Heart disease Father     Diabetes Father        REVIEW OF SYSTEMS:   Review of Systems   Constitutional: Negative for chills, diaphoresis and fever.   HENT: Negative for nosebleeds.    Eyes: Negative for blurred vision, double vision and photophobia.   Respiratory: Negative for hemoptysis, shortness of breath and wheezing.    Cardiovascular: Negative for chest pain, palpitations, orthopnea, claudication, leg swelling and PND.   Gastrointestinal: Negative for abdominal pain, blood in stool, heartburn, melena, nausea and vomiting.   Genitourinary: Negative for flank pain and hematuria.   Musculoskeletal: Negative for falls, myalgias and neck pain.   Skin: Negative for rash.   Neurological: Negative for dizziness, seizures, loss of consciousness, weakness and headaches.   Endo/Heme/Allergies: Negative for polydipsia. Does not bruise/bleed easily.   Psychiatric/Behavioral: Negative for depression and memory loss. The patient is not nervous/anxious.        PHYSICAL EXAM:     Vitals:    10/15/18 1301  "  BP: 120/82   Pulse: 78   Resp: 15    Body mass index is 33.81 kg/m².  Weight: 126 kg (277 lb 12.5 oz)   Height: 6' 4" (193 cm)     Physical Exam   Constitutional: He is oriented to person, place, and time. He appears well-developed and well-nourished. He is cooperative.  Non-toxic appearance. No distress.   HENT:   Head: Normocephalic and atraumatic.   Eyes: Conjunctivae and EOM are normal. Pupils are equal, round, and reactive to light. No scleral icterus.   Neck: Trachea normal and normal range of motion. Neck supple. Normal carotid pulses and no JVD present. Carotid bruit is not present. No neck rigidity. No tracheal deviation and no edema present. No thyromegaly present.   Cardiovascular: Normal rate, regular rhythm, S1 normal and S2 normal. PMI is not displaced. Exam reveals no gallop and no friction rub.   No murmur heard.  Pulses:       Carotid pulses are 2+ on the right side, and 2+ on the left side.  Pulmonary/Chest: Effort normal and breath sounds normal. No stridor. No respiratory distress. He has no wheezes. He has no rales. He exhibits no tenderness.   Abdominal: Soft. He exhibits no distension. There is no hepatosplenomegaly.   obese   Musculoskeletal: He exhibits no edema or tenderness.   Feet:   Right Foot:   Skin Integrity: Negative for ulcer.   Left Foot:   Skin Integrity: Negative for ulcer.   Neurological: He is alert and oriented to person, place, and time. No cranial nerve deficit.   Skin: Skin is warm and dry. No rash noted. No erythema.   Psychiatric: He has a normal mood and affect. His speech is normal and behavior is normal.   Vitals reviewed.      DATA:   EKG: (personally reviewed tracing)  7/17/18 SR 87, IRBBB    Laboratory:  CBC:  Recent Labs   Lab  09/02/18   0625  09/05/18   0026  09/17/18   0626   WHITE BLOOD CELL COUNT  9.92  15.77 H  9.75   HEMOGLOBIN  14.6  15.6  14.4   HEMATOCRIT  45.9  47.3  44.0   PLATELETS  293  312  269       CHEMISTRIES:  Recent Labs   Lab  07/18/18   0153 "   09/01/18   1643  09/02/18   0625  09/05/18   0026  09/17/18   0626   GLUCOSE  89   < >  109  91  91  110   SODIUM  144   < >  143  144  142  137   POTASSIUM  4.0   < >  3.9  3.7  3.9  4.0   BUN BLD  14   < >  19  20  20  14   CREATININE  1.0   < >  1.0  0.8  0.9  0.7   EGFR IF   >60   < >  >60.0  >60.0  >60.0  >60.0   EGFR IF NON-  >60   < >  >60.0  >60.0  >60.0  >60.0   CALCIUM  9.2   < >  9.3  9.2  9.9  9.2   MAGNESIUM  2.3   --   2.2  2.4   --    --     < > = values in this interval not displayed.       CARDIAC BIOMARKERS:  Recent Labs   Lab  07/17/18   2140   07/20/18 2322  07/21/18   0608  09/01/18 1643 09/05/18   0026   CPK  112   --   98   --    --    --    TROPONIN I   --    < >  0.033 H  0.027 H  <0.006  <0.006    < > = values in this interval not displayed.       COAGS:  Recent Labs   Lab  07/20/18 2322 09/05/18   0026  09/17/18   0626   INR  1.1  0.9  0.9       LIPIDS/LFTS:  Recent Labs   Lab  07/18/18   0808   09/01/18   1643  09/05/18   0026  10/10/18   0605   CHOLESTEROL  129   --    --    --   98 L   TRIGLYCERIDES  156 H   --    --    --   87   HDL  20 L   --    --    --   28 L   LDL CHOLESTEROL  77.8   --    --    --   52.6 L   NON-HDL CHOLESTEROL  109   --    --    --   70   AST   --    < >  30  25  17   ALT   --    < >  63 H  60 H  33    < > = values in this interval not displayed.     Lab Results   Component Value Date    TSH 4.810 (H) 09/05/2018     Free T4 0.80 (9/5/18)      Cardiovascular Testing:  SVT ablation 9/17/18 (Ryan)  A. Indication/Pre-Operative Diagnosis  The patient is a 32 year old male that was referred to the EP lab for SVT and Syncope.  B. Summary/Post-Operative Diagnosis   1.   Successful slow  pathway modification.   2.   Inducible typical AVNRT, s/p successful slow AV arianna pathway modification.   3.   Not inducible for any SVT after ablation, during iso and with up to DAES.   4.   Normal CSNRT.   5.   Normal response to CSM  bilaterally.    Echo 9/2/18    1 - Upper limit of normal left ventricular enlargement.     2 - Normal left ventricular systolic function (EF 55-60%).     3 - No wall motion abnormalities.     4 - Normal left ventricular diastolic function.     5 - Normal right ventricular systolic function .     6 - Moderate left atrial enlargement.     7 - Trivial mitral regurgitation.     8 - Trivial tricuspid regurgitation.     9 - The estimated PA systolic pressure is 17 mmHg.     Cath 7/18/18  LVEDP: 12mmHg  LVEF: 55% by echo  Wall Motion: normal by echo  Dominance: Left  LM: normal  LAD: normal  Ramus: normal  LCx: dom, normal  RCA: nondom, normal  Hemostasis:  R Radial band  Impression:  ACS  Normal cors/LV fxn  R rad vasband  Plan:  Cont med rx  ASA 81mg qd  Plavix 75mg qd for 1 year (thru 7/2018)  BBl/Statin +/- ACEi  Home today  Follow up with Dr. Lozano 2-3 weeks    ASSESSMENT:   # ?ACS, normal cors on cath 7/2018  # HLP on atorva 80mg  # recurrent syncope, with PSVT on EVM,  s/p SVT ablation 9/2018 (Dr. Davis)  # BMI 34, up 1 unit vs last OV    PLAN:   Cont med rx  Diet/exercise/weight loss  RTC 1 year    Asher Lozano MD, FACC

## 2018-12-01 ENCOUNTER — HOSPITAL ENCOUNTER (EMERGENCY)
Facility: HOSPITAL | Age: 33
Discharge: HOME | End: 2018-12-01
Attending: EMERGENCY MEDICINE
Payer: MEDICAID

## 2018-12-01 VITALS
DIASTOLIC BLOOD PRESSURE: 89 MMHG | SYSTOLIC BLOOD PRESSURE: 137 MMHG | BODY MASS INDEX: 34.82 KG/M2 | RESPIRATION RATE: 18 BRPM | HEART RATE: 73 BPM | WEIGHT: 280 LBS | OXYGEN SATURATION: 98 % | HEIGHT: 75 IN | TEMPERATURE: 98 F

## 2018-12-01 DIAGNOSIS — R07.9 CHEST PAIN: ICD-10-CM

## 2018-12-01 DIAGNOSIS — R07.89 CHEST WALL PAIN: Primary | ICD-10-CM

## 2018-12-01 LAB
ALBUMIN SERPL BCP-MCNC: 3.7 G/DL
ALP SERPL-CCNC: 70 U/L
ALT SERPL W/O P-5'-P-CCNC: 32 U/L
ANION GAP SERPL CALC-SCNC: 12 MMOL/L
AST SERPL-CCNC: 20 U/L
BASOPHILS # BLD AUTO: 0.06 K/UL
BASOPHILS NFR BLD: 0.5 %
BILIRUB SERPL-MCNC: 0.4 MG/DL
BUN SERPL-MCNC: 17 MG/DL
CALCIUM SERPL-MCNC: 9.3 MG/DL
CHLORIDE SERPL-SCNC: 107 MMOL/L
CO2 SERPL-SCNC: 22 MMOL/L
CREAT SERPL-MCNC: 1 MG/DL
DIFFERENTIAL METHOD: ABNORMAL
EOSINOPHIL # BLD AUTO: 0.3 K/UL
EOSINOPHIL NFR BLD: 2.3 %
ERYTHROCYTE [DISTWIDTH] IN BLOOD BY AUTOMATED COUNT: 12.7 %
EST. GFR  (AFRICAN AMERICAN): >60 ML/MIN/1.73 M^2
EST. GFR  (NON AFRICAN AMERICAN): >60 ML/MIN/1.73 M^2
GLUCOSE SERPL-MCNC: 97 MG/DL
HCT VFR BLD AUTO: 45.1 %
HGB BLD-MCNC: 15 G/DL
IMM GRANULOCYTES # BLD AUTO: 0.06 K/UL
IMM GRANULOCYTES NFR BLD AUTO: 0.5 %
LIPASE SERPL-CCNC: 22 U/L
LYMPHOCYTES # BLD AUTO: 3.1 K/UL
LYMPHOCYTES NFR BLD: 25.2 %
MCH RBC QN AUTO: 30.1 PG
MCHC RBC AUTO-ENTMCNC: 33.3 G/DL
MCV RBC AUTO: 91 FL
MONOCYTES # BLD AUTO: 1.8 K/UL
MONOCYTES NFR BLD: 14.3 %
NEUTROPHILS # BLD AUTO: 7.1 K/UL
NEUTROPHILS NFR BLD: 57.2 %
NRBC BLD-RTO: 0 /100 WBC
PLATELET # BLD AUTO: 312 K/UL
PMV BLD AUTO: 10.1 FL
POTASSIUM SERPL-SCNC: 4.1 MMOL/L
PROT SERPL-MCNC: 7.5 G/DL
RBC # BLD AUTO: 4.98 M/UL
SODIUM SERPL-SCNC: 141 MMOL/L
TROPONIN I SERPL DL<=0.01 NG/ML-MCNC: <0.006 NG/ML
VALPROATE SERPL-MCNC: 64.4 UG/ML
WBC # BLD AUTO: 12.36 K/UL

## 2018-12-01 PROCEDURE — 99285 EMERGENCY DEPT VISIT HI MDM: CPT | Mod: 25

## 2018-12-01 PROCEDURE — 99284 EMERGENCY DEPT VISIT MOD MDM: CPT | Mod: ,,, | Performed by: EMERGENCY MEDICINE

## 2018-12-01 PROCEDURE — 80164 ASSAY DIPROPYLACETIC ACD TOT: CPT

## 2018-12-01 PROCEDURE — 80053 COMPREHEN METABOLIC PANEL: CPT

## 2018-12-01 PROCEDURE — 85025 COMPLETE CBC W/AUTO DIFF WBC: CPT

## 2018-12-01 PROCEDURE — 83690 ASSAY OF LIPASE: CPT

## 2018-12-01 PROCEDURE — 84484 ASSAY OF TROPONIN QUANT: CPT

## 2018-12-01 PROCEDURE — 93010 ELECTROCARDIOGRAM REPORT: CPT | Mod: ,,, | Performed by: INTERNAL MEDICINE

## 2018-12-01 PROCEDURE — 93005 ELECTROCARDIOGRAM TRACING: CPT

## 2018-12-01 RX ORDER — DIVALPROEX SODIUM 500 MG/1
500 TABLET, FILM COATED, EXTENDED RELEASE ORAL 3 TIMES DAILY
COMMUNITY
Start: 2018-09-12 | End: 2019-02-18 | Stop reason: SDUPTHER

## 2018-12-02 NOTE — ED TRIAGE NOTES
Pt presents to the ED c/o of intermittent c/p for that past 3x days. Pt states c/p was abrupt and has had no relief. States quality to be chest tightness and rates chest pain 6/10. Pt also states numbness and tingling to left arm. Pt endorses slight SOB upon c/p. Pt had ablation done on 18. Denies palpitations. Pt's EKG in triage was NSR vent rate 73. Pt is AAOx4. Name and  checked and verified.

## 2018-12-02 NOTE — ED PROVIDER NOTES
Encounter Date: 12/1/2018    SCRIBE #1 NOTE: I, Hernando Bah, am scribing for, and in the presence of,  Opal Garcia MD. I have scribed the entire note.       History     Chief Complaint   Patient presents with    Chest Pain     Pt reports intermittent chest pain x 3 days with SOB.      33 y.o. male patient who presents to the Emergency Department for intermittent CP with onset 1 week ago. Sxs occurred while sitting. Pt denies any fever, N/V/D, diaphoresis, palpitations, extremity weakness/numbness, leg pain/swelling, dizziness, cough, and all other sxs at this time. Pt had cardiac ablation on 9/17/2018.  Dad w cabg sat 49 yo  Non smoker  No drugs  Pain mostly at rest.  rarely with exertion lasting couple of  seconds        The history is provided by the patient.     Review of patient's allergies indicates:  No Known Allergies  Past Medical History:   Diagnosis Date    Bipolar 1 disorder 2011    Hypertension     Supraventricular tachycardia      Past Surgical History:   Procedure Laterality Date    ABLATION N/A 9/17/2018    Procedure: ABLATION;  Surgeon: Demian Davis MD;  Location: [facility] CATH LAB;  Service: Cardiology;  Laterality: N/A;  SVT, RFA, CHELSIE, MAC, DM, 3 PREP    ABLATION N/A 9/17/2018    Performed by Demian Davis MD at [facility] CATH LAB     Family History   Problem Relation Age of Onset    Cancer Mother     Heart disease Father     Diabetes Father      Social History     Tobacco Use    Smoking status: Never Smoker    Smokeless tobacco: Never Used   Substance Use Topics    Alcohol use: No    Drug use: No     Review of Systems   Constitutional: Negative.  Negative for diaphoresis and fever.   HENT: Negative.  Negative for sore throat.    Eyes: Negative.    Respiratory: Negative.  Negative for cough.    Cardiovascular: Positive for chest pain. Negative for palpitations and leg swelling.   Gastrointestinal: Negative.  Negative for diarrhea, nausea and vomiting.   Endocrine: Negative.     Genitourinary: Negative.  Negative for dysuria.   Musculoskeletal: Negative.  Negative for back pain.   Skin: Negative.  Negative for rash.   Allergic/Immunologic: Negative.    Neurological: Negative.  Negative for dizziness and weakness.   Hematological: Negative.  Does not bruise/bleed easily.   Psychiatric/Behavioral: Negative.    All other systems reviewed and are negative.      Physical Exam     Initial Vitals [12/01/18 2021]   BP Pulse Resp Temp SpO2   137/89 82 18 98 °F (36.7 °C) 98 %      MAP       --         Physical Exam    Nursing note and vitals reviewed.  Constitutional: He appears well-developed and well-nourished.   HENT:   Head: Normocephalic and atraumatic.   Eyes: Pupils are equal, round, and reactive to light.   Neck: Normal range of motion.   Cardiovascular: Normal rate, regular rhythm, normal heart sounds and intact distal pulses.   Pulmonary/Chest: Breath sounds normal. No respiratory distress. He has no wheezes. He has no rhonchi. He has no rales. He exhibits no tenderness.   Abdominal: Soft. He exhibits no distension. There is no tenderness. There is no rebound and no guarding.   Musculoskeletal: Normal range of motion.   Neurological: He is alert and oriented to person, place, and time.   Skin: Skin is warm and dry.   Psychiatric: He has a normal mood and affect.         ED Course   Procedures  Labs Reviewed - No data to display  EKG Readings: (Independently Interpreted)   Normal sinus rhythm.  Incomplete right bundle branch block.  No ST-T.  No STEMI.  No significant change from previous       Imaging Results    None          Medical Decision Making:   History:   Old Medical Records: I decided to obtain old medical records.  Clinical Tests:   Lab Tests: Ordered and Reviewed  Radiological Study: Ordered and Reviewed  Medical Tests: Ordered and Reviewed            Scribe Attestation:   Scribe #1: I performed the above scribed service and the documentation accurately describes the services I  performed. I attest to the accuracy of the note.    Attending Attestation:             Attending ED Notes:   Patient with no STEMI.  Patient with seconds of substernal slight pain.  No diaphoresis or shortness of breath.  Very unlikely cardiac event.  He has no pain at this time.  Pain has been intermittent for the past week or so.  First troponin is negative.  Unlikely cardiac.  Perc and Wells are 0.  Will discharge this patient.  Patient understands he should follow up outpatient with his doctor.             Clinical Impression:  Chest wall pain   The encounter diagnosis was Chest pain.                             Opal Garcia MD  12/01/18 2205       Opal Garcia MD  12/01/18 7280

## 2018-12-02 NOTE — DISCHARGE INSTRUCTIONS
Tylenol Motrin as needed for pain.  Please return for any worsening or concerns.  Please follow up with your doctor.

## 2018-12-17 ENCOUNTER — HOSPITAL ENCOUNTER (OUTPATIENT)
Dept: CARDIOLOGY | Facility: CLINIC | Age: 33
Discharge: HOME | End: 2018-12-17
Payer: MEDICAID

## 2018-12-17 ENCOUNTER — CLINICAL SUPPORT (OUTPATIENT)
Dept: CARDIOLOGY | Facility: HOSPITAL | Age: 33
End: 2018-12-17
Attending: NURSE PRACTITIONER
Payer: MEDICAID

## 2018-12-17 ENCOUNTER — OFFICE VISIT (OUTPATIENT)
Dept: ELECTROPHYSIOLOGY | Facility: CLINIC | Age: 33
End: 2018-12-17
Payer: MEDICAID

## 2018-12-17 VITALS
WEIGHT: 286 LBS | SYSTOLIC BLOOD PRESSURE: 119 MMHG | HEIGHT: 75 IN | DIASTOLIC BLOOD PRESSURE: 81 MMHG | BODY MASS INDEX: 35.56 KG/M2 | HEART RATE: 61 BPM

## 2018-12-17 DIAGNOSIS — R55 SYNCOPE, CARDIOGENIC: ICD-10-CM

## 2018-12-17 DIAGNOSIS — I47.10 PAROXYSMAL SVT (SUPRAVENTRICULAR TACHYCARDIA): ICD-10-CM

## 2018-12-17 DIAGNOSIS — Z98.890 S/P RF ABLATION OPERATION FOR ARRHYTHMIA: Primary | ICD-10-CM

## 2018-12-17 DIAGNOSIS — Z86.79 S/P RF ABLATION OPERATION FOR ARRHYTHMIA: Primary | ICD-10-CM

## 2018-12-17 DIAGNOSIS — R00.2 PALPITATIONS: ICD-10-CM

## 2018-12-17 DIAGNOSIS — I49.9 CARDIAC ARRHYTHMIA, UNSPECIFIED CARDIAC ARRHYTHMIA TYPE: ICD-10-CM

## 2018-12-17 PROCEDURE — 99999 PR PBB SHADOW E&M-EST. PATIENT-LVL III: CPT | Mod: PBBFAC,,, | Performed by: NURSE PRACTITIONER

## 2018-12-17 PROCEDURE — 93271 ECG/MONITORING AND ANALYSIS: CPT

## 2018-12-17 PROCEDURE — 99214 OFFICE O/P EST MOD 30 MIN: CPT | Mod: S$PBB,,, | Performed by: NURSE PRACTITIONER

## 2018-12-17 PROCEDURE — 93010 ELECTROCARDIOGRAM REPORT: CPT | Mod: S$PBB,,, | Performed by: INTERNAL MEDICINE

## 2018-12-17 PROCEDURE — 93005 ELECTROCARDIOGRAM TRACING: CPT | Mod: PBBFAC | Performed by: INTERNAL MEDICINE

## 2018-12-17 PROCEDURE — 99213 OFFICE O/P EST LOW 20 MIN: CPT | Mod: PBBFAC,25 | Performed by: NURSE PRACTITIONER

## 2018-12-17 NOTE — PROGRESS NOTES
Mr. Ordonez is a patient of Dr. Davis and was last seen in clinic 9/10/2018.      Subjective:   Patient ID:  Vel Ordonez is a 33 y.o. male who presents for follow-up of Palpitations  .     HPI:    Mr. Ordonez is a 33 y.o. male with HTN, SVT, syncope here for follow up after ablation.     Background:    bipolar d/o on meds  HTN on meds  Went to hospital due to syncope associated with NCT on event monitor.  Events: 8/24, syncope. Noise on monitor.  9/1: SVT a/w syncope.  9/4: NCT with some irregularity. f/u: -160 bpm.  Due to scheduling, sent home with BB. Since, no recurrence... but does feel sluggish.    On 9/17/2018 he underwent successful slow pathway modification.    On 12/1/2018 he went to the ED with midsternal chest pain at rest. Troponins negative. It was deemed unlikely cardiac and he was sent home.     Update (12/17/2018):    Today he says that he continues to experience episodes of palpitations, light-headedness and chest discomfort that is similar to his previous symptoms that had been associated with SVT. Episodes do not occur every day but last about 5 minutes when they do. Not as intense as before his ablation. Mr. Ordonez denies chest pain with exertion, SOB, JUDD, or syncope.    I have personally reviewed the patient's EKG today, which shows sinus rhythm with incomplete RBBB at 61bpm. MD interval is 150. QTc is 402.    Recent Cardiac Tests:    2D Echo (9/2/2018):  CONCLUSIONS     1 - Upper limit of normal left ventricular enlargement.     2 - Normal left ventricular systolic function (EF 55-60%).     3 - No wall motion abnormalities.     4 - Normal left ventricular diastolic function.     5 - Normal right ventricular systolic function .     6 - Moderate left atrial enlargement.     7 - Trivial mitral regurgitation.     8 - Trivial tricuspid regurgitation.     9 - The estimated PA systolic pressure is 17 mmHg.     Current Outpatient Medications   Medication Sig    aspirin  "(ECOTRIN) 81 MG EC tablet Take 1 tablet (81 mg total) by mouth once daily.    atorvastatin (LIPITOR) 80 MG tablet Take 1 tablet (80 mg total) by mouth once daily.    divalproex ER (DEPAKOTE) 500 MG Tb24 500 mg 3 (three) times daily.     ondansetron (ZOFRAN-ODT) 4 MG TbDL Take 1 tablet (4 mg total) by mouth every 8 (eight) hours as needed (nausea).     No current facility-administered medications for this visit.      Review of Systems   Constitution: Negative for malaise/fatigue.   Cardiovascular: Positive for palpitations. Negative for chest pain, dyspnea on exertion, irregular heartbeat and leg swelling.   Respiratory: Negative for shortness of breath.    Hematologic/Lymphatic: Negative for bleeding problem.   Skin: Negative for rash.   Musculoskeletal: Negative for myalgias.   Gastrointestinal: Negative for hematemesis, hematochezia and nausea.   Genitourinary: Negative for hematuria.   Neurological: Positive for light-headedness.   Psychiatric/Behavioral: Negative for altered mental status.   Allergic/Immunologic: Negative for persistent infections.     Objective:        /81   Pulse 61   Ht 6' 3" (1.905 m)   Wt 129.7 kg (286 lb)   BMI 35.75 kg/m²     Physical Exam   Constitutional: He is oriented to person, place, and time. He appears well-developed and well-nourished.   HENT:   Head: Normocephalic.   Nose: Nose normal.   Eyes: Pupils are equal, round, and reactive to light.   Cardiovascular: Normal rate, regular rhythm, S1 normal and S2 normal.   No murmur heard.  Pulses:       Radial pulses are 2+ on the right side, and 2+ on the left side.   Pulmonary/Chest: Breath sounds normal. No respiratory distress.   Abdominal: Normal appearance.   Musculoskeletal: Normal range of motion. He exhibits no edema.   Neurological: He is alert and oriented to person, place, and time.   Skin: Skin is warm and dry. No erythema.   Psychiatric: He has a normal mood and affect. His speech is normal and behavior is " normal.   Nursing note and vitals reviewed.    Lab Results   Component Value Date     12/01/2018    K 4.1 12/01/2018    MG 2.4 09/02/2018    BUN 17 12/01/2018    CREATININE 1.0 12/01/2018    ALT 32 12/01/2018    AST 20 12/01/2018    HGB 15.0 12/01/2018    HCT 45.1 12/01/2018    TSH 4.810 (H) 09/05/2018    LDLCALC 52.6 (L) 10/10/2018       Recent Labs   Lab 07/18/18  0808 07/20/18  2322 09/05/18  0026 09/17/18  0626   INR 1.0 1.1 0.9 0.9       Assessment:     1. S/P RF ablation operation for arrhythmia    2. Syncope, cardiogenic    3. Paroxysmal SVT (supraventricular tachycardia)    4. Palpitations      Plan:     In summary, Mr. Ordonez is a 33 y.o. male with HTN, SVT, syncope here for follow up after ablation.   He continues to experience symptoms that are similar to those he experienced when having SVT, although not as intense.  ZIO patch not covered by his insurance at this time. Will obtain event monitor.    Event monitor.  RTC in 3 months.    *A copy of this note has been sent to Dr. Davis*    Follow-up in about 3 months (around 3/17/2019).    ------------------------------------------------------------------    ZABRINA Gotti, NP-C  [facility]

## 2018-12-18 DIAGNOSIS — I49.9 CARDIAC ARRHYTHMIA, UNSPECIFIED CARDIAC ARRHYTHMIA TYPE: Primary | ICD-10-CM

## 2018-12-19 ENCOUNTER — TELEPHONE (OUTPATIENT)
Dept: ELECTROPHYSIOLOGY | Facility: CLINIC | Age: 33
End: 2018-12-19

## 2018-12-19 DIAGNOSIS — I49.9 CARDIAC ARRHYTHMIA, UNSPECIFIED CARDIAC ARRHYTHMIA TYPE: Primary | ICD-10-CM

## 2018-12-19 NOTE — TELEPHONE ENCOUNTER
"Alert received for SVT on pt's event monitor. Pt currently back in NSR and feels OK. Palpitations felt during event similar to described during clinic visit.     Discussed with MD. Called pt back to reschedule follow up in March to next month. Instructed pt to continue to wear monitor and "bear down" during SVT episodes to try to release. Requested pt call back to clinic if any questions/concerns or if feels needs to be seen sooner. Will alert MD if more frequent events recorded on event monitor.     Pt verbalized understanding and denies any questions/concerns at this time.   "

## 2018-12-21 ENCOUNTER — TELEPHONE (OUTPATIENT)
Dept: ELECTROPHYSIOLOGY | Facility: CLINIC | Age: 33
End: 2018-12-21

## 2018-12-21 NOTE — TELEPHONE ENCOUNTER
Received alert from event monitor that Pt is in SVT. Tracings shown to Dr Jenkins. He ordered Cardizem 120 mg QD. Called Pt and he stated he could feel it a lil bit. He did bear down at the time. He stated it helped a little. Advised on Dr Jenkins starting him on CArdizem  Pt voiced understanding. Rx sent to [facility]

## 2018-12-23 ENCOUNTER — PATIENT MESSAGE (OUTPATIENT)
Dept: ELECTROPHYSIOLOGY | Facility: CLINIC | Age: 33
End: 2018-12-23

## 2018-12-23 RX ORDER — DILTIAZEM HYDROCHLORIDE 120 MG/1
1 CAPSULE, EXTENDED RELEASE ORAL DAILY
Qty: 30 CAPSULE | Refills: 11 | Status: SHIPPED | OUTPATIENT
Start: 2018-12-23 | End: 2019-01-25

## 2018-12-24 ENCOUNTER — TELEPHONE (OUTPATIENT)
Dept: CARDIOLOGY | Facility: HOSPITAL | Age: 33
End: 2018-12-24

## 2018-12-24 NOTE — TELEPHONE ENCOUNTER
Phoned patient to inform prescription fulfillment at his local [facility]. Patient acknowledged information given and new lead wires was also sent to patient from monitoring company.

## 2019-01-18 ENCOUNTER — TELEPHONE (OUTPATIENT)
Dept: ELECTROPHYSIOLOGY | Facility: CLINIC | Age: 34
End: 2019-01-18

## 2019-01-18 NOTE — TELEPHONE ENCOUNTER
----- Message from Royer Valdez sent at 1/18/2019  9:52 AM CST -----  Shauna   Mr. Ordonez strips is placed under the media tab.  ----- Message -----  From: Piper Beebe RN  Sent: 1/17/2019   4:53 PM  To: Royer Mehta was notified by [employer] that patient had AF on event monitor. Can you please pull the strips for Dr Mehta to review. There is nothing in the chart.   Thanks!!!!

## 2019-01-24 ENCOUNTER — TELEPHONE (OUTPATIENT)
Dept: ELECTROPHYSIOLOGY | Facility: CLINIC | Age: 34
End: 2019-01-24

## 2019-01-24 NOTE — TELEPHONE ENCOUNTER
Notified by Royer this morning that an alert was received from event monitor on Pt for new onset AFL. Pt not on anticoagulate. Discussed with Dr Davis and he would like the pt to be seen tomm. Called Pt to advise and he stated he already had an appt tomm.

## 2019-01-25 ENCOUNTER — HOSPITAL ENCOUNTER (OUTPATIENT)
Dept: CARDIOLOGY | Facility: CLINIC | Age: 34
Discharge: HOME | End: 2019-01-25
Payer: MEDICAID

## 2019-01-25 ENCOUNTER — OFFICE VISIT (OUTPATIENT)
Dept: ELECTROPHYSIOLOGY | Facility: CLINIC | Age: 34
End: 2019-01-25
Payer: COMMERCIAL

## 2019-01-25 VITALS
WEIGHT: 295.44 LBS | HEART RATE: 62 BPM | HEIGHT: 76 IN | DIASTOLIC BLOOD PRESSURE: 78 MMHG | SYSTOLIC BLOOD PRESSURE: 118 MMHG | BODY MASS INDEX: 35.98 KG/M2

## 2019-01-25 DIAGNOSIS — I47.10 PAROXYSMAL SVT (SUPRAVENTRICULAR TACHYCARDIA): Primary | ICD-10-CM

## 2019-01-25 DIAGNOSIS — I48.92 ATRIAL FLUTTER, UNSPECIFIED TYPE: ICD-10-CM

## 2019-01-25 DIAGNOSIS — Z98.890 S/P RF ABLATION OPERATION FOR ARRHYTHMIA: ICD-10-CM

## 2019-01-25 DIAGNOSIS — I49.9 CARDIAC ARRHYTHMIA, UNSPECIFIED CARDIAC ARRHYTHMIA TYPE: ICD-10-CM

## 2019-01-25 DIAGNOSIS — I48.92 ATRIAL FLUTTER, UNSPECIFIED TYPE: Primary | ICD-10-CM

## 2019-01-25 DIAGNOSIS — I47.10 SVT (SUPRAVENTRICULAR TACHYCARDIA): ICD-10-CM

## 2019-01-25 DIAGNOSIS — Z86.79 S/P RF ABLATION OPERATION FOR ARRHYTHMIA: ICD-10-CM

## 2019-01-25 PROCEDURE — 99215 OFFICE O/P EST HI 40 MIN: CPT | Mod: S$GLB,,, | Performed by: INTERNAL MEDICINE

## 2019-01-25 PROCEDURE — 99213 OFFICE O/P EST LOW 20 MIN: CPT | Mod: PBBFAC,25 | Performed by: INTERNAL MEDICINE

## 2019-01-25 PROCEDURE — 99215 PR OFFICE/OUTPT VISIT, EST, LEVL V, 40-54 MIN: ICD-10-PCS | Mod: S$GLB,,, | Performed by: INTERNAL MEDICINE

## 2019-01-25 PROCEDURE — 99999 PR PBB SHADOW E&M-EST. PATIENT-LVL III: ICD-10-PCS | Mod: PBBFAC,,, | Performed by: INTERNAL MEDICINE

## 2019-01-25 PROCEDURE — 93010 RHYTHM STRIP: ICD-10-PCS | Mod: S$PBB,,, | Performed by: INTERNAL MEDICINE

## 2019-01-25 PROCEDURE — 99999 PR PBB SHADOW E&M-EST. PATIENT-LVL III: CPT | Mod: PBBFAC,,, | Performed by: INTERNAL MEDICINE

## 2019-01-25 PROCEDURE — 93010 ELECTROCARDIOGRAM REPORT: CPT | Mod: S$PBB,,, | Performed by: INTERNAL MEDICINE

## 2019-01-25 PROCEDURE — 93005 ELECTROCARDIOGRAM TRACING: CPT | Mod: PBBFAC | Performed by: INTERNAL MEDICINE

## 2019-01-25 RX ORDER — DILTIAZEM HYDROCHLORIDE 180 MG/1
180 CAPSULE, COATED, EXTENDED RELEASE ORAL DAILY
Qty: 90 CAPSULE | Refills: 3 | Status: SHIPPED | OUTPATIENT
Start: 2019-01-25 | End: 2019-03-01 | Stop reason: SDUPTHER

## 2019-01-25 NOTE — PROGRESS NOTES
Mr. Ordonez is a patient of Dr. Davis and was last seen in clinic 9/10/2018.      Subjective:   Patient ID:  Vel Ordonez is a 33 y.o. male who presents for follow-up of Irregular Heart Beat  .     HPI:    Mr. Ordonez is a 33 y.o. male with HTN, SVT, syncope here for follow up after ablation.     Background:    bipolar d/o on meds  HTN on meds  Went to hospital due to syncope associated with NCT on event monitor.  Events: 8/24, syncope. Noise on monitor.  9/1: SVT a/w syncope.  9/4: NCT with some irregularity. f/u: -160 bpm.  Due to scheduling, sent home with BB. Since, no recurrence... but does feel sluggish.    On 9/17/2018 he underwent successful slow pathway modification. Not inducible for any arrhythmia afterward.  On 12/1/2018 he went to the ED with midsternal chest pain at rest. Troponins negative. It was deemed unlikely cardiac and he was sent home.     In 12/18, c/o new type of palpitations.  Today he says that he continues to experience episodes of palpitations, light-headedness and chest discomfort that is similar to his previous symptoms that had been associated with SVT. Episodes do not occur every day but last about 5 minutes when they do. Not as intense as before his ablation. Mr. Ordonez denies chest pain with exertion, SOB, JUDD, or syncope.  Event monitor shows SVT, likely all PAFL -- with some suggestive of typical AFL but others not so typical.    I have personally reviewed the patient's EKG today, which shows sinus rhythm with incomplete RBBB at 62 bpm.     Recent Cardiac Tests:    2D Echo (9/2/2018):  CONCLUSIONS     1 - Upper limit of normal left ventricular enlargement.     2 - Normal left ventricular systolic function (EF 55-60%).     3 - No wall motion abnormalities.     4 - Normal left ventricular diastolic function.     5 - Normal right ventricular systolic function .     6 - Moderate left atrial enlargement.     7 - Trivial mitral regurgitation.     8 - Trivial  "tricuspid regurgitation.     9 - The estimated PA systolic pressure is 17 mmHg.     Current Outpatient Medications   Medication Sig    aspirin (ECOTRIN) 81 MG EC tablet Take 1 tablet (81 mg total) by mouth once daily.    atorvastatin (LIPITOR) 80 MG tablet Take 1 tablet (80 mg total) by mouth once daily.    divalproex ER (DEPAKOTE) 500 MG Tb24 500 mg 3 (three) times daily.     apixaban (ELIQUIS) 5 mg Tab Take 1 tablet (5 mg total) by mouth 2 (two) times daily.    diltiaZEM (CARDIZEM CD) 180 MG 24 hr capsule Take 1 capsule (180 mg total) by mouth once daily.    ondansetron (ZOFRAN-ODT) 4 MG TbDL Take 1 tablet (4 mg total) by mouth every 8 (eight) hours as needed (nausea).     No current facility-administered medications for this visit.      Review of Systems   Constitution: Negative for malaise/fatigue.   Cardiovascular: Positive for palpitations. Negative for chest pain, dyspnea on exertion, irregular heartbeat and leg swelling.   Respiratory: Negative for shortness of breath.    Hematologic/Lymphatic: Negative for bleeding problem.   Skin: Negative for rash.   Musculoskeletal: Negative for myalgias.   Gastrointestinal: Negative for hematemesis, hematochezia and nausea.   Genitourinary: Negative for hematuria.   Neurological: Positive for light-headedness.   Psychiatric/Behavioral: Negative for altered mental status.   Allergic/Immunologic: Negative for persistent infections.     Objective:        /78   Pulse 62   Ht 6' 4" (1.93 m)   Wt 134 kg (295 lb 6.7 oz)   BMI 35.96 kg/m²     Physical Exam   Constitutional: He is oriented to person, place, and time. He appears well-developed and well-nourished.   HENT:   Head: Normocephalic and atraumatic.   Nose: Nose normal.   Eyes: Conjunctivae, EOM and lids are normal. Pupils are equal, round, and reactive to light. No scleral icterus.   Neck: Normal range of motion. No JVD present. No tracheal deviation present. No thyromegaly present.   Cardiovascular: " Normal rate, regular rhythm, S1 normal, S2 normal and normal heart sounds.  No extrasystoles are present. PMI is not displaced. Exam reveals no gallop and no friction rub.   No murmur heard.  Pulses:       Radial pulses are 2+ on the right side, and 2+ on the left side.   Pulmonary/Chest: Effort normal and breath sounds normal. No accessory muscle usage. No tachypnea. No respiratory distress. He has no wheezes. He has no rales.   Abdominal: Soft. Normal appearance and bowel sounds are normal. He exhibits no distension. There is no hepatosplenomegaly. There is no tenderness.   Musculoskeletal: Normal range of motion. He exhibits no edema.   Neurological: He is alert and oriented to person, place, and time. He has normal reflexes. He exhibits normal muscle tone.   Skin: Skin is warm and dry. No rash noted. No erythema.   Psychiatric: He has a normal mood and affect. His speech is normal and behavior is normal.   Nursing note and vitals reviewed.    Lab Results   Component Value Date     12/01/2018    K 4.1 12/01/2018    MG 2.4 09/02/2018    BUN 17 12/01/2018    CREATININE 1.0 12/01/2018    ALT 32 12/01/2018    AST 20 12/01/2018    HGB 15.0 12/01/2018    HCT 45.1 12/01/2018    TSH 4.810 (H) 09/05/2018    LDLCALC 52.6 (L) 10/10/2018       Recent Labs   Lab 07/18/18  0808 07/20/18  2322 09/05/18  0026 09/17/18  0626   INR 1.0 1.1 0.9 0.9       Assessment:     1. Paroxysmal SVT (supraventricular tachycardia)    2. SVT (supraventricular tachycardia)    3. S/P RF ablation operation for arrhythmia    4. Atrial flutter, unspecified type      Plan:     s/p successful ablation of AVNRT.  New dx of SVT: likely atrial flutter (suspect typical, but no 12-lead for review of it).    Discussed with patient basic cardiac electrophysiology and in general possible mechanisms of SVT, including AVNRT, AVRT, AT, AFL.  Discussed AFL and its basic pathophysiology, including its health implications and treatment options (rate vs. rhythm  control, meds vs. procedural/device treatment) as appropriate for the patient.    Xarelto given CHADS-VASc=1.   Increase diltiazem for better HR control.  Discussed the indications and possible side effects of the medications prescribed to the patient by me.    I spent about a half hour discussing the nature of EP study and ablation, including possible transseptal puncture. We discused risks and benefits at length. Our discussion included, but was not limited to the risk of death, infection, bleeding, stroke, MI, cardiac perforation, embolism, cardiac tamponade, skin burns, and other organic injury including the possibility for need for surgery or pacemaker implantation.  I discussed with patient risks, indications, benefits, and alternatives of the planned procedure. All questions were answered. Patient understands and wishes to proceed.  Will start with a typical AFL setup (halo, CS, His). Try to induce AT. Ablate what we find. If nothing inducible, will likely ablate the CTI. He understands all of this plan and agrees.  No xarelto x1 day before. If ECG day before shows NSR, no RITCHIE needed even if in AFL on morning of procedure.    Pt requests new psychiatrist; will refer.

## 2019-01-27 ENCOUNTER — PATIENT MESSAGE (OUTPATIENT)
Dept: ELECTROPHYSIOLOGY | Facility: CLINIC | Age: 34
End: 2019-01-27

## 2019-01-28 NOTE — PROGRESS NOTES
ABLATION EDUCATION CHECKLIST    1/30/19 @ 11 AM- EKG  This test is scheduled for you at [facility], 3rd floor [facility]    1/31/19 @ 8:30 AM- RITCHIE & Ablation  Report to Cardiology Waiting Room on 3rd floor of the Hospital    · Do not eat or drink anything after: 12 mn on the night before your procedure  · Please do not wear makeup (especially mascara) when arriving for your procedure    Medications:   · HOLD ELIQUIS (APIXABAN) for 1 day prior to your procedure. Last dose: evening of 1/29/19.   · You may take your other usual morning medications with a sip of water      Directions to the Cardiology Waiting Room  If you park in the Parking Garage:  Take elevators to the 2nd floor  Walk up ramp and turn right by Gold Elevators  Take elevator to the 3rd floor  Upon exiting the elevator, turn away from the clinic areas  Walk [address] around to front of hospital to area with windows overlooking [facility]  Check in at Reception Desk  OR  If family is dropping you off:  Have them drop you off at the front of the Hospital  (Near the ER, where all the flags are hung).  Take the E elevators to the 3rd floor.  Check in at the Reception Desk in the waiting room.    · You will be spending the night after your procedure.  · You will need someone to drive you home the day after your procedure.  · Your pain during your procedure will be managed by the anesthesia team.     Any need to reschedule or cancel procedures, or any questions regarding your procedures should be addressed directly with the Arrhythmia Department Nurses at the following phone number: [phone]

## 2019-01-30 ENCOUNTER — HOSPITAL ENCOUNTER (OUTPATIENT)
Dept: CARDIOLOGY | Facility: CLINIC | Age: 34
Discharge: HOME | End: 2019-01-30
Payer: MEDICAID

## 2019-01-30 ENCOUNTER — TELEPHONE (OUTPATIENT)
Dept: ELECTROPHYSIOLOGY | Facility: CLINIC | Age: 34
End: 2019-01-30

## 2019-01-30 DIAGNOSIS — I47.10 SVT (SUPRAVENTRICULAR TACHYCARDIA): ICD-10-CM

## 2019-01-30 PROCEDURE — 93010 RHYTHM STRIP: ICD-10-PCS | Mod: S$PBB,,, | Performed by: INTERNAL MEDICINE

## 2019-01-30 PROCEDURE — 93010 ELECTROCARDIOGRAM REPORT: CPT | Mod: S$PBB,,, | Performed by: INTERNAL MEDICINE

## 2019-01-30 PROCEDURE — 93005 ELECTROCARDIOGRAM TRACING: CPT | Mod: PBBFAC | Performed by: INTERNAL MEDICINE

## 2019-01-30 NOTE — TELEPHONE ENCOUNTER
Called pt to review pre op instructions for RFA tomorrow AM. Reminded pt to fast after midnight, ok to take usual meds with small sip of water (EXCEPT ELIQUIS, confirmed pt holding today and tomorrow AM), and to arrive for 8:30 AM to CWA. Pt verbalized understanding. Advised EKG NSR this AM, so per MD instructions, no RITCHIE needed tomorrow AM. Updated case notes and RITCHIE apt canceled.

## 2019-01-31 ENCOUNTER — ANESTHESIA (OUTPATIENT)
Dept: MEDSURG UNIT | Facility: HOSPITAL | Age: 34
End: 2019-01-31
Payer: COMMERCIAL

## 2019-01-31 ENCOUNTER — HOSPITAL ENCOUNTER (OUTPATIENT)
Facility: HOSPITAL | Age: 34
Discharge: HOME | End: 2019-02-01
Attending: INTERNAL MEDICINE | Admitting: INTERNAL MEDICINE
Payer: COMMERCIAL

## 2019-01-31 ENCOUNTER — ANESTHESIA EVENT (OUTPATIENT)
Dept: MEDSURG UNIT | Facility: HOSPITAL | Age: 34
End: 2019-01-31
Payer: COMMERCIAL

## 2019-01-31 DIAGNOSIS — I48.92 ATRIAL FLUTTER, UNSPECIFIED TYPE: Primary | ICD-10-CM

## 2019-01-31 DIAGNOSIS — I47.10 SVT (SUPRAVENTRICULAR TACHYCARDIA): ICD-10-CM

## 2019-01-31 DIAGNOSIS — I48.92 ATRIAL FLUTTER: ICD-10-CM

## 2019-01-31 DIAGNOSIS — I47.10 SVT (SUPRAVENTRICULAR TACHYCARDIA): Primary | ICD-10-CM

## 2019-01-31 LAB
ABO + RH BLD: NORMAL
BLD GP AB SCN CELLS X3 SERPL QL: NORMAL

## 2019-01-31 PROCEDURE — 93653 COMPRE EP EVAL TX SVT: CPT | Mod: ,,, | Performed by: INTERNAL MEDICINE

## 2019-01-31 PROCEDURE — 63600175 PHARM REV CODE 636 W HCPCS: Performed by: ANESTHESIOLOGY

## 2019-01-31 PROCEDURE — 37000008 HC ANESTHESIA 1ST 15 MINUTES: Performed by: INTERNAL MEDICINE

## 2019-01-31 PROCEDURE — 93010 ELECTROCARDIOGRAM REPORT: CPT | Mod: ,,, | Performed by: INTERNAL MEDICINE

## 2019-01-31 PROCEDURE — 00537 ANES CARDIAC EP PROCEDURES: CPT | Performed by: INTERNAL MEDICINE

## 2019-01-31 PROCEDURE — 93623 PR STIM/PACING HEART POST IV DRUG INFU: ICD-10-PCS | Mod: 26,,, | Performed by: INTERNAL MEDICINE

## 2019-01-31 PROCEDURE — C1730 CATH, EP, 19 OR FEW ELECT: HCPCS | Performed by: INTERNAL MEDICINE

## 2019-01-31 PROCEDURE — 37000009 HC ANESTHESIA EA ADD 15 MINS: Performed by: INTERNAL MEDICINE

## 2019-01-31 PROCEDURE — 93621 COMP EP EVL L PAC&REC C SINS: CPT | Mod: 26,,, | Performed by: INTERNAL MEDICINE

## 2019-01-31 PROCEDURE — C1893 INTRO/SHEATH, FIXED,NON-PEEL: HCPCS | Performed by: INTERNAL MEDICINE

## 2019-01-31 PROCEDURE — 93653 COMPRE EP EVAL TX SVT: CPT | Performed by: INTERNAL MEDICINE

## 2019-01-31 PROCEDURE — 93613 PR INTRACARD ELECTROPHYS 3-DIMENS MAPPING: ICD-10-PCS | Mod: ,,, | Performed by: INTERNAL MEDICINE

## 2019-01-31 PROCEDURE — 93621 COMP EP EVL L PAC&REC C SINS: CPT | Performed by: INTERNAL MEDICINE

## 2019-01-31 PROCEDURE — D9220A PRA ANESTHESIA: Mod: ,,, | Performed by: ANESTHESIOLOGY

## 2019-01-31 PROCEDURE — 93623 PRGRMD STIMJ&PACG IV RX NFS: CPT | Performed by: INTERNAL MEDICINE

## 2019-01-31 PROCEDURE — 93010 EKG 12-LEAD: ICD-10-PCS | Mod: ,,, | Performed by: INTERNAL MEDICINE

## 2019-01-31 PROCEDURE — 86901 BLOOD TYPING SEROLOGIC RH(D): CPT

## 2019-01-31 PROCEDURE — C1733 CATH, EP, OTHR THAN COOL-TIP: HCPCS | Performed by: INTERNAL MEDICINE

## 2019-01-31 PROCEDURE — 93623 PRGRMD STIMJ&PACG IV RX NFS: CPT | Mod: 26,,, | Performed by: INTERNAL MEDICINE

## 2019-01-31 PROCEDURE — 25000003 PHARM REV CODE 250: Performed by: INTERNAL MEDICINE

## 2019-01-31 PROCEDURE — 93005 ELECTROCARDIOGRAM TRACING: CPT

## 2019-01-31 PROCEDURE — 25000003 PHARM REV CODE 250: Performed by: NURSE PRACTITIONER

## 2019-01-31 PROCEDURE — 27201423 OPTIME MED/SURG SUP & DEVICES STERILE SUPPLY: Performed by: INTERNAL MEDICINE

## 2019-01-31 PROCEDURE — 93653 PR ELECTROPHYS EVAL, COMPREHEN, W/SUPRAVENT TACHYCARD TRMT: ICD-10-PCS | Mod: ,,, | Performed by: INTERNAL MEDICINE

## 2019-01-31 PROCEDURE — 63600175 PHARM REV CODE 636 W HCPCS: Performed by: NURSE ANESTHETIST, CERTIFIED REGISTERED

## 2019-01-31 PROCEDURE — 63600175 PHARM REV CODE 636 W HCPCS: Performed by: INTERNAL MEDICINE

## 2019-01-31 PROCEDURE — 93613 INTRACARDIAC EPHYS 3D MAPG: CPT | Mod: ,,, | Performed by: INTERNAL MEDICINE

## 2019-01-31 PROCEDURE — C1731 CATH, EP, 20 OR MORE ELEC: HCPCS | Performed by: INTERNAL MEDICINE

## 2019-01-31 PROCEDURE — D9220A PRA ANESTHESIA: ICD-10-PCS | Mod: ,,, | Performed by: ANESTHESIOLOGY

## 2019-01-31 PROCEDURE — 25000003 PHARM REV CODE 250: Performed by: NURSE ANESTHETIST, CERTIFIED REGISTERED

## 2019-01-31 PROCEDURE — C1894 INTRO/SHEATH, NON-LASER: HCPCS | Performed by: INTERNAL MEDICINE

## 2019-01-31 PROCEDURE — 93621: ICD-10-PCS | Mod: 26,,, | Performed by: INTERNAL MEDICINE

## 2019-01-31 PROCEDURE — 93613 INTRACARDIAC EPHYS 3D MAPG: CPT | Performed by: INTERNAL MEDICINE

## 2019-01-31 PROCEDURE — 25000003 PHARM REV CODE 250: Performed by: STUDENT IN AN ORGANIZED HEALTH CARE EDUCATION/TRAINING PROGRAM

## 2019-01-31 RX ORDER — SODIUM CHLORIDE 0.9 % (FLUSH) 0.9 %
3 SYRINGE (ML) INJECTION
Status: DISCONTINUED | OUTPATIENT
Start: 2019-01-31 | End: 2019-01-31 | Stop reason: HOSPADM

## 2019-01-31 RX ORDER — PROPOFOL 10 MG/ML
VIAL (ML) INTRAVENOUS
Status: DISCONTINUED | OUTPATIENT
Start: 2019-01-31 | End: 2019-01-31

## 2019-01-31 RX ORDER — SODIUM CHLORIDE 0.9 % (FLUSH) 0.9 %
5 SYRINGE (ML) INJECTION
Status: DISCONTINUED | OUTPATIENT
Start: 2019-01-31 | End: 2019-02-01

## 2019-01-31 RX ORDER — HYDROMORPHONE HYDROCHLORIDE 1 MG/ML
0.2 INJECTION, SOLUTION INTRAMUSCULAR; INTRAVENOUS; SUBCUTANEOUS EVERY 5 MIN PRN
Status: DISCONTINUED | OUTPATIENT
Start: 2019-01-31 | End: 2019-01-31 | Stop reason: HOSPADM

## 2019-01-31 RX ORDER — SODIUM CHLORIDE 9 MG/ML
INJECTION, SOLUTION INTRAVENOUS CONTINUOUS
Status: DISCONTINUED | OUTPATIENT
Start: 2019-01-31 | End: 2019-02-01

## 2019-01-31 RX ORDER — TRAMADOL HYDROCHLORIDE 50 MG/1
50 TABLET ORAL ONCE
Status: COMPLETED | OUTPATIENT
Start: 2019-01-31 | End: 2019-01-31

## 2019-01-31 RX ORDER — LIDOCAINE HYDROCHLORIDE 20 MG/ML
INJECTION, SOLUTION EPIDURAL; INFILTRATION; INTRACAUDAL; PERINEURAL
Status: DISCONTINUED | OUTPATIENT
Start: 2019-01-31 | End: 2019-01-31 | Stop reason: HOSPADM

## 2019-01-31 RX ORDER — FENTANYL CITRATE 50 UG/ML
INJECTION, SOLUTION INTRAMUSCULAR; INTRAVENOUS
Status: DISCONTINUED | OUTPATIENT
Start: 2019-01-31 | End: 2019-01-31

## 2019-01-31 RX ORDER — MIDAZOLAM HYDROCHLORIDE 1 MG/ML
INJECTION, SOLUTION INTRAMUSCULAR; INTRAVENOUS
Status: DISCONTINUED | OUTPATIENT
Start: 2019-01-31 | End: 2019-01-31

## 2019-01-31 RX ORDER — MEPERIDINE HYDROCHLORIDE 50 MG/ML
12.5 INJECTION INTRAMUSCULAR; INTRAVENOUS; SUBCUTANEOUS ONCE AS NEEDED
Status: DISCONTINUED | OUTPATIENT
Start: 2019-01-31 | End: 2019-01-31 | Stop reason: HOSPADM

## 2019-01-31 RX ORDER — HEPARIN SODIUM 200 [USP'U]/100ML
INJECTION, SOLUTION INTRAVENOUS
Status: DISCONTINUED | OUTPATIENT
Start: 2019-01-31 | End: 2019-02-01

## 2019-01-31 RX ORDER — LIDOCAINE HCL/PF 100 MG/5ML
SYRINGE (ML) INTRAVENOUS
Status: DISCONTINUED | OUTPATIENT
Start: 2019-01-31 | End: 2019-01-31

## 2019-01-31 RX ORDER — DIVALPROEX SODIUM 500 MG/1
500 TABLET, FILM COATED, EXTENDED RELEASE ORAL ONCE
Status: COMPLETED | OUTPATIENT
Start: 2019-01-31 | End: 2019-01-31

## 2019-01-31 RX ORDER — PROPOFOL 10 MG/ML
VIAL (ML) INTRAVENOUS CONTINUOUS PRN
Status: DISCONTINUED | OUTPATIENT
Start: 2019-01-31 | End: 2019-01-31

## 2019-01-31 RX ADMIN — MIDAZOLAM 2 MG: 1 INJECTION INTRAMUSCULAR; INTRAVENOUS at 11:01

## 2019-01-31 RX ADMIN — PROPOFOL 50 MG: 10 INJECTION, EMULSION INTRAVENOUS at 01:01

## 2019-01-31 RX ADMIN — HYDROMORPHONE HYDROCHLORIDE 0.2 MG: 1 INJECTION, SOLUTION INTRAMUSCULAR; INTRAVENOUS; SUBCUTANEOUS at 03:01

## 2019-01-31 RX ADMIN — SODIUM CHLORIDE: 0.9 INJECTION, SOLUTION INTRAVENOUS at 11:01

## 2019-01-31 RX ADMIN — HYDROMORPHONE HYDROCHLORIDE 0.2 MG: 1 INJECTION, SOLUTION INTRAMUSCULAR; INTRAVENOUS; SUBCUTANEOUS at 04:01

## 2019-01-31 RX ADMIN — TRAMADOL HYDROCHLORIDE 50 MG: 50 TABLET, FILM COATED ORAL at 07:01

## 2019-01-31 RX ADMIN — SODIUM CHLORIDE, SODIUM GLUCONATE, SODIUM ACETATE, POTASSIUM CHLORIDE, MAGNESIUM CHLORIDE, SODIUM PHOSPHATE, DIBASIC, AND POTASSIUM PHOSPHATE: .53; .5; .37; .037; .03; .012; .00082 INJECTION, SOLUTION INTRAVENOUS at 01:01

## 2019-01-31 RX ADMIN — ISOPROTERENOL HYDROCHLORIDE 1 MCG/MIN: 0.2 INJECTION, SOLUTION INTRAMUSCULAR; INTRAVENOUS at 01:01

## 2019-01-31 RX ADMIN — PROPOFOL 100 MCG/KG/MIN: 10 INJECTION, EMULSION INTRAVENOUS at 12:01

## 2019-01-31 RX ADMIN — FENTANYL CITRATE 50 MCG: 50 INJECTION, SOLUTION INTRAMUSCULAR; INTRAVENOUS at 01:01

## 2019-01-31 RX ADMIN — LIDOCAINE HYDROCHLORIDE 50 MG: 20 INJECTION, SOLUTION INTRAVENOUS at 12:01

## 2019-01-31 RX ADMIN — DIVALPROEX SODIUM 500 MG: 500 TABLET, FILM COATED, EXTENDED RELEASE ORAL at 11:01

## 2019-01-31 NOTE — ANESTHESIA PREPROCEDURE EVALUATION
01/31/2019  Vel Ordonez is a 33 y.o., male.    Anesthesia Evaluation    I have reviewed the Patient Summary Reports.    I have reviewed the Nursing Notes.   I have reviewed the Medications.     Review of Systems  Anesthesia Hx:  No problems with previous Anesthesia  Denies Family Hx of Anesthesia complications.   Denies Personal Hx of Anesthesia complications.   Cardiovascular:   Exercise tolerance: good CONCLUSIONS     1 - Upper limit of normal left ventricular enlargement.     2 - Normal left ventricular systolic function (EF 55-60%).     3 - No wall motion abnormalities.     4 - Normal left ventricular diastolic function.     5 - Normal right ventricular systolic function .     6 - Moderate left atrial enlargement.     7 - Trivial mitral regurgitation.     8 - Trivial tricuspid regurgitation.     9 - The estimated PA systolic pressure is 17 mmHg.  Functional Capacity good / => 4 METS      Patient Active Problem List   Diagnosis    Acute nasopharyngitis    Viral syndrome    Bipolar 1 disorder    Elevated troponin    Chest pain    Seizure    Mixed hyperlipidemia    Syncope, cardiogenic    Paroxysmal SVT (supraventricular tachycardia)    History of non-ST elevation myocardial infarction (NSTEMI)    LOC (loss of consciousness)    SVT (supraventricular tachycardia)    S/P RF ablation operation for arrhythmia    Non morbid obesity, unspecified obesity type    Atrial flutter         Physical Exam  General:  Well nourished, Obesity    Airway/Jaw/Neck:  Airway Findings: Mouth Opening: Normal Tongue: Normal  General Airway Assessment: Adult  Mallampati: II  TM Distance: Normal, at least 6 cm  Jaw/Neck Findings:  Neck ROM: Normal ROM      Dental:  Dental Findings: In tact    Chest/Lungs:  Chest/Lungs Findings: Clear to auscultation, Normal Respiratory Rate     Heart/Vascular:  Heart  Findings: Rate: Normal  Rhythm: Regular Rhythm  Sounds: Normal        Mental Status:  Mental Status Findings:  Cooperative, Alert and Oriented         Anesthesia Plan  Type of Anesthesia, risks & benefits discussed:  Anesthesia Type:  general  Patient's Preference: General  Intra-op Monitoring Plan: standard ASA monitors  Intra-op Monitoring Plan Comments:   Post Op Pain Control Plan: per primary service following discharge from PACU  Post Op Pain Control Plan Comments: Per primary service  Induction:   IV  Beta Blocker:  Patient is not currently on a Beta-Blocker (No further documentation required).       Informed Consent: Patient understands risks and agrees with Anesthesia plan.  Questions answered. Anesthesia consent signed with patient.  ASA Score: 3     Day of Surgery Review of History & Physical:    H&P update referred to the surgeon.         Ready For Surgery From Anesthesia Perspective.

## 2019-01-31 NOTE — OP NOTE
EP Post Procedure Note    Successful CTI RFA performed without immediate complications.    Plan  - Hold Eliquis  - Restart Cardizem in AM  - Monitor on telemetry, ECG post procedure  - Bed rest x 4 hours, q30 minute groin checks  - Advance diet as tolerated    Geovanny Viera MD, MPH  PGY-VI  Cardiovascular Disease Fellow

## 2019-01-31 NOTE — NURSING
Pt transferred to unit. Groin dressings CDI no drainage. Pt is instructed to lie flat until 2030.

## 2019-01-31 NOTE — PLAN OF CARE
POC reviewed with pt, acknowledged understanding. Pt AAO x 4. Pt remains free of falls/injuries. Pt on telemetry remains SR.  Pt tolerating clear liquid diet. Pt pain controlled with prescribed meds. Pt bilateral groin sites- CDI no hematoma present.  No acute events throughout shift. No distress noted, will continue to monitor.

## 2019-01-31 NOTE — TRANSFER OF CARE
"Anesthesia Transfer of Care Note    Patient: Vel Mead Ponthieux    Procedure(s) Performed: Procedure(s) (LRB):  ABLATION, ATRIAL TACHYCARDIA (N/A)    Patient location: PACU    Anesthesia Type: general    Transport from OR: Transported from OR on 6-10 L/min O2 by face mask with adequate spontaneous ventilation    Post pain: adequate analgesia    Post assessment: no apparent anesthetic complications and tolerated procedure well    Post vital signs: stable    Level of consciousness: awake    Nausea/Vomiting: no nausea/vomiting    Complications: none    Transfer of care protocol was followed      Last vitals:   Visit Vitals  /85 (BP Location: Right arm, Patient Position: Lying)   Pulse 75   Temp 36.6 °C (97.9 °F)   Resp 17   Ht 6' 4" (1.93 m)   Wt 127 kg (280 lb)   SpO2 98%   BMI 34.08 kg/m²     "

## 2019-01-31 NOTE — INTERVAL H&P NOTE
The patient has been examined and the H&P has been reviewed:    I concur with the findings and no changes have occurred since H&P was written.   Reports last dose of Eliquis taken in PM of 1/27.    Anesthesia/Surgery risks, benefits and alternative options discussed and understood by patient/family.          Active Hospital Problems    Diagnosis  POA    SVT (supraventricular tachycardia) [I47.1]  Yes      Resolved Hospital Problems   No resolved problems to display.

## 2019-01-31 NOTE — NURSING
Ambulated on unit this morning with girlfriend at pt side.  Verbalized understanding of procedure.  Denies pain or SOB.  Resp even and unlabored.  Will continue to monitor.

## 2019-02-01 VITALS
WEIGHT: 280 LBS | BODY MASS INDEX: 34.1 KG/M2 | SYSTOLIC BLOOD PRESSURE: 130 MMHG | HEART RATE: 78 BPM | OXYGEN SATURATION: 95 % | RESPIRATION RATE: 16 BRPM | DIASTOLIC BLOOD PRESSURE: 70 MMHG | TEMPERATURE: 98 F | HEIGHT: 76 IN

## 2019-02-01 PROCEDURE — 25000003 PHARM REV CODE 250: Performed by: INTERNAL MEDICINE

## 2019-02-01 RX ORDER — DILTIAZEM HYDROCHLORIDE 180 MG/1
180 CAPSULE, COATED, EXTENDED RELEASE ORAL DAILY
Status: DISCONTINUED | OUTPATIENT
Start: 2019-02-01 | End: 2019-02-01 | Stop reason: HOSPADM

## 2019-02-01 RX ORDER — DIVALPROEX SODIUM 500 MG/1
500 TABLET, FILM COATED, EXTENDED RELEASE ORAL 3 TIMES DAILY
Status: DISCONTINUED | OUTPATIENT
Start: 2019-02-01 | End: 2019-02-01 | Stop reason: HOSPADM

## 2019-02-01 RX ORDER — ATORVASTATIN CALCIUM 20 MG/1
80 TABLET, FILM COATED ORAL DAILY
Status: DISCONTINUED | OUTPATIENT
Start: 2019-02-01 | End: 2019-02-01 | Stop reason: HOSPADM

## 2019-02-01 RX ORDER — ASPIRIN 81 MG/1
81 TABLET ORAL DAILY
Status: DISCONTINUED | OUTPATIENT
Start: 2019-02-01 | End: 2019-02-01 | Stop reason: HOSPADM

## 2019-02-01 RX ADMIN — DIVALPROEX SODIUM 500 MG: 500 TABLET, FILM COATED, EXTENDED RELEASE ORAL at 09:02

## 2019-02-01 RX ADMIN — ATORVASTATIN CALCIUM 80 MG: 20 TABLET, FILM COATED ORAL at 09:02

## 2019-02-01 RX ADMIN — DILTIAZEM HYDROCHLORIDE 180 MG: 180 CAPSULE, COATED, EXTENDED RELEASE ORAL at 09:02

## 2019-02-01 RX ADMIN — ASPIRIN 81 MG: 81 TABLET, COATED ORAL at 09:02

## 2019-02-01 NOTE — PLAN OF CARE
Problem: Adult Inpatient Plan of Care  Goal: Patient-Specific Goal (Individualization)  Outcome: Ongoing (interventions implemented as appropriate)  Plan of care discussed with patient. Patient is free of fall/trauma/injury. Denies CP, SOB, or pain/discomfort. Bilateral groin sites CDI-no hematoma. All questions addressed. Will continue to monitor.

## 2019-02-01 NOTE — PROGRESS NOTES
Patient is ready for discharge. Patient stable alert and oriented. IV removed. No complaints of pain. Discussed discharge plan. Reviewed medications and side effects, appointments, and answered questions with patient and family.

## 2019-02-01 NOTE — ANESTHESIA POSTPROCEDURE EVALUATION
"Anesthesia Post Evaluation    Patient: Vel Mead Ponthieux    Procedure(s) Performed: Procedure(s) (LRB):  ABLATION, ATRIAL TACHYCARDIA (N/A)    Final Anesthesia Type: general  Patient location during evaluation: PACU  Patient participation: Yes- Able to Participate  Level of consciousness: awake and alert  Post-procedure vital signs: reviewed and stable  Pain management: adequate  Airway patency: patent  PONV status at discharge: No PONV  Anesthetic complications: no      Cardiovascular status: blood pressure returned to baseline and stable  Respiratory status: unassisted  Hydration status: euvolemic  Follow-up not needed.        Visit Vitals  BP (!) 123/59 (BP Location: Left arm, Patient Position: Lying)   Pulse 78   Temp 36.9 °C (98.5 °F) (Oral)   Resp 16   Ht 6' 4" (1.93 m)   Wt 127 kg (280 lb)   SpO2 (!) 91%   BMI 34.08 kg/m²       Pain/Concepcion Score: Pain Rating Prior to Med Admin: 5 (1/31/2019  7:26 PM)  Concepcion Score: 10 (1/31/2019  5:26 PM)        "

## 2019-02-01 NOTE — DISCHARGE SUMMARY
Discharge Summary  Electrophysiology      Admit Date: 1/31/2019    Discharge Date:  2/1/2019    Attending Physician: Demian Davis MD    Discharge Physician: Geovanny Viera MD    Principal Diagnoses: AFL  Indication for Admission: CTI RFA    Discharged Condition: Good    Hospital Course:   Patient presented for outpatient CTI RFA which went without complication. .    Outpatient Plan:  - Follow-up appointment in 4-6 weeks with Demian Davis MD  - Medication changes/ additions include: DC Eliquis    Diet: Cardiac diet    Activity: Ad rosemary, wound care instructions provided    Disposition: Home or Self Care    Discharge Medications:      Medication List      CONTINUE taking these medications    aspirin 81 MG EC tablet  Commonly known as:  ECOTRIN  Take 1 tablet (81 mg total) by mouth once daily.     atorvastatin 80 MG tablet  Commonly known as:  LIPITOR  Take 1 tablet (80 mg total) by mouth once daily.     diltiaZEM 180 MG 24 hr capsule  Commonly known as:  CARDIZEM CD  Take 1 capsule (180 mg total) by mouth once daily.     divalproex  MG Tb24  Commonly known as:  DEPAKOTE     ondansetron 4 MG Tbdl  Commonly known as:  ZOFRAN-ODT  Take 1 tablet (4 mg total) by mouth every 8 (eight) hours as needed (nausea).        STOP taking these medications    apixaban 5 mg Tab  Commonly known as:  ELIQUIS

## 2019-02-01 NOTE — PLAN OF CARE
Problem: Adult Inpatient Plan of Care  Goal: Plan of Care Review  Outcome: Ongoing (interventions implemented as appropriate)  John groin sites without complications overnight. Pt educated on fall risks, Pt remained free from falls/trauma/injury. VSS. Denies any CP, SOB, palpitations, dizziness, pain and discomfort. Turning/repositioning independently in bed. Plan of care reviewed with patient and all questions answered, verbalizes understanding. No acute distress noted. Will continue to monitor.

## 2019-02-10 ENCOUNTER — PATIENT MESSAGE (OUTPATIENT)
Dept: ELECTROPHYSIOLOGY | Facility: CLINIC | Age: 34
End: 2019-02-10

## 2019-02-12 ENCOUNTER — PATIENT MESSAGE (OUTPATIENT)
Dept: ELECTROPHYSIOLOGY | Facility: CLINIC | Age: 34
End: 2019-02-12

## 2019-02-14 DIAGNOSIS — I47.10 PAROXYSMAL SVT (SUPRAVENTRICULAR TACHYCARDIA): Primary | ICD-10-CM

## 2019-02-14 NOTE — PROGRESS NOTES
Pt complains of heart racing and shortness of breath since RFA. Episodes brief lasting 5 minutes or so, but frequent. Discussed with Dr. Davis. Holter order placed and arranged.

## 2019-02-18 ENCOUNTER — OFFICE VISIT (OUTPATIENT)
Dept: PSYCHIATRY | Facility: CLINIC | Age: 34
End: 2019-02-18
Payer: COMMERCIAL

## 2019-02-18 VITALS
HEIGHT: 76 IN | BODY MASS INDEX: 36.05 KG/M2 | HEART RATE: 73 BPM | WEIGHT: 296.06 LBS | SYSTOLIC BLOOD PRESSURE: 130 MMHG | DIASTOLIC BLOOD PRESSURE: 74 MMHG

## 2019-02-18 DIAGNOSIS — F31.9 BIPOLAR 1 DISORDER: Primary | Chronic | ICD-10-CM

## 2019-02-18 PROCEDURE — 99999 PR PBB SHADOW E&M-EST. PATIENT-LVL III: ICD-10-PCS | Mod: PBBFAC,,, | Performed by: NURSE PRACTITIONER

## 2019-02-18 PROCEDURE — 99999 PR PBB SHADOW E&M-EST. PATIENT-LVL III: CPT | Mod: PBBFAC,,, | Performed by: NURSE PRACTITIONER

## 2019-02-18 PROCEDURE — 99204 OFFICE O/P NEW MOD 45 MIN: CPT | Mod: S$GLB,,, | Performed by: NURSE PRACTITIONER

## 2019-02-18 PROCEDURE — 99204 PR OFFICE/OUTPT VISIT, NEW, LEVL IV, 45-59 MIN: ICD-10-PCS | Mod: S$GLB,,, | Performed by: NURSE PRACTITIONER

## 2019-02-18 RX ORDER — DIVALPROEX SODIUM 500 MG/1
500 TABLET, FILM COATED, EXTENDED RELEASE ORAL 3 TIMES DAILY
Qty: 270 TABLET | Refills: 1 | Status: SHIPPED | OUTPATIENT
Start: 2019-02-18 | End: 2019-04-25

## 2019-02-18 NOTE — PROGRESS NOTES
"Outpatient Psychiatry Initial Visit (MD/NP)    2/18/2019    Vel Ordonez, a 33 y.o. male, presenting for initial evaluation visit. Met with patient.    Reason for Encounter: Referral from Sofia Jensen PA-C. Patient complains of bipolar disorder.    History of Present Illness:     Pt is a 33-year old male with hx of bipolar 1 disorder who states he needs to establish care with new provider due to changes in his insurance.  Pt reports that he has been stable on current medication - Depakote.  Reports onset of bipolar in 2011. Was going to [facility] and before that [facility].  Hx of 2 inpatient hospitalizations. Inpatient last year for 4-days because "I went nuts because I was off my meds".  Denies hx of renetta ,"I get angry and hit walls and stuff but never aggressive to people".  Currently denies symptoms of depression or anxiety.  Thought processes are clear and organized.  Denies SI/HI/AVH.     Psychiatric Medications: currently taking: Depakote  mg po TID    Past trials include: Zoloft, pt reports other med trials but cannot recall specifics    Social History: Pt works full-time as a .  Lives with girlfriend and their 2 children.  Denies family hx of mental illness.  Denies use of tobacco, alcohol, or elicit drugs.     Stressors/Triggers: none identified    Coping Skills: limited    Medical History: HTN, SVT,    Review Of Systems:     GENERAL:  No weight gain or loss  SKIN:  No rashes or lacerations  HEAD:  No headaches  EYES:  No exophthalmos, jaundice or blindness  EARS:  No dizziness, tinnitus or hearing loss  NOSE:  No changes in smell  MOUTH & THROAT:  No dyskinetic movements or obvious goiter  CHEST:  No shortness of breath, hyperventilation or cough  CARDIOVASCULAR:  No tachycardia or chest pain  ABDOMEN:  No nausea, vomiting, pain, constipation or diarrhea  URINARY:  No frequency, dysuria or sexual dysfunction  ENDOCRINE:  No polydipsia, " "polyuria  MUSCULOSKELETAL:  No pain or stiffness of the joints  NEUROLOGIC:  No weakness, sensory changes, seizures, confusion, memory loss, tremor or other abnormal movements    Current Evaluation:     Nutritional Screening: Considering the patient's height and weight, medications, medical history and preferences, should a referral be made to the dietitian? no    Constitutional  Vitals:  Most recent vital signs, dated less than 90 days prior to this appointment, were reviewed.    Vitals:    02/18/19 0753   BP: 130/74   Pulse: 73   Weight: 134.3 kg (296 lb 1.2 oz)   Height: 6' 4" (1.93 m)        General:  unremarkable, age appropriate     Musculoskeletal  Muscle Strength/Tone:  no tremor, no tic   Gait & Station:  non-ataxic     Psychiatric  Speech:  no latency; no press   Mood & Affect:  steady, euthymic  congruent and appropriate   Thought Process:  normal and logical   Associations:  intact   Thought Content:  normal, no suicidality, no homicidality, delusions, or paranoia   Insight:  intact   Judgement: behavior is adequate to circumstances   Orientation:  grossly intact   Memory: intact for content of interview   Language: grossly intact   Attention Span & Concentration:  able to focus   Fund of Knowledge:  intact and appropriate to age and level of education       Relevant Elements of Neurological Exam: normal gait    Functioning in Relationships:  Spouse/partner: see above HPI  Peers: see above HPI  Employers: see above HPI    Laboratory Data  Admission on 01/31/2019, Discharged on 02/01/2019   Component Date Value Ref Range Status    Group & Rh 01/31/2019 O POS   Final    Indirect Kasey 01/31/2019 NEG   Final   Lab Visit on 01/25/2019   Component Date Value Ref Range Status    aPTT 01/25/2019 27.2  21.0 - 32.0 sec Final    Sodium 01/25/2019 139  136 - 145 mmol/L Final    Potassium 01/25/2019 4.5  3.5 - 5.1 mmol/L Final    Chloride 01/25/2019 103  95 - 110 mmol/L Final    CO2 01/25/2019 30* 23 - 29 " mmol/L Final    Glucose 01/25/2019 95  70 - 110 mg/dL Final    BUN, Bld 01/25/2019 15  6 - 20 mg/dL Final    Creatinine 01/25/2019 0.8  0.5 - 1.4 mg/dL Final    Calcium 01/25/2019 9.4  8.7 - 10.5 mg/dL Final    Anion Gap 01/25/2019 6* 8 - 16 mmol/L Final    eGFR if African American 01/25/2019 >60.0  >60 mL/min/1.73 m^2 Final    eGFR if non African American 01/25/2019 >60.0  >60 mL/min/1.73 m^2 Final    WBC 01/25/2019 8.59  3.90 - 12.70 K/uL Final    RBC 01/25/2019 5.10  4.60 - 6.20 M/uL Final    Hemoglobin 01/25/2019 15.4  14.0 - 18.0 g/dL Final    Hematocrit 01/25/2019 48.0  40.0 - 54.0 % Final    MCV 01/25/2019 94  82 - 98 fL Final    MCH 01/25/2019 30.2  27.0 - 31.0 pg Final    MCHC 01/25/2019 32.1  32.0 - 36.0 g/dL Final    RDW 01/25/2019 12.9  11.5 - 14.5 % Final    Platelets 01/25/2019 325  150 - 350 K/uL Final    MPV 01/25/2019 10.3  9.2 - 12.9 fL Final    Immature Granulocytes 01/25/2019 0.5  0.0 - 0.5 % Final    Gran # (ANC) 01/25/2019 4.8  1.8 - 7.7 K/uL Final    Immature Grans (Abs) 01/25/2019 0.04  0.00 - 0.04 K/uL Final    Lymph # 01/25/2019 2.4  1.0 - 4.8 K/uL Final    Mono # 01/25/2019 1.2* 0.3 - 1.0 K/uL Final    Eos # 01/25/2019 0.2  0.0 - 0.5 K/uL Final    Baso # 01/25/2019 0.07  0.00 - 0.20 K/uL Final    nRBC 01/25/2019 0  0 /100 WBC Final    Gran% 01/25/2019 55.3  38.0 - 73.0 % Final    Lymph% 01/25/2019 27.4  18.0 - 48.0 % Final    Mono% 01/25/2019 13.9  4.0 - 15.0 % Final    Eosinophil% 01/25/2019 2.1  0.0 - 8.0 % Final    Basophil% 01/25/2019 0.8  0.0 - 1.9 % Final    Differential Method 01/25/2019 Automated   Final    Prothrombin Time 01/25/2019 9.7  9.0 - 12.5 sec Final    INR 01/25/2019 0.9  0.8 - 1.2 Final     Medications  Outpatient Encounter Medications as of 2/18/2019   Medication Sig Dispense Refill    aspirin (ECOTRIN) 81 MG EC tablet Take 1 tablet (81 mg total) by mouth once daily.  0    atorvastatin (LIPITOR) 80 MG tablet Take 1 tablet (80 mg  total) by mouth once daily. 90 tablet 3    diltiaZEM (CARDIZEM CD) 180 MG 24 hr capsule Take 1 capsule (180 mg total) by mouth once daily. 90 capsule 3    divalproex ER (DEPAKOTE) 500 MG Tb24 500 mg 3 (three) times daily.       ondansetron (ZOFRAN-ODT) 4 MG TbDL Take 1 tablet (4 mg total) by mouth every 8 (eight) hours as needed (nausea). 12 tablet 0     No facility-administered encounter medications on file as of 2/18/2019.        Assessment - Diagnosis - Goals:     Impression:       ICD-10-CM ICD-9-CM   1. Bipolar 1 disorder F31.9 296.7       Strengths and Liabilities: Strength: Patient accepts guidance/feedback, Strength: Patient is expressive/articulate., Strength: Patient is stable., Liability: Patient lacks coping skills.    Treatment Goals:  Specify outcomes written in observable, behavioral terms:   Depression: acquiring relapse prevention skills, increasing interest in usual activities, increasing motivation, increasing self-reward for positive behaviors (one/day), increasing self-reward for positive thoughts (one/day) and reducing negative automatic thoughts    Treatment Plan/Recommendations:   · Medication Management: Continue current medications. The risks and benefits of medication were discussed with the patient.  · Labs: Valproic Acid level  · The treatment plan and follow up plan were reviewed with the patient.   · Continue Depakote  mg po TID  · Reviewed medical chart, labs, and .   · Depakote level results 72 (therapeutic)  · Counseling this visit focused on medication teaching and building adaptive coping skills for anger management.     Return to Clinic: 6 months    Counseling time: 28 minutes  Total time: 50 minutes  Consulting clinician was informed of the encounter and consult note.

## 2019-02-21 ENCOUNTER — LAB VISIT (OUTPATIENT)
Dept: LAB | Facility: HOSPITAL | Age: 34
End: 2019-02-21
Attending: INTERNAL MEDICINE
Payer: COMMERCIAL

## 2019-02-21 ENCOUNTER — CLINICAL SUPPORT (OUTPATIENT)
Dept: CARDIOLOGY | Facility: HOSPITAL | Age: 34
End: 2019-02-21
Attending: INTERNAL MEDICINE
Payer: COMMERCIAL

## 2019-02-21 DIAGNOSIS — F31.9 BIPOLAR 1 DISORDER: Chronic | ICD-10-CM

## 2019-02-21 DIAGNOSIS — I47.10 PAROXYSMAL SVT (SUPRAVENTRICULAR TACHYCARDIA): ICD-10-CM

## 2019-02-21 LAB — VALPROATE SERPL-MCNC: 72 UG/ML

## 2019-02-21 PROCEDURE — 93226 XTRNL ECG REC<48 HR SCAN A/R: CPT

## 2019-02-21 PROCEDURE — 36415 COLL VENOUS BLD VENIPUNCTURE: CPT

## 2019-02-21 PROCEDURE — 93227 XTRNL ECG REC<48 HR R&I: CPT | Mod: ,,, | Performed by: INTERNAL MEDICINE

## 2019-02-21 PROCEDURE — 80164 ASSAY DIPROPYLACETIC ACD TOT: CPT

## 2019-02-21 PROCEDURE — 93227 HOLTER MONITOR - 48 HOUR (CUPID ONLY): ICD-10-PCS | Mod: ,,, | Performed by: INTERNAL MEDICINE

## 2019-03-01 ENCOUNTER — TELEPHONE (OUTPATIENT)
Dept: ELECTROPHYSIOLOGY | Facility: CLINIC | Age: 34
End: 2019-03-01

## 2019-03-01 DIAGNOSIS — I47.10 PSVT (PAROXYSMAL SUPRAVENTRICULAR TACHYCARDIA): Primary | ICD-10-CM

## 2019-03-01 LAB
OHS CV EVENT MONITOR DAY: 0
OHS CV HOLTER LENGTH DECIMAL HOURS: 48
OHS CV HOLTER LENGTH HOURS: 48
OHS CV HOLTER LENGTH MINUTES: 0

## 2019-03-01 RX ORDER — DILTIAZEM HYDROCHLORIDE 240 MG/1
240 CAPSULE, COATED, EXTENDED RELEASE ORAL DAILY
Qty: 90 CAPSULE | Refills: 3 | Status: SHIPPED | OUTPATIENT
Start: 2019-03-01 | End: 2020-02-29

## 2019-03-01 NOTE — TELEPHONE ENCOUNTER
Some SVT on Holter; doesn't appear c/w typical AVNRT or AFL. Correlated with sx.  Discussed via phone. No inducible SVT at last EPS, despite DAES and iso.    Increase diltiazem.  Holter in 1 month.  If continues to have symptomatic palps, could consider return to EP lab (hopefully successfully inducing SVT then), vs. trial of AAD (eg. flecainide) and/or BB.

## 2019-03-04 ENCOUNTER — TELEPHONE (OUTPATIENT)
Dept: ELECTROPHYSIOLOGY | Facility: CLINIC | Age: 34
End: 2019-03-04

## 2019-03-04 NOTE — TELEPHONE ENCOUNTER
Called pt to arrange  holter in 1 month. No answer. Left voicemail with apt on 4/2. Requested pt call back to confirm or reschedule.    Burow's Advancement Flap Text: The defect edges were debeveled with a #15 scalpel blade.  Given the location of the defect and the proximity to free margins a Burow's advancement flap was deemed most appropriate.  Using a sterile surgical marker, the appropriate advancement flap was drawn incorporating the defect and placing the expected incisions within the relaxed skin tension lines where possible.    The area thus outlined was incised deep to adipose tissue with a #15 scalpel blade.  The skin margins were undermined to an appropriate distance in all directions utilizing iris scissors.

## 2019-04-02 ENCOUNTER — CLINICAL SUPPORT (OUTPATIENT)
Dept: CARDIOLOGY | Facility: HOSPITAL | Age: 34
End: 2019-04-02
Attending: INTERNAL MEDICINE
Payer: COMMERCIAL

## 2019-04-02 DIAGNOSIS — I47.10 PSVT (PAROXYSMAL SUPRAVENTRICULAR TACHYCARDIA): ICD-10-CM

## 2019-04-02 PROCEDURE — 93225 XTRNL ECG REC<48 HRS REC: CPT

## 2019-04-02 PROCEDURE — 93227 XTRNL ECG REC<48 HR R&I: CPT | Mod: ,,, | Performed by: INTERNAL MEDICINE

## 2019-04-02 PROCEDURE — 93227 HOLTER MONITOR - 24 HOUR (CUPID ONLY): ICD-10-PCS | Mod: ,,, | Performed by: INTERNAL MEDICINE

## 2019-04-03 ENCOUNTER — TELEPHONE (OUTPATIENT)
Dept: CARDIOLOGY | Facility: HOSPITAL | Age: 34
End: 2019-04-03

## 2019-04-03 NOTE — TELEPHONE ENCOUNTER
----- Message from Ese Lobo sent at 4/3/2019 10:51 AM CDT -----  Contact: Self  .Needs Advice    Reason for call: Pt's holter got turned off not sure when it happen. Asking if he need to re-start  Please. thanks        Communication Preference:  [phone]    Additional Information:

## 2019-04-08 ENCOUNTER — PATIENT MESSAGE (OUTPATIENT)
Dept: ELECTROPHYSIOLOGY | Facility: CLINIC | Age: 34
End: 2019-04-08

## 2019-04-09 LAB
OHS CV EVENT MONITOR DAY: 0
OHS CV HOLTER LENGTH DECIMAL HOURS: 24
OHS CV HOLTER LENGTH HOURS: 24
OHS CV HOLTER LENGTH MINUTES: 0

## 2019-04-12 ENCOUNTER — PATIENT MESSAGE (OUTPATIENT)
Dept: PSYCHIATRY | Facility: CLINIC | Age: 34
End: 2019-04-12

## 2019-04-25 ENCOUNTER — OFFICE VISIT (OUTPATIENT)
Dept: PSYCHIATRY | Facility: CLINIC | Age: 34
End: 2019-04-25
Payer: COMMERCIAL

## 2019-04-25 VITALS
HEART RATE: 71 BPM | SYSTOLIC BLOOD PRESSURE: 142 MMHG | HEIGHT: 76 IN | WEIGHT: 298.31 LBS | DIASTOLIC BLOOD PRESSURE: 94 MMHG | BODY MASS INDEX: 36.33 KG/M2

## 2019-04-25 DIAGNOSIS — F31.9 BIPOLAR 1 DISORDER: Chronic | ICD-10-CM

## 2019-04-25 PROCEDURE — 99999 PR PBB SHADOW E&M-EST. PATIENT-LVL III: CPT | Mod: PBBFAC,,, | Performed by: NURSE PRACTITIONER

## 2019-04-25 PROCEDURE — 99213 OFFICE O/P EST LOW 20 MIN: CPT | Mod: S$GLB,,, | Performed by: NURSE PRACTITIONER

## 2019-04-25 PROCEDURE — 90833 PSYTX W PT W E/M 30 MIN: CPT | Mod: S$GLB,,, | Performed by: NURSE PRACTITIONER

## 2019-04-25 PROCEDURE — 99999 PR PBB SHADOW E&M-EST. PATIENT-LVL III: ICD-10-PCS | Mod: PBBFAC,,, | Performed by: NURSE PRACTITIONER

## 2019-04-25 PROCEDURE — 90833 PR PSYCHOTHERAPY W/PATIENT W/E&M, 30 MIN (ADD ON): ICD-10-PCS | Mod: S$GLB,,, | Performed by: NURSE PRACTITIONER

## 2019-04-25 PROCEDURE — 99213 PR OFFICE/OUTPT VISIT, EST, LEVL III, 20-29 MIN: ICD-10-PCS | Mod: S$GLB,,, | Performed by: NURSE PRACTITIONER

## 2019-04-25 RX ORDER — DIVALPROEX SODIUM 500 MG/1
500 TABLET, FILM COATED, EXTENDED RELEASE ORAL 3 TIMES DAILY
Qty: 270 TABLET | Refills: 1 | Status: SHIPPED | OUTPATIENT
Start: 2019-04-25 | End: 2019-10-18 | Stop reason: SDUPTHER

## 2019-04-25 RX ORDER — ARIPIPRAZOLE 5 MG/1
5 TABLET ORAL DAILY
Qty: 90 TABLET | Refills: 1 | Status: SHIPPED | OUTPATIENT
Start: 2019-04-25 | End: 2019-10-18 | Stop reason: SINTOL

## 2019-04-25 NOTE — PROGRESS NOTES
Outpatient Psychiatry Follow-Up Visit (MD/NP)    4/25/2019    Clinical Status of Patient:  Outpatient (Ambulatory)    Chief Complaint:  Vel Ordonez is a 33 y.o. male who presents today for follow-up of mood disorder.  Met with patient.      Last visit was: 2/18/19. Chart and  reviewed.     Interval History and Content of Current Session:  Current Psychiatric Medications/changes  · Continue Depakote  mg po TID    I've been feeling more aggravated.  Hitting walls.  Argues with girlfreind about ex girlfriend.  Hx of good results on Ailiby . Will restart Abilify 5 mg daily.  Doing well at work. Working a lot of overtime. Denies SI/HI/AVH.     Psychotherapy:  · Target symptoms: mood swings, mood disorder  · Why chosen therapy is appropriate versus another modality: relevant to diagnosis  · Outcome monitoring methods: self-report  · Therapeutic intervention type: insight oriented psychotherapy  · Topics discussed/themes: building skills sets for symptom management, symptom recognition  · The patient's response to the intervention is accepting. The patient's progress toward treatment goals is good.   · Duration of intervention: 19 minutes.    Review of Systems   · PSYCHIATRIC: Pertinant items are noted in the narrative.  · CONSTITUTIONAL: No weight gain or loss.   · MUSCULOSKELETAL: No pain or stiffness of the joints.  · NEUROLOGIC: No weakness, sensory changes, seizures, confusion, memory loss, tremor or other abnormal movements.  · ENDOCRINE: No polydipsia or polyuria.  · INTEGUMENTARY: No rashes or lacerations.  · EYES: No exophthalmos, jaundice or blindness.  · ENT: No dizziness, tinnitus or hearing loss.  · RESPIRATORY: No shortness of breath.  · CARDIOVASCULAR: No tachycardia or chest pain.  · GASTROINTESTINAL: No nausea, vomiting, pain, constipation or diarrhea.  · GENITOURINARY: No frequency, dysuria or sexual dysfunction.  · HEMATOLOGIC/LYMPHATIC: No excessive bleeding, prolonged or excessive  "bleeding after dental extraction/injury.  · ALLERGIC/IMMUNOLOGIC: No allergic response to materials, foods or animals at this time.    Past Medical, Family and Social History: The patient's past medical, family and social history have been reviewed and updated as appropriate within the electronic medical record - see encounter notes.    Compliance: yes    Side effects: None    Risk Parameters:  Patient reports no suicidal ideation  Patient reports no homicidal ideation  Patient reports no self-injurious behavior  Patient reports no violent behavior    Exam (detailed: at least 9 elements; comprehensive: all 15 elements)   Constitutional  Vitals:  Most recent vital signs, dated greater than 90 days prior to this appointment, were reviewed.   Vitals:    04/25/19 1500   BP: (!) 142/94   Pulse: 71   Weight: 135.3 kg (298 lb 4.5 oz)   Height: 6' 4" (1.93 m)        General:  unremarkable, age appropriate     Musculoskeletal  Muscle Strength/Tone:  no tremor, no tic   Gait & Station:  non-ataxic     Psychiatric  Speech:  no latency; no press   Mood & Affect:  euthymic  congruent and appropriate   Thought Process:  normal and logical   Associations:  intact   Thought Content:  normal, no suicidality, no homicidality, delusions, or paranoia   Insight:  intact   Judgement: behavior is adequate to circumstances   Orientation:  grossly intact   Memory: intact for content of interview   Language: grossly intact   Attention Span & Concentration:  able to focus   Fund of Knowledge:  intact and appropriate to age and level of education     Assessment and Diagnosis   Status/Progress: Based on the examination today, the patient's problem(s) is/are worsening.  New problems have not been presented today.   Co-morbidities and Lack of compliance are not complicating management of the primary condition.  There are no active rule-out diagnoses for this patient at this time.     General Impression:       ICD-10-CM ICD-9-CM   1. Bipolar 1 " disorder F31.9 296.7       Intervention/Counseling/Treatment Plan   · Medication Management: Continue current medications. The risks and benefits of medication were discussed with the patient.  · Continue Depakote  mg po TID  · Start Abilify 5 mg po daily    Return to Clinic: 3 months

## 2019-08-13 ENCOUNTER — HOSPITAL ENCOUNTER (EMERGENCY)
Facility: HOSPITAL | Age: 34
Discharge: HOME | End: 2019-08-13
Attending: EMERGENCY MEDICINE
Payer: COMMERCIAL

## 2019-08-13 VITALS
TEMPERATURE: 98 F | HEIGHT: 76 IN | DIASTOLIC BLOOD PRESSURE: 85 MMHG | SYSTOLIC BLOOD PRESSURE: 131 MMHG | WEIGHT: 290 LBS | OXYGEN SATURATION: 97 % | HEART RATE: 74 BPM | RESPIRATION RATE: 20 BRPM | BODY MASS INDEX: 35.31 KG/M2

## 2019-08-13 DIAGNOSIS — R42 LIGHTHEADEDNESS: ICD-10-CM

## 2019-08-13 DIAGNOSIS — J40 BRONCHITIS: Primary | ICD-10-CM

## 2019-08-13 DIAGNOSIS — R05.9 COUGH: ICD-10-CM

## 2019-08-13 LAB
ALBUMIN SERPL-MCNC: 4 G/DL (ref 3.3–5.5)
ALP SERPL-CCNC: 81 U/L (ref 42–141)
BILIRUB SERPL-MCNC: 0.5 MG/DL (ref 0.2–1.6)
BUN SERPL-MCNC: 15 MG/DL (ref 7–22)
CALCIUM SERPL-MCNC: 9.8 MG/DL (ref 8–10.3)
CHLORIDE SERPL-SCNC: 105 MMOL/L (ref 98–108)
CREAT SERPL-MCNC: 1.1 MG/DL (ref 0.6–1.2)
GLUCOSE SERPL-MCNC: 95 MG/DL (ref 73–118)
POC ALT (SGPT): 43 U/L (ref 10–47)
POC AST (SGOT): 33 U/L (ref 11–38)
POC B-TYPE NATRIURETIC PEPTIDE: 12.5 PG/ML (ref 0–100)
POC CARDIAC TROPONIN I: 0 NG/ML
POC TCO2: 28 MMOL/L (ref 18–33)
POTASSIUM BLD-SCNC: 4.3 MMOL/L (ref 3.6–5.1)
PROTEIN, POC: 8.2 G/DL (ref 6.4–8.1)
SAMPLE: NORMAL
SODIUM BLD-SCNC: 142 MMOL/L (ref 128–145)

## 2019-08-13 PROCEDURE — 84484 ASSAY OF TROPONIN QUANT: CPT | Mod: ER

## 2019-08-13 PROCEDURE — 80053 COMPREHEN METABOLIC PANEL: CPT | Mod: ER

## 2019-08-13 PROCEDURE — 99285 EMERGENCY DEPT VISIT HI MDM: CPT | Mod: 25,ER

## 2019-08-13 PROCEDURE — 93010 ELECTROCARDIOGRAM REPORT: CPT | Mod: ,,, | Performed by: INTERNAL MEDICINE

## 2019-08-13 PROCEDURE — 85025 COMPLETE CBC W/AUTO DIFF WBC: CPT | Mod: ER

## 2019-08-13 PROCEDURE — 93010 EKG 12-LEAD: ICD-10-PCS | Mod: ,,, | Performed by: INTERNAL MEDICINE

## 2019-08-13 PROCEDURE — 93005 ELECTROCARDIOGRAM TRACING: CPT | Mod: ER

## 2019-08-13 PROCEDURE — 83880 ASSAY OF NATRIURETIC PEPTIDE: CPT | Mod: ER

## 2019-08-13 RX ORDER — FAMOTIDINE 20 MG/1
20 TABLET, FILM COATED ORAL 2 TIMES DAILY
Qty: 20 TABLET | Refills: 0 | Status: SHIPPED | OUTPATIENT
Start: 2019-08-13 | End: 2020-08-12

## 2019-08-13 RX ORDER — METHYLPREDNISOLONE 4 MG/1
TABLET ORAL
Qty: 1 PACKAGE | Refills: 0 | Status: SHIPPED | OUTPATIENT
Start: 2019-08-13 | End: 2019-09-03

## 2019-08-13 RX ORDER — MONTELUKAST SODIUM 10 MG/1
10 TABLET ORAL NIGHTLY
Qty: 30 TABLET | Refills: 0 | Status: SHIPPED | OUTPATIENT
Start: 2019-08-13 | End: 2019-09-12

## 2019-08-13 RX ORDER — BENZONATATE 100 MG/1
100 CAPSULE ORAL 3 TIMES DAILY PRN
Qty: 20 CAPSULE | Refills: 0 | Status: SHIPPED | OUTPATIENT
Start: 2019-08-13 | End: 2019-08-23

## 2019-08-13 RX ORDER — PROMETHAZINE HYDROCHLORIDE AND DEXTROMETHORPHAN HYDROBROMIDE 6.25; 15 MG/5ML; MG/5ML
5 SYRUP ORAL NIGHTLY PRN
Qty: 118 ML | Refills: 0 | Status: SHIPPED | OUTPATIENT
Start: 2019-08-13 | End: 2019-08-23

## 2019-08-13 RX ORDER — LORATADINE 10 MG/1
10 TABLET ORAL DAILY
Qty: 60 TABLET | Refills: 0 | COMMUNITY
Start: 2019-08-13 | End: 2020-08-12

## 2019-08-13 RX ORDER — BENZONATATE 100 MG/1
100 CAPSULE ORAL 3 TIMES DAILY PRN
Qty: 20 CAPSULE | Refills: 0 | Status: SHIPPED | OUTPATIENT
Start: 2019-08-13 | End: 2019-08-13 | Stop reason: SDUPTHER

## 2019-08-13 RX ORDER — FLUTICASONE PROPIONATE 50 MCG
2 SPRAY, SUSPENSION (ML) NASAL DAILY
Qty: 16 G | Refills: 0 | Status: SHIPPED | OUTPATIENT
Start: 2019-08-13

## 2019-08-13 RX ORDER — FAMOTIDINE 20 MG/1
20 TABLET, FILM COATED ORAL 2 TIMES DAILY
Qty: 20 TABLET | Refills: 0 | Status: SHIPPED | OUTPATIENT
Start: 2019-08-13 | End: 2019-08-13 | Stop reason: SDUPTHER

## 2019-08-13 RX ORDER — METHYLPREDNISOLONE 4 MG/1
TABLET ORAL
Qty: 1 PACKAGE | Refills: 0 | Status: SHIPPED | OUTPATIENT
Start: 2019-08-13 | End: 2019-08-13 | Stop reason: SDUPTHER

## 2019-08-13 RX ORDER — PROMETHAZINE HYDROCHLORIDE AND DEXTROMETHORPHAN HYDROBROMIDE 6.25; 15 MG/5ML; MG/5ML
5 SYRUP ORAL NIGHTLY PRN
Qty: 118 ML | Refills: 0 | Status: SHIPPED | OUTPATIENT
Start: 2019-08-13 | End: 2019-08-13 | Stop reason: SDUPTHER

## 2019-08-13 RX ORDER — LORATADINE 10 MG/1
10 TABLET ORAL DAILY
Qty: 60 TABLET | Refills: 0 | COMMUNITY
Start: 2019-08-13 | End: 2019-08-13 | Stop reason: SDUPTHER

## 2019-08-13 RX ORDER — MONTELUKAST SODIUM 10 MG/1
10 TABLET ORAL NIGHTLY
Qty: 30 TABLET | Refills: 0 | Status: SHIPPED | OUTPATIENT
Start: 2019-08-13 | End: 2019-08-13 | Stop reason: SDUPTHER

## 2019-08-13 RX ORDER — FLUTICASONE PROPIONATE 50 MCG
2 SPRAY, SUSPENSION (ML) NASAL DAILY
Qty: 16 G | Refills: 0 | Status: SHIPPED | OUTPATIENT
Start: 2019-08-13 | End: 2019-08-13 | Stop reason: SDUPTHER

## 2019-08-13 RX ORDER — ALBUTEROL SULFATE 90 UG/1
2 AEROSOL, METERED RESPIRATORY (INHALATION) EVERY 6 HOURS PRN
Qty: 1 INHALER | Refills: 0 | Status: SHIPPED | OUTPATIENT
Start: 2019-08-13

## 2019-08-13 RX ORDER — ALBUTEROL SULFATE 90 UG/1
2 AEROSOL, METERED RESPIRATORY (INHALATION) EVERY 6 HOURS PRN
Qty: 1 INHALER | Refills: 0 | Status: SHIPPED | OUTPATIENT
Start: 2019-08-13 | End: 2019-08-13 | Stop reason: SDUPTHER

## 2019-08-14 NOTE — ED NOTES
Reports cough and dizziness x 2 days. Reports that dizziness is intermittent, denies dizziness at time of assessment. Denies N/V/D/C. Denies chest pain. Denies SOB. Reports that he does have a cardiac history, but denies cardiac problems at present. 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

## 2019-08-14 NOTE — ED PROVIDER NOTES
Encounter Date: 8/13/2019    SCRIBE #1 NOTE: I, Wilda Pacheco , am scribing for, and in the presence of,  Dr. Joe . I have scribed the entire note.       History     Chief Complaint   Patient presents with    Cough     PT C/O COUGH AND DIZZINESS FOR 2 DAYS, REPORTS DIZZINESS COMES AND GOES    Dizziness     Vel Ordonez is a 33 y.o. male who presents to the ED complaining of an acute non-productive cough since Sunday and intermittent dizziness for the last week. Pt states that he feels light-headed while he is outside walking. He has experienced this before when diagnosed with  SVT sp ablation x 2 the most recent of which was Feb 2019.  He is currently not on any heart medication. He has not taken anything for his symptoms. He is not light-headed upon examination. He denies chest pain, SOB, weakness, congestion, and activity change.     The history is provided by the patient.     Review of patient's allergies indicates:  No Known Allergies  Past Medical History:   Diagnosis Date    Atrial flutter     Bipolar 1 disorder 2011    Depression     Hx of psychiatric care     Hypertension     Psychiatric problem     Supraventricular tachycardia     Therapy      Past Surgical History:   Procedure Laterality Date    ABLATION N/A 9/17/2018    Performed by Demian Davis MD at [facility] CATH LAB    ABLATION, ATRIAL TACHYCARDIA N/A 1/31/2019    Performed by Demian Davis MD at [facility] EP LAB     Family History   Problem Relation Age of Onset    Cancer Mother     Heart disease Father     Diabetes Father      Social History     Tobacco Use    Smoking status: Never Smoker    Smokeless tobacco: Never Used   Substance Use Topics    Alcohol use: No    Drug use: No     Review of Systems   Constitutional: Negative for activity change, appetite change and fever.   HENT: Negative for congestion, postnasal drip and rhinorrhea.    Respiratory: Positive for cough. Negative for shortness of breath.    Cardiovascular:  Negative for chest pain and palpitations.   Neurological: Positive for dizziness and light-headedness. Negative for weakness and numbness.   All other systems reviewed and are negative.      Physical Exam     Initial Vitals [08/13/19 1855]   BP Pulse Resp Temp SpO2   (!) 145/86 80 20 97.8 °F (36.6 °C) 99 %      MAP       --         Physical Exam    Nursing note and vitals reviewed.  Constitutional: He appears well-developed and well-nourished.   HENT:   Head: Normocephalic and atraumatic.   Right Ear: External ear normal. Tympanic membrane is not injected, not erythematous and not bulging. A middle ear effusion (serous) is present.   Left Ear: External ear normal. Tympanic membrane is not injected, not erythematous and not bulging. A middle ear effusion (serous) is present.       Eyes: Conjunctivae are normal.   Neck: Normal range of motion and phonation normal. Neck supple.   Cardiovascular: Normal rate, regular rhythm and intact distal pulses.   Pulmonary/Chest: Effort normal. No stridor. No respiratory distress.   Abdominal: Normal appearance.   Musculoskeletal: Normal range of motion. He exhibits no edema.   Neurological: He is alert and oriented to person, place, and time. He has normal strength. No cranial nerve deficit or sensory deficit. GCS eye subscore is 4. GCS verbal subscore is 5. GCS motor subscore is 6.   Skin: Skin is warm and dry.   Psychiatric: He has a normal mood and affect. His behavior is normal.         ED Course   Procedures  Labs Reviewed   POCT CMP - Abnormal; Notable for the following components:       Result Value    Protein 8.2 (*)     All other components within normal limits   TROPONIN ISTAT   POCT CBC   POCT CMP   POCT TROPONIN   POCT B-TYPE NATRIURETIC PEPTIDE (BNP)   POCT B-TYPE NATRIURETIC PEPTIDE (BNP)     EKG Readings: (Independently Interpreted)   Initial Reading: No STEMI. Rhythm: Normal Sinus Rhythm. Heart Rate: 70. Conduction: RBBB (Incomplete). Axis: Left Axis Deviation.    Compared to prior 1/31/2019, unchanged from previous except pt no longer has sinus arrhythmia.        Imaging Results          X-Ray Chest PA And Lateral (Final result)  Result time 08/13/19 20:17:02    Final result by Yadiel Tompkins MD (08/13/19 20:17:02)                 Impression:      No acute abnormality.    Electronically signed by resident: Logan Soto MD  Date:    08/13/2019  Time:    20:06    Electronically signed by: Yadiel Tompkins MD  Date:    08/13/2019  Time:    20:17             Narrative:    EXAMINATION:  XR CHEST PA AND LATERAL    CLINICAL HISTORY:  Cough.    TECHNIQUE:  PA and lateral views of the chest were performed.    COMPARISON:  Chest radiograph 12/01/2018.    FINDINGS:  The lungs are clear, with normal appearance of pulmonary vasculature and no pleural effusion or pneumothorax.    The cardiomediastinal silhouette is normal.    Bones are intact.                                 Medical Decision Making:   History:   Old Medical Records: I decided to obtain old medical records.  Independently Interpreted Test(s):   I have ordered and independently interpreted EKG Reading(s) - see prior notes  Clinical Tests:   Radiological Study: Ordered and Reviewed  Medical Tests: Ordered and Reviewed    Labs Reviewed  Admission on 08/13/2019, Discharged on 08/13/2019   Component Date Value Ref Range Status    POC Cardiac Troponin I 08/13/2019 0.00  <0.09 ng/mL Final    Sample 08/13/2019 UNK   Final    Albumin, POC 08/13/2019 4.0  3.3 - 5.5 g/dL Final    Alkaline Phosphatase, POC 08/13/2019 81  42 - 141 U/L Final    ALT (SGPT), POC 08/13/2019 43  10 - 47 U/L Final    AST (SGOT), POC 08/13/2019 33  11 - 38 U/L Final    POC BUN 08/13/2019 15  7 - 22 mg/dL Final    Calcium, POC 08/13/2019 9.8  8 - 10.3 mg/dL Final    POC Chloride 08/13/2019 105  98 - 108 mmol/L Final    POC Creatinine 08/13/2019 1.1  0.6 - 1.2 mg/dL Final    POC Glucose 08/13/2019 95  73 - 118 mg/dL Final    POC Potassium  08/13/2019 4.3  3.6 - 5.1 mmol/L Final    POC Sodium 08/13/2019 142  128 - 145 mmol/L Final    Bilirubin 08/13/2019 0.5  0.2 - 1.6 mg/dL Final    POC TCO2 08/13/2019 28  18 - 33 mmol/L Final    Protein 08/13/2019 8.2* 6.4 - 8.1 g/dL Final    POC B-Type Natriuretic Peptide 08/13/2019 12.5  0 - 100 pg/mL Final              Imaging Reviewed    Imaging Results          X-Ray Chest PA And Lateral (Final result)  Result time 08/13/19 20:17:02    Final result by Yadiel Tompkins MD (08/13/19 20:17:02)                 Impression:      No acute abnormality.    Electronically signed by resident: Logan Soto MD  Date:    08/13/2019  Time:    20:06    Electronically signed by: Yadiel Tompkins MD  Date:    08/13/2019  Time:    20:17             Narrative:    EXAMINATION:  XR CHEST PA AND LATERAL    CLINICAL HISTORY:  Cough.    TECHNIQUE:  PA and lateral views of the chest were performed.    COMPARISON:  Chest radiograph 12/01/2018.    FINDINGS:  The lungs are clear, with normal appearance of pulmonary vasculature and no pleural effusion or pneumothorax.    The cardiomediastinal silhouette is normal.    Bones are intact.                                Medications given in ED    Medications - No data to display    This document was produced by a scribe under my direction and in my presence. I agree with the content of the note and have made any necessary edits.     Mari Joe MD         Note was created using voice recognition software. Note may have occasional typographical errors that may not have been identified and edited despite good jayy initial review prior to signing.            Scribe Attestation:   Scribe #1: I performed the above scribed service and the documentation accurately describes the services I performed. I attest to the accuracy of the note.             Discharge Medications     Discharge Medication List as of 8/13/2019  9:25 PM      CONTINUE these medications which have CHANGED    Details    albuterol (PROVENTIL/VENTOLIN HFA) 90 mcg/actuation inhaler Inhale 2 puffs into the lungs every 6 (six) hours as needed for Wheezing or Shortness of Breath., Starting Tue 8/13/2019, Normal      benzonatate (TESSALON) 100 MG capsule Take 1 capsule (100 mg total) by mouth 3 (three) times daily as needed for Cough., Starting Tue 8/13/2019, Until Fri 8/23/2019, Normal      famotidine (PEPCID) 20 MG tablet Take 1 tablet (20 mg total) by mouth 2 (two) times daily., Starting Tue 8/13/2019, Until Wed 8/12/2020, Normal      fluticasone propionate (FLONASE) 50 mcg/actuation nasal spray 2 sprays (100 mcg total) by Each Nostril route once daily., Starting Tue 8/13/2019, Normal      loratadine (CLARITIN) 10 mg tablet Take 1 tablet (10 mg total) by mouth once daily., Starting Tue 8/13/2019, Until Wed 8/12/2020, OTC      methylPREDNISolone (MEDROL DOSEPACK) 4 mg tablet Take as directed, Normal      montelukast (SINGULAIR) 10 mg tablet Take 1 tablet (10 mg total) by mouth every evening., Starting Tue 8/13/2019, Until Thu 9/12/2019, Normal      promethazine-dextromethorphan (PROMETHAZINE-DM) 6.25-15 mg/5 mL Syrp Take 5 mLs by mouth nightly as needed (cough)., Starting Tue 8/13/2019, Until Fri 8/23/2019, Normal         CONTINUE these medications which have NOT CHANGED    Details   ARIPiprazole (ABILIFY) 5 MG Tab Take 1 tablet (5 mg total) by mouth once daily., Starting Thu 4/25/2019, Normal      aspirin (ECOTRIN) 81 MG EC tablet Take 1 tablet (81 mg total) by mouth once daily., Starting Thu 7/19/2018, Until Fri 7/19/2019, OTC      atorvastatin (LIPITOR) 80 MG tablet Take 1 tablet (80 mg total) by mouth once daily., Starting Thu 7/19/2018, Until Fri 7/19/2019, Normal      diltiaZEM (CARDIZEM CD) 240 MG 24 hr capsule Take 1 capsule (240 mg total) by mouth once daily., Starting Fri 3/1/2019, Until Sat 2/29/2020, Normal      divalproex ER (DEPAKOTE) 500 MG Tb24 Take 1 tablet (500 mg total) by mouth 3 (three) times daily., Starting Thu  4/25/2019, Normal      ondansetron (ZOFRAN-ODT) 4 MG TbDL Take 1 tablet (4 mg total) by mouth every 8 (eight) hours as needed (nausea)., Starting Sat 8/11/2018, Print                   Patient discharged to home in stable condition with instructions to:   1. Please take all meds as prescribed.  2. Follow-up with your primary care doctor   3. Return precautions discussed and patient and/or family/caretaker understands to return to the emergency room for any concerns including worsening of your current symptoms, fever, chills, night sweats, worsening pain, chest pain, shortness of breath, nausea, vomiting, diarrhea, bleeding, headache, difficulty talking, visual disturbances, weakness, numbness or any other acute concerns       Clinical Impression:     1. Bronchitis    2. Lightheadedness    3. Cough                                   Mari oJe MD  08/14/19 [id number]

## 2019-10-01 NOTE — Clinical Note
Assumed care of pt for task only. Spoke with hospitalist Dr. Andreia Pollack to clarify admission orders. Per Dr. Andreia Pollack, the Solumedrol 40 mg will be given at 0600 due to the patient receiving 10 mg of Decadron, and the labs that were ordered have been changed to morning labs. The patient is to have the influenza A&B test and the hemoglobin A1C test.       Alonso Dc has been notified of the of the hemoglobin A1Cl; primary RN informed of the changes to medication orders as made by Dr. Andreia Pollack, as well as the changes to the lab orders. EP catheter removed from the right atrium.

## 2019-10-18 ENCOUNTER — OFFICE VISIT (OUTPATIENT)
Dept: PSYCHIATRY | Facility: CLINIC | Age: 34
End: 2019-10-18
Payer: COMMERCIAL

## 2019-10-18 VITALS
WEIGHT: 295.88 LBS | SYSTOLIC BLOOD PRESSURE: 123 MMHG | HEART RATE: 86 BPM | DIASTOLIC BLOOD PRESSURE: 79 MMHG | HEIGHT: 76 IN | BODY MASS INDEX: 36.03 KG/M2

## 2019-10-18 DIAGNOSIS — F41.9 ANXIETY DISORDER, UNSPECIFIED TYPE: Primary | ICD-10-CM

## 2019-10-18 DIAGNOSIS — F31.9 BIPOLAR 1 DISORDER: Chronic | ICD-10-CM

## 2019-10-18 PROCEDURE — 99999 PR PBB SHADOW E&M-EST. PATIENT-LVL III: CPT | Mod: PBBFAC,,, | Performed by: NURSE PRACTITIONER

## 2019-10-18 PROCEDURE — 99999 PR PBB SHADOW E&M-EST. PATIENT-LVL III: ICD-10-PCS | Mod: PBBFAC,,, | Performed by: NURSE PRACTITIONER

## 2019-10-18 PROCEDURE — 99213 PR OFFICE/OUTPT VISIT, EST, LEVL III, 20-29 MIN: ICD-10-PCS | Mod: S$GLB,,, | Performed by: NURSE PRACTITIONER

## 2019-10-18 PROCEDURE — 99213 OFFICE O/P EST LOW 20 MIN: CPT | Mod: S$GLB,,, | Performed by: NURSE PRACTITIONER

## 2019-10-18 RX ORDER — DIVALPROEX SODIUM 500 MG/1
500 TABLET, FILM COATED, EXTENDED RELEASE ORAL 3 TIMES DAILY
Qty: 270 TABLET | Refills: 3 | Status: SHIPPED | OUTPATIENT
Start: 2019-10-18 | End: 2020-07-24 | Stop reason: SDUPTHER

## 2019-10-18 RX ORDER — BUSPIRONE HYDROCHLORIDE 10 MG/1
10 TABLET ORAL 3 TIMES DAILY PRN
Qty: 90 TABLET | Refills: 11 | Status: SHIPPED | OUTPATIENT
Start: 2019-10-18 | End: 2020-10-17

## 2019-10-18 NOTE — PROGRESS NOTES
"Outpatient Psychiatry Follow-Up Visit (MD/NP)    10/18/2019    Clinical Status of Patient:  Outpatient (Ambulatory)    Chief Complaint:  Vel Ordonez is a 34 y.o. male who presents today for follow-up of mood disorder.  Met with patient.      Last visit was: 4/25/19. Chart and  reviewed.     Interval History and Content of Current Session:  Current Psychiatric Medications/changes  · Continue Depakote  mg po TID  · Start Abilify 5 mg po daily    Pt reports that he stopped Abilify because of side effects (felt restless). Feels stable on current medication (Depakote). Last Valproic level therapeutic (72.0) in February. Will reorder next visit.   Sleep and apeptite intact.  Reports "feeling anxious when I get upset or angry".  Reports his job is a major trigger and requesting a PRN medication. Will try Bupar.  Denies SI/HI/AVH.     Psychotherapy:  · Target symptoms: mood swings, mood disorder  · Why chosen therapy is appropriate versus another modality: relevant to diagnosis  · Outcome monitoring methods: self-report  · Therapeutic intervention type: insight oriented psychotherapy  · Topics discussed/themes: building skills sets for symptom management, symptom recognition  · The patient's response to the intervention is accepting. The patient's progress toward treatment goals is good.   · Duration of intervention: 11 minutes.    Review of Systems   · PSYCHIATRIC: Pertinant items are noted in the narrative.  · CONSTITUTIONAL: No weight gain or loss.   · MUSCULOSKELETAL: No pain or stiffness of the joints.  · NEUROLOGIC: No weakness, sensory changes, seizures, confusion, memory loss, tremor or other abnormal movements.  · ENDOCRINE: No polydipsia or polyuria.  · INTEGUMENTARY: No rashes or lacerations.  · EYES: No exophthalmos, jaundice or blindness.  · ENT: No dizziness, tinnitus or hearing loss.  · RESPIRATORY: No shortness of breath.  · CARDIOVASCULAR: No tachycardia or chest " "pain.  · GASTROINTESTINAL: No nausea, vomiting, pain, constipation or diarrhea.  · GENITOURINARY: No frequency, dysuria or sexual dysfunction.  · HEMATOLOGIC/LYMPHATIC: No excessive bleeding, prolonged or excessive bleeding after dental extraction/injury.  · ALLERGIC/IMMUNOLOGIC: No allergic response to materials, foods or animals at this time.    Past Medical, Family and Social History: The patient's past medical, family and social history have been reviewed and updated as appropriate within the electronic medical record - see encounter notes.    Compliance: yes    Side effects: None    Risk Parameters:  Patient reports no suicidal ideation  Patient reports no homicidal ideation  Patient reports no self-injurious behavior  Patient reports no violent behavior    Exam (detailed: at least 9 elements; comprehensive: all 15 elements)   Constitutional  Vitals:  Most recent vital signs, dated greater than 90 days prior to this appointment, were reviewed.   Vitals:    10/18/19 1411   BP: 123/79   Pulse: 86   Weight: 134.2 kg (295 lb 13.7 oz)   Height: 6' 4" (1.93 m)        General:  unremarkable, age appropriate     Musculoskeletal  Muscle Strength/Tone:  no tremor, no tic   Gait & Station:  non-ataxic     Psychiatric  Speech:  no latency; no press   Mood & Affect:  euthymic  congruent and appropriate   Thought Process:  normal and logical   Associations:  intact   Thought Content:  normal, no suicidality, no homicidality, delusions, or paranoia   Insight:  intact   Judgement: behavior is adequate to circumstances   Orientation:  grossly intact   Memory: intact for content of interview   Language: grossly intact   Attention Span & Concentration:  able to focus   Fund of Knowledge:  intact and appropriate to age and level of education     Assessment and Diagnosis   Status/Progress: Based on the examination today, the patient's problem(s) is/are adequately but not ideally controlled.  New problems have not been presented " today.   Co-morbidities and Lack of compliance are not complicating management of the primary condition.  There are no active rule-out diagnoses for this patient at this time.     General Impression:       ICD-10-CM ICD-9-CM   1. Anxiety disorder, unspecified type F41.9 300.00   2. Bipolar 1 disorder F31.9 296.7       Intervention/Counseling/Treatment Plan   · Medication Management: Continue current medications. The risks and benefits of medication were discussed with the patient.  · Continue Depakote  mg po TID  · DC Abilify 5 mg po daily  · Start Buspar 10 mg po TID PRN anxiety    Return to Clinic: 6 months     Risks, benefits, side effects and alternative treatments discussed with patient. Patient agrees with the current plan as documented.  Encouraged Patient to keep future appointments.  Take medications as prescribed and abstain from substance abuse.  Pt to present to ED for thoughts to harm himself or others

## 2019-10-21 ENCOUNTER — IMMUNIZATION (OUTPATIENT)
Dept: PHARMACY | Facility: CLINIC | Age: 34
End: 2019-10-21
Payer: COMMERCIAL

## 2020-07-08 ENCOUNTER — LAB VISIT (OUTPATIENT)
Dept: PRIMARY CARE CLINIC | Facility: OTHER | Age: 35
End: 2020-07-08
Attending: INTERNAL MEDICINE
Payer: COMMERCIAL

## 2020-07-08 DIAGNOSIS — Z03.818 ENCOUNTER FOR OBSERVATION FOR SUSPECTED EXPOSURE TO OTHER BIOLOGICAL AGENTS RULED OUT: ICD-10-CM

## 2020-07-08 PROCEDURE — U0003 INFECTIOUS AGENT DETECTION BY NUCLEIC ACID (DNA OR RNA); SEVERE ACUTE RESPIRATORY SYNDROME CORONAVIRUS 2 (SARS-COV-2) (CORONAVIRUS DISEASE [COVID-19]), AMPLIFIED PROBE TECHNIQUE, MAKING USE OF HIGH THROUGHPUT TECHNOLOGIES AS DESCRIBED BY CMS-2020-01-R: HCPCS

## 2020-07-11 LAB — SARS-COV-2 RNA RESP QL NAA+PROBE: NEGATIVE

## 2020-07-16 PROCEDURE — 99284 EMERGENCY DEPT VISIT MOD MDM: CPT

## 2020-07-16 PROCEDURE — 99284 EMERGENCY DEPT VISIT MOD MDM: CPT | Mod: ,,, | Performed by: PHYSICIAN ASSISTANT

## 2020-07-16 PROCEDURE — 99284 PR EMERGENCY DEPT VISIT,LEVEL IV: ICD-10-PCS | Mod: ,,, | Performed by: PHYSICIAN ASSISTANT

## 2020-07-17 ENCOUNTER — HOSPITAL ENCOUNTER (EMERGENCY)
Facility: HOSPITAL | Age: 35
Discharge: HOME | End: 2020-07-17
Attending: EMERGENCY MEDICINE
Payer: COMMERCIAL

## 2020-07-17 VITALS
WEIGHT: 295 LBS | HEIGHT: 76 IN | BODY MASS INDEX: 35.92 KG/M2 | HEART RATE: 58 BPM | TEMPERATURE: 98 F | RESPIRATION RATE: 17 BRPM | SYSTOLIC BLOOD PRESSURE: 134 MMHG | OXYGEN SATURATION: 99 % | DIASTOLIC BLOOD PRESSURE: 97 MMHG

## 2020-07-17 DIAGNOSIS — R07.9 CHEST PAIN: Primary | ICD-10-CM

## 2020-07-17 LAB
ALBUMIN SERPL BCP-MCNC: 3.7 G/DL (ref 3.5–5.2)
ALP SERPL-CCNC: 75 U/L (ref 55–135)
ALT SERPL W/O P-5'-P-CCNC: 41 U/L (ref 10–44)
ANION GAP SERPL CALC-SCNC: 9 MMOL/L (ref 8–16)
AST SERPL-CCNC: 23 U/L (ref 10–40)
BASOPHILS # BLD AUTO: 0.06 K/UL (ref 0–0.2)
BASOPHILS NFR BLD: 0.6 % (ref 0–1.9)
BILIRUB SERPL-MCNC: 0.3 MG/DL (ref 0.1–1)
BNP SERPL-MCNC: 12 PG/ML (ref 0–99)
BUN SERPL-MCNC: 11 MG/DL (ref 6–20)
CALCIUM SERPL-MCNC: 9.1 MG/DL (ref 8.7–10.5)
CHLORIDE SERPL-SCNC: 105 MMOL/L (ref 95–110)
CO2 SERPL-SCNC: 25 MMOL/L (ref 23–29)
CREAT SERPL-MCNC: 0.8 MG/DL (ref 0.5–1.4)
DIFFERENTIAL METHOD: ABNORMAL
EOSINOPHIL # BLD AUTO: 0.2 K/UL (ref 0–0.5)
EOSINOPHIL NFR BLD: 1.4 % (ref 0–8)
ERYTHROCYTE [DISTWIDTH] IN BLOOD BY AUTOMATED COUNT: 12.9 % (ref 11.5–14.5)
EST. GFR  (AFRICAN AMERICAN): >60 ML/MIN/1.73 M^2
EST. GFR  (NON AFRICAN AMERICAN): >60 ML/MIN/1.73 M^2
GLUCOSE SERPL-MCNC: 113 MG/DL (ref 70–110)
HCT VFR BLD AUTO: 45.8 % (ref 40–54)
HGB BLD-MCNC: 14.9 G/DL (ref 14–18)
IMM GRANULOCYTES # BLD AUTO: 0.04 K/UL (ref 0–0.04)
IMM GRANULOCYTES NFR BLD AUTO: 0.4 % (ref 0–0.5)
LYMPHOCYTES # BLD AUTO: 2.6 K/UL (ref 1–4.8)
LYMPHOCYTES NFR BLD: 24.2 % (ref 18–48)
MCH RBC QN AUTO: 31.2 PG (ref 27–31)
MCHC RBC AUTO-ENTMCNC: 32.5 G/DL (ref 32–36)
MCV RBC AUTO: 96 FL (ref 82–98)
MONOCYTES # BLD AUTO: 1.1 K/UL (ref 0.3–1)
MONOCYTES NFR BLD: 10.8 % (ref 4–15)
NEUTROPHILS # BLD AUTO: 6.6 K/UL (ref 1.8–7.7)
NEUTROPHILS NFR BLD: 62.6 % (ref 38–73)
NRBC BLD-RTO: 0 /100 WBC
PLATELET # BLD AUTO: 269 K/UL (ref 150–350)
PMV BLD AUTO: 10.4 FL (ref 9.2–12.9)
POTASSIUM SERPL-SCNC: 3.7 MMOL/L (ref 3.5–5.1)
PROT SERPL-MCNC: 7.3 G/DL (ref 6–8.4)
RBC # BLD AUTO: 4.78 M/UL (ref 4.6–6.2)
SODIUM SERPL-SCNC: 139 MMOL/L (ref 136–145)
TROPONIN I SERPL DL<=0.01 NG/ML-MCNC: <0.006 NG/ML (ref 0–0.03)
WBC # BLD AUTO: 10.58 K/UL (ref 3.9–12.7)

## 2020-07-17 PROCEDURE — 93005 ELECTROCARDIOGRAM TRACING: CPT

## 2020-07-17 PROCEDURE — 83880 ASSAY OF NATRIURETIC PEPTIDE: CPT

## 2020-07-17 PROCEDURE — 93010 EKG 12-LEAD: ICD-10-PCS | Mod: ,,, | Performed by: INTERNAL MEDICINE

## 2020-07-17 PROCEDURE — 93010 ELECTROCARDIOGRAM REPORT: CPT | Mod: ,,, | Performed by: INTERNAL MEDICINE

## 2020-07-17 PROCEDURE — 80053 COMPREHEN METABOLIC PANEL: CPT

## 2020-07-17 PROCEDURE — 25000003 PHARM REV CODE 250: Performed by: PHYSICIAN ASSISTANT

## 2020-07-17 PROCEDURE — 85025 COMPLETE CBC W/AUTO DIFF WBC: CPT

## 2020-07-17 PROCEDURE — 84484 ASSAY OF TROPONIN QUANT: CPT

## 2020-07-17 RX ORDER — ASPIRIN 325 MG
325 TABLET, DELAYED RELEASE (ENTERIC COATED) ORAL
Status: COMPLETED | OUTPATIENT
Start: 2020-07-17 | End: 2020-07-17

## 2020-07-17 RX ADMIN — ASPIRIN 325 MG: 325 TABLET, COATED ORAL at 01:07

## 2020-07-17 NOTE — ED NOTES
Pt c/o intermittent LUE parasthesia. Pt not currently having symptoms. Pt denies any current symptoms.

## 2020-07-17 NOTE — DISCHARGE INSTRUCTIONS
Diagnosis: chest pain    Tests you had showed: EKG and labs did not show a heart attack.    Home Care Instructions:  - Continue taking your home medications as prescribed    Take ibuprofen (also called Advil, Motrin) for your pain. This medicine is available over-the-counter in 200 mg tablets.  - You may take 600 mg every 6 hours, or 800 mg every 8 hours as needed   - Do not take more than this amount, as it can cause kidney problems, bleeding in your stomach, and other serious problems.   - Do not also take naproxen (Aleve) at the same time or on the same day  - If you have heart problems or uncontrolled high blood pressure, you should not take ibuprofen for more than 3 days without discussing with your doctor    If your pain is not controlled with ibuprofen, you may also take acetaminophen (also called Tylenol).  - You may take up to 1,000 mg of Tylenol every 6 hours as needed  - Do not take more than 4,000 mg in 24 hours (1 day) as this may cause liver damage  - Many other medicines include acetaminophen (Tylenol) such as: Norco, Vicodin, Tylenol #3, many cold medicines, etc.  - Please read all labels carefully and do not combine medicines that include acetaminophen.  - If you have a history of liver disease or drink alcohol heavily, do not take acetaminophen (Tylenol) since it can damage your liver    Follow-Up Plan:  - Follow-up with: Primary care doctor within 3 - 5 days  - Follow-up for additional testing and/or evaluation as directed by your primary doctor  - Your baseline EKG is abnormal.  I recommend that you follow-up with a cardiologist.    Return to the Emergency Department for symptoms including but not limited to: worsening symptoms, shortness of breath or chest pain, vomiting with inability to hold down fluids, fevers greater than 100.4°F, dizziness, passing out/fainting/unconsciousness, or other concerning symptoms.

## 2020-07-17 NOTE — PROVIDER PROGRESS NOTES - EMERGENCY DEPT.
EKG with some biphasic T-waves in V3 through V6 which are new, and an old right bundle-branch block.  No STEMI.    Presentation with the above described chest pain.     Differential diagnosis includes but is not limited to: ACS, pneumonia, pneumothorax, pericarditis, intra-abdominal process.     - EKG and history do not support the diagnosis of pericarditis.   - There is no evidence of pneumothorax or infiltrate on CXR.    Given the current presentation, aortic dissection is felt unlikely.  - History, CXR, and exam do not suggest pulmonary edema/congestive heart failure   - Doubt pulmonary embolism or aortic dissection based on history and exam.  - Intra-abdominal pathology felt unlikely given benign/non tender abdominal exam.   - Acute coronary syndrome considered but there are negative biomarkers, no acute ischemic EKG changes, and the patient has a low HEART score. Based on this, I feel that there is low risk for short-term major adverse cardiac event.     I have discussed this with the patient and reviewed options for inpatient and outpatient management. The patient verbalizes an excellent understanding of the above including presence of small risk of short-term major adverse cardiac event even in the setting of low HEART score, negative cardiac biomarker, and compendium of elements of this presentation. The patient wishes to pursue further workup on as an outpatient.

## 2020-07-17 NOTE — ED PROVIDER NOTES
Encounter Date: 7/16/2020       History     Chief Complaint   Patient presents with    Extremity Weakness     Reports intermittent LUE parasthesia. Denies neck, back pain, injury, HA. Denies symptoms at this time.      The patient is a 34 year old male, who has a past medical history of A flutter, SVT, mental illness, obesity, and family history of CAD, presents to the ER for an emergent evaluation due to episodes of chest pain for the past 3 weeks. He reports having episodes of left sided chest pain with numbness to his left arm. He states that the episodes last 3-4 minutes, then go away. He states that it usually happens while he is driving. He states that he had an episodes around 11 am today and another around 4 pm. He states that the pain is moderate in degree. He denies any mitigating or exacerbating factors. He denies any additional symptoms. He denies any pre-arrival treatment.         Review of patient's allergies indicates:  No Known Allergies  Past Medical History:   Diagnosis Date    Atrial flutter     Bipolar 1 disorder 2011    Depression     Hx of psychiatric care     Hypertension     Psychiatric problem     Supraventricular tachycardia     Therapy      Past Surgical History:   Procedure Laterality Date    ABLATION N/A 9/17/2018    Procedure: ABLATION;  Surgeon: Demian Davis MD;  Location: [facility] CATH LAB;  Service: Cardiology;  Laterality: N/A;  SVT, RFA, CHELSIE, MAC, DM, 3 PREP     Family History   Problem Relation Age of Onset    Cancer Mother     Heart disease Father     Diabetes Father      Social History     Tobacco Use    Smoking status: Never Smoker    Smokeless tobacco: Never Used   Substance Use Topics    Alcohol use: No    Drug use: No     Review of Systems   Constitutional: Negative for activity change, appetite change, chills, diaphoresis, fatigue and fever.   HENT: Negative for congestion, rhinorrhea and sore throat.    Eyes: Negative for pain and visual disturbance.    Respiratory: Positive for chest tightness. Negative for cough, shortness of breath and wheezing.    Cardiovascular: Positive for chest pain. Negative for palpitations and leg swelling.   Gastrointestinal: Negative for abdominal pain, diarrhea, nausea and vomiting.   Endocrine: Negative for polydipsia and polyuria.   Genitourinary: Negative for difficulty urinating and dysuria.   Musculoskeletal: Negative for arthralgias, back pain, gait problem, myalgias and neck pain.   Skin: Negative for color change and rash.   Allergic/Immunologic: Negative for immunocompromised state.   Neurological: Positive for numbness. Negative for dizziness, seizures, syncope, facial asymmetry, weakness, light-headedness and headaches.   Hematological: Negative for adenopathy.   Psychiatric/Behavioral: Negative for confusion.       Physical Exam     Initial Vitals [07/16/20 2320]   BP Pulse Resp Temp SpO2   (!) 168/101 70 17 98.1 °F (36.7 °C) 100 %      MAP       --         Physical Exam    Nursing note and vitals reviewed.  Constitutional: He appears well-developed and well-nourished. He is not diaphoretic. No distress.   HENT:   Head: Normocephalic.   Mouth/Throat: Oropharynx is clear and moist.   Eyes: Conjunctivae are normal. Pupils are equal, round, and reactive to light.   Neck: Normal range of motion. Neck supple. No JVD present.   Cardiovascular: Normal rate and intact distal pulses.   Pulmonary/Chest: Breath sounds normal. No respiratory distress.   Abdominal: Soft. He exhibits no distension. There is no abdominal tenderness. There is no rebound.   Musculoskeletal: Normal range of motion. No tenderness or edema.   Neurological: He is alert and oriented to person, place, and time. He has normal strength. No sensory deficit.   Skin: Skin is warm and dry. No rash noted. No erythema.   Psychiatric: He has a normal mood and affect. His behavior is normal.         ED Course   Procedures  Labs Reviewed   CBC W/ AUTO DIFFERENTIAL -  Abnormal; Notable for the following components:       Result Value    Mean Corpuscular Hemoglobin 31.2 (*)     Mono # 1.1 (*)     All other components within normal limits   COMPREHENSIVE METABOLIC PANEL - Abnormal; Notable for the following components:    Glucose 113 (*)     All other components within normal limits   TROPONIN I   B-TYPE NATRIURETIC PEPTIDE     Results for orders placed or performed during the hospital encounter of 07/17/20   CBC auto differential   Result Value Ref Range    WBC 10.58 3.90 - 12.70 K/uL    RBC 4.78 4.60 - 6.20 M/uL    Hemoglobin 14.9 14.0 - 18.0 g/dL    Hematocrit 45.8 40.0 - 54.0 %    Mean Corpuscular Volume 96 82 - 98 fL    Mean Corpuscular Hemoglobin 31.2 (H) 27.0 - 31.0 pg    Mean Corpuscular Hemoglobin Conc 32.5 32.0 - 36.0 g/dL    RDW 12.9 11.5 - 14.5 %    Platelets 269 150 - 350 K/uL    MPV 10.4 9.2 - 12.9 fL    Immature Granulocytes 0.4 0.0 - 0.5 %    Gran # (ANC) 6.6 1.8 - 7.7 K/uL    Immature Grans (Abs) 0.04 0.00 - 0.04 K/uL    Lymph # 2.6 1.0 - 4.8 K/uL    Mono # 1.1 (H) 0.3 - 1.0 K/uL    Eos # 0.2 0.0 - 0.5 K/uL    Baso # 0.06 0.00 - 0.20 K/uL    nRBC 0 0 /100 WBC    Gran% 62.6 38.0 - 73.0 %    Lymph% 24.2 18.0 - 48.0 %    Mono% 10.8 4.0 - 15.0 %    Eosinophil% 1.4 0.0 - 8.0 %    Basophil% 0.6 0.0 - 1.9 %    Differential Method Automated    Comprehensive metabolic panel   Result Value Ref Range    Sodium 139 136 - 145 mmol/L    Potassium 3.7 3.5 - 5.1 mmol/L    Chloride 105 95 - 110 mmol/L    CO2 25 23 - 29 mmol/L    Glucose 113 (H) 70 - 110 mg/dL    BUN, Bld 11 6 - 20 mg/dL    Creatinine 0.8 0.5 - 1.4 mg/dL    Calcium 9.1 8.7 - 10.5 mg/dL    Total Protein 7.3 6.0 - 8.4 g/dL    Albumin 3.7 3.5 - 5.2 g/dL    Total Bilirubin 0.3 0.1 - 1.0 mg/dL    Alkaline Phosphatase 75 55 - 135 U/L    AST 23 10 - 40 U/L    ALT 41 10 - 44 U/L    Anion Gap 9 8 - 16 mmol/L    eGFR if African American >60.0 >60 mL/min/1.73 m^2    eGFR if non African American >60.0 >60 mL/min/1.73 m^2    Troponin I   Result Value Ref Range    Troponin I <0.006 0.000 - 0.026 ng/mL   Brain natriuretic peptide   Result Value Ref Range    BNP 12 0 - 99 pg/mL          ECG Results          EKG 12-lead (In process)  Result time 07/17/20 08:26:45    In process by Interface, Lab In [facility] (07/17/20 08:26:45)                 Narrative:    Test Reason : R07.9,    Vent. Rate : 055 BPM     Atrial Rate : 055 BPM     P-R Int : 138 ms          QRS Dur : 118 ms      QT Int : 402 ms       P-R-T Axes : 046 -29 -16 degrees     QTc Int : 384 ms    Sinus bradycardia with sinus arrhythmia  LVH with QRS widening  Nonspecific T wave abnormality  Abnormal ECG  When compared with ECG of 13-AUG-2019 19:59,  QT has shortened    Referred By: [email or web address]   SELF           Confirmed By:                             Imaging Results          X-Ray Chest AP Portable (Final result)  Result time 07/17/20 01:27:29    Final result by Arnav Harris MD (07/17/20 01:27:29)                 Impression:      No acute findings in the chest.      Electronically signed by: Arnav Harris MD  Date:    07/17/2020  Time:    01:27             Narrative:    EXAMINATION:  XR CHEST AP PORTABLE    CLINICAL HISTORY:  Chest pain, unspecified    TECHNIQUE:  Single frontal view of the chest was performed.    COMPARISON:  August 13, 2019.    FINDINGS:  No consolidation, pleural effusion or pneumothorax.    Cardiomediastinal silhouette is unremarkable.                                 Medical Decision Making:   History:   Old Medical Records: I decided to obtain old medical records.  Initial Assessment:   33 yo male, who has hx of a flutter, SVT, obesity, and family history of CAD presents to the ER c/o episodes of left sided chest pain with left arm paresthesia x 3 weeks. 2 episodes today.   Differential Diagnosis:   ACS, dysrhythmia, palpitations, electrolyte abnormality, CHF, anxiety, pleurisy, costochondritis, pneumonia, pneumothorax, cervical radiculopathy,  pericarditis, etc    Clinical Tests:   Lab Tests: Ordered and Reviewed  Radiological Study: Ordered and Reviewed  Medical Tests: Ordered and Reviewed  ED Management:  I discussed the case in detail with the ER attending physician who will follow and make final disposition due to pending work up and end of my shift     Additional MDM:   EKG: I have independently interpreted EKG(s) - see notes.   X-Rays: I have independently interpreted X-Ray(s) - see notes.   Heart Score:    History:          Moderately suspicious.  ECG:             Nonspecific repolarisation disturbance  Age:               Less than 45 years  Risk factors: 1-2 risk factors           Attending Attestation:     Physician Attestation Statement for NP/PA:   I discussed this assessment and plan of this patient with the NP/PA, but I did not personally examine the patient. The face to face encounter was performed by the NP/PA.    Other NP/PA Attestation Additions:      Medical Decision Making: No STEMI noted on EKG.  Troponin negative.  I do not believe this is consistent with PE.  Discharged home in stable condition.  Return precautions discussed at bedside.                               Clinical Impression:       ICD-10-CM ICD-9-CM   1. Chest pain  R07.9 786.50             ED Disposition Condition    Discharge Stable        ED Prescriptions     None        Follow-up Information     Follow up With Specialties Details Why Contact Info Additional Information    Your primary care doctor  Schedule an appointment as soon as possible for a visit in 1 week       [facility] Cardiology Schedule an appointment as soon as possible for a visit in 1 week  [address]  [facility] [address]  [phone] 3rd floor                                     Mauro Perales PA-C  07/17/20 1706       Terri Mccain MD  07/17/20 5026

## 2020-07-17 NOTE — Clinical Note
Velkeven Mead Lisetaryn was seen and treated in our emergency department on 7/16/2020.  He may return to work on 07/18/2020.       If you have any questions or concerns, please don't hesitate to call.      Alicia Nelson RN

## 2020-07-21 DIAGNOSIS — I47.10 PSVT (PAROXYSMAL SUPRAVENTRICULAR TACHYCARDIA): Primary | ICD-10-CM

## 2020-07-24 ENCOUNTER — OFFICE VISIT (OUTPATIENT)
Dept: PSYCHIATRY | Facility: CLINIC | Age: 35
End: 2020-07-24
Payer: COMMERCIAL

## 2020-07-24 VITALS
BODY MASS INDEX: 36.28 KG/M2 | HEART RATE: 65 BPM | DIASTOLIC BLOOD PRESSURE: 90 MMHG | SYSTOLIC BLOOD PRESSURE: 139 MMHG | WEIGHT: 298.06 LBS

## 2020-07-24 DIAGNOSIS — F31.9 BIPOLAR 1 DISORDER: Primary | Chronic | ICD-10-CM

## 2020-07-24 DIAGNOSIS — F41.9 ANXIETY DISORDER, UNSPECIFIED TYPE: ICD-10-CM

## 2020-07-24 PROCEDURE — 99999 PR PBB SHADOW E&M-EST. PATIENT-LVL III: ICD-10-PCS | Mod: PBBFAC,,, | Performed by: NURSE PRACTITIONER

## 2020-07-24 PROCEDURE — 99213 PR OFFICE/OUTPT VISIT, EST, LEVL III, 20-29 MIN: ICD-10-PCS | Mod: S$GLB,,, | Performed by: NURSE PRACTITIONER

## 2020-07-24 PROCEDURE — 99213 OFFICE O/P EST LOW 20 MIN: CPT | Mod: S$GLB,,, | Performed by: NURSE PRACTITIONER

## 2020-07-24 PROCEDURE — 99999 PR PBB SHADOW E&M-EST. PATIENT-LVL III: CPT | Mod: PBBFAC,,, | Performed by: NURSE PRACTITIONER

## 2020-07-24 RX ORDER — DIVALPROEX SODIUM 500 MG/1
500 TABLET, FILM COATED, EXTENDED RELEASE ORAL 3 TIMES DAILY
Qty: 270 TABLET | Refills: 3 | Status: SHIPPED | OUTPATIENT
Start: 2020-07-24 | End: 2021-04-16 | Stop reason: SDUPTHER

## 2020-07-24 NOTE — PROGRESS NOTES
Outpatient Psychiatry Follow-Up Visit (MD/NP)    7/24/2020    Clinical Status of Patient:  Outpatient (Ambulatory)    Chief Complaint:  Vel Ordonez is a 34 y.o. male who presents today for follow-up of mood disorder.  Met with patient.      Last visit was: 10/18/19. Chart and  reviewed.     Interval History and Content of Current Session:  Current Psychiatric Medications/changes  · Continue Depakote  mg po TID  · DC Abilify 5 mg po daily  · Start Buspar 10 mg po TID PRN anxiety    Pt reports that he quit  his job this week because they wrote him up for missing work due to medical emergency.  Pt was working for a [email or web address] shop.  Never took buspar for anxiety.  Planning to get back on Medicaid to pay for his medication. Denies symptoms of renetta. Denies SI/HI/AVH.     Psychotherapy:  · Target symptoms: mood swings, mood disorder  · Why chosen therapy is appropriate versus another modality: relevant to diagnosis  · Outcome monitoring methods: self-report  · Therapeutic intervention type: insight oriented psychotherapy  · Topics discussed/themes: building skills sets for symptom management, symptom recognition  · The patient's response to the intervention is accepting. The patient's progress toward treatment goals is good.   · Duration of intervention: 11 minutes.    Review of Systems   · PSYCHIATRIC: Pertinant items are noted in the narrative.  · CONSTITUTIONAL: No weight gain or loss.   · MUSCULOSKELETAL: No pain or stiffness of the joints.  · NEUROLOGIC: No weakness, sensory changes, seizures, confusion, memory loss, tremor or other abnormal movements.  · ENDOCRINE: No polydipsia or polyuria.  · INTEGUMENTARY: No rashes or lacerations.  · EYES: No exophthalmos, jaundice or blindness.  · ENT: No dizziness, tinnitus or hearing loss.  · RESPIRATORY: No shortness of breath.  · CARDIOVASCULAR: No tachycardia or chest pain.  · GASTROINTESTINAL: No nausea, vomiting, pain, constipation or  diarrhea.  · GENITOURINARY: No frequency, dysuria or sexual dysfunction.  · HEMATOLOGIC/LYMPHATIC: No excessive bleeding, prolonged or excessive bleeding after dental extraction/injury.  · ALLERGIC/IMMUNOLOGIC: No allergic response to materials, foods or animals at this time.    Past Medical, Family and Social History: The patient's past medical, family and social history have been reviewed and updated as appropriate within the electronic medical record - see encounter notes.    Compliance: yes    Side effects: None    Risk Parameters:  Patient reports no suicidal ideation  Patient reports no homicidal ideation  Patient reports no self-injurious behavior  Patient reports no violent behavior    Exam (detailed: at least 9 elements; comprehensive: all 15 elements)   Constitutional  Vitals:  Most recent vital signs, dated greater than 90 days prior to this appointment, were reviewed.   Vitals:    07/24/20 1559   BP: (!) 139/90   Pulse: 65   Weight: 135.2 kg (298 lb 1 oz)        General:  unremarkable, age appropriate     Musculoskeletal  Muscle Strength/Tone:  no tremor, no tic   Gait & Station:  non-ataxic     Psychiatric  Speech:  no latency; no press   Mood & Affect:  euthymic  congruent and appropriate   Thought Process:  normal and logical   Associations:  intact   Thought Content:  normal, no suicidality, no homicidality, delusions, or paranoia   Insight:  intact   Judgement: behavior is adequate to circumstances   Orientation:  grossly intact   Memory: intact for content of interview   Language: grossly intact   Attention Span & Concentration:  able to focus   Fund of Knowledge:  intact and appropriate to age and level of education     Assessment and Diagnosis   Status/Progress: Based on the examination today, the patient's problem(s) is/are adequately but not ideally controlled.  New problems have not been presented today.   Co-morbidities and Lack of compliance are not complicating management of the primary  condition.  There are no active rule-out diagnoses for this patient at this time.     General Impression:       ICD-10-CM ICD-9-CM   1. Bipolar 1 disorder  F31.9 296.7   2. Anxiety disorder, unspecified type  F41.9 300.00       Intervention/Counseling/Treatment Plan   · Medication Management: Continue current medications. The risks and benefits of medication were discussed with the patient.  · Continue Depakote  mg po TID  · DC Buspar 10 mg po (never took medication)    Return to Clinic: 6 months     Risks, benefits, side effects and alternative treatments discussed with patient. Patient agrees with the current plan as documented.  Encouraged Patient to keep future appointments.  Take medications as prescribed and abstain from substance abuse.  Pt to present to ED for thoughts to harm himself or others

## 2020-07-29 ENCOUNTER — OFFICE VISIT (OUTPATIENT)
Dept: ELECTROPHYSIOLOGY | Facility: CLINIC | Age: 35
End: 2020-07-29
Payer: COMMERCIAL

## 2020-07-29 ENCOUNTER — HOSPITAL ENCOUNTER (OUTPATIENT)
Dept: CARDIOLOGY | Facility: CLINIC | Age: 35
Discharge: HOME | End: 2020-07-29
Payer: COMMERCIAL

## 2020-07-29 VITALS
BODY MASS INDEX: 35.65 KG/M2 | SYSTOLIC BLOOD PRESSURE: 150 MMHG | RESPIRATION RATE: 18 BRPM | OXYGEN SATURATION: 99 % | HEART RATE: 54 BPM | WEIGHT: 292.75 LBS | DIASTOLIC BLOOD PRESSURE: 95 MMHG | HEIGHT: 76 IN

## 2020-07-29 DIAGNOSIS — I47.10 PSVT (PAROXYSMAL SUPRAVENTRICULAR TACHYCARDIA): ICD-10-CM

## 2020-07-29 DIAGNOSIS — I47.10 SVT (SUPRAVENTRICULAR TACHYCARDIA): ICD-10-CM

## 2020-07-29 DIAGNOSIS — R07.9 CHEST PAIN, UNSPECIFIED TYPE: Primary | ICD-10-CM

## 2020-07-29 DIAGNOSIS — I47.10 PAROXYSMAL SVT (SUPRAVENTRICULAR TACHYCARDIA): ICD-10-CM

## 2020-07-29 PROCEDURE — 93005 ELECTROCARDIOGRAM TRACING: CPT | Mod: S$GLB,,, | Performed by: INTERNAL MEDICINE

## 2020-07-29 PROCEDURE — 93010 ELECTROCARDIOGRAM REPORT: CPT | Mod: S$GLB,,, | Performed by: INTERNAL MEDICINE

## 2020-07-29 PROCEDURE — 93005 RHYTHM STRIP: ICD-10-PCS | Mod: S$GLB,,, | Performed by: INTERNAL MEDICINE

## 2020-07-29 PROCEDURE — 99999 PR PBB SHADOW E&M-EST. PATIENT-LVL IV: CPT | Mod: PBBFAC,,, | Performed by: INTERNAL MEDICINE

## 2020-07-29 PROCEDURE — 99214 PR OFFICE/OUTPT VISIT, EST, LEVL IV, 30-39 MIN: ICD-10-PCS | Mod: S$GLB,,, | Performed by: INTERNAL MEDICINE

## 2020-07-29 PROCEDURE — 99214 OFFICE O/P EST MOD 30 MIN: CPT | Mod: S$GLB,,, | Performed by: INTERNAL MEDICINE

## 2020-07-29 PROCEDURE — 99999 PR PBB SHADOW E&M-EST. PATIENT-LVL IV: ICD-10-PCS | Mod: PBBFAC,,, | Performed by: INTERNAL MEDICINE

## 2020-07-29 PROCEDURE — 93010 RHYTHM STRIP: ICD-10-PCS | Mod: S$GLB,,, | Performed by: INTERNAL MEDICINE

## 2020-07-29 RX ORDER — CHLORTHALIDONE 25 MG/1
25 TABLET ORAL DAILY
Qty: 30 TABLET | Refills: 11 | Status: SHIPPED | OUTPATIENT
Start: 2020-07-29 | End: 2021-07-29

## 2020-07-29 NOTE — PROGRESS NOTES
Reason for visit: Post ED discharge     HPI:     Vel Ordonez is a 34 y.o. male with arrhythmic history significant for SVT s/p slow pathway modification and CTI line ablation for typical flutter who presents here after ED discharge. He had been relatively well from cardiac standpoint until a about 1 month ago when he started to experience occasional chest pain without any provocation that last a few minutes and then resolves. He eventually went to the ED , 7/17 and ACS was ruled out with negative biomarkers. Patient states that he was told to follow up with his cardiologist and hence his visit. He denies any additional episodes of chest pain after his ED visit. Regarding his arrhythmic history, he has been well since his CTI line in 1/2019 and has not has episodes of palpitations, dizziness or syncope. He is functional at baseline and able to perform all his ADLs but admits to dyspnea with moderate exertion such as going to flights of stairs. He is no longer on any medications and has been off anticoagulation as well. He has never had a left heart cath or a stress test. His dad has early CAD in the 40S. He is currently unemployed, denies smoking, heavy alcohol use or recreational drug use. Of note, his last visit to EP clinic was back in 1/2019.      ECG reviewed today - sinus bradycardia, LVH with secondary repol abnormalities.     Pertinent cardiac history:  -CTI RFA which went without complications-1/2019.  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 Not inducible for any arrhythmia afterward.  -12/1/2018 he went to the ED with midsternal chest pain at rest. Troponins negative. It was deemed unlikely cardiac and he was sent home.  -Echo in 2018 demonstrated normal LVEF         Past Medical History:   Diagnosis Date    Atrial flutter     Bipolar 1 disorder 2011    Depression     Hx of psychiatric care     Hypertension     Psychiatric problem     Supraventricular  tachycardia     Therapy         Social History     Socioeconomic History    Marital status: Single     Spouse name: Not on file    Number of children: Not on file    Years of education: Not on file    Highest education level: Not on file   Occupational History    Not on file   Social Needs    Financial resource strain: Not on file    Food insecurity     Worry: Not on file     Inability: Not on file    Transportation needs     Medical: Not on file     Non-medical: Not on file   Tobacco Use    Smoking status: Never Smoker    Smokeless tobacco: Never Used   Substance and Sexual Activity    Alcohol use: No    Drug use: No    Sexual activity: Yes     Partners: Female   Lifestyle    Physical activity     Days per week: Not on file     Minutes per session: Not on file    Stress: Not on file   Relationships    Social connections     Talks on phone: Not on file     Gets together: Not on file     Attends Muslim service: Not on file     Active member of club or organization: Not on file     Attends meetings of clubs or organizations: Not on file     Relationship status: Not on file   Other Topics Concern    Patient feels they ought to cut down on drinking/drug use No    Patient annoyed by others criticizing their drinking/drug use No    Patient has felt bad or guilty about drinking/drug use No    Patient has had a drink/used drugs as an eye opener in the AM No   Social History Narrative    Not on file        Family History   Problem Relation Age of Onset    Cancer Mother     Heart disease Father     Diabetes Father         Current Outpatient Medications   Medication Sig Dispense Refill    divalproex ER (DEPAKOTE) 500 MG Tb24 Take 1 tablet (500 mg total) by mouth 3 (three) times daily. 270 tablet 3    albuterol (PROVENTIL/VENTOLIN HFA) 90 mcg/actuation inhaler Inhale 2 puffs into the lungs every 6 (six) hours as needed for Wheezing or Shortness of Breath. 1 Inhaler 0    aspirin (ECOTRIN) 81 MG EC  "tablet Take 1 tablet (81 mg total) by mouth once daily.  0    atorvastatin (LIPITOR) 80 MG tablet Take 1 tablet (80 mg total) by mouth once daily. 90 tablet 3    busPIRone (BUSPAR) 10 MG tablet Take 1 tablet (10 mg total) by mouth 3 (three) times daily as needed. 90 tablet 11    diltiaZEM (CARDIZEM CD) 240 MG 24 hr capsule Take 1 capsule (240 mg total) by mouth once daily. 90 capsule 3    famotidine (PEPCID) 20 MG tablet Take 1 tablet (20 mg total) by mouth 2 (two) times daily. 20 tablet 0    fluticasone propionate (FLONASE) 50 mcg/actuation nasal spray 2 sprays (100 mcg total) by Each Nostril route once daily. 16 g 0    loratadine (CLARITIN) 10 mg tablet Take 1 tablet (10 mg total) by mouth once daily. 60 tablet 0    ondansetron (ZOFRAN-ODT) 4 MG TbDL Take 1 tablet (4 mg total) by mouth every 8 (eight) hours as needed (nausea). 12 tablet 0     No current facility-administered medications for this visit.       ROS:  Constitutional: (-)fevers, (-)chills, (-)night sweats  HEENT: (+) headaches (-) lightheadedness (-) blurry vision, (-) nose bleeds   Cardiovascular: (+)chest pain (-)paroxysmal nocturnal dyspnea (-)orthopnea  Respiratory: (+)shortness of breath, (-)dyspnea on exertion (-)hemoptysis  Gastrointestinal: (-)abdominal pain (-)nausea (-)vomiting (-)hematemesis         (-)hematochezia (-)tarry stools  Musculoskeletal: (+)arthralgias (-)limited range of motion  Neurologic: (-)parasthesias (-)mood disorder (-)anxiety  Endo: (-)polyuria (-)polydipsia (-)heat/cold intolerance  Skin: (-)rash     Vitals:    07/29/20 1127   BP: (!) 150/95   Pulse: (!) 54   Resp: 18   SpO2: 99%   Weight: 132.8 kg (292 lb 12.3 oz)   Height: 6' 4" (1.93 m)     Physical Exam:   Gen: overweight male, no acute distress, pleasant patient answering questions appropriately  HEENT: extra-ocular muscles intact, normocephalic-atraumatic  Neck: no jugular venous distension, no lymphadenopathy, no thyromegaly, no carotid bruits  CVS: regular " rate and rhythm, S1S2 normal, no murmurs/rubs/gallops  CHEST: clear to auscultation bilaterally, no wheezes/rales/ronchi  ABD: Soft, non-tender, nondistended, bowel sounds present  EXT: No Edema, 2+ pulses bilaterally (radial, femoral, dorsales pedis, posterior tibial)  NEURO: awake, alert, and oriented   Skin: No rash     Review of Labs:   Lab Results   Component Value Date    WBC 10.58 07/17/2020    HGB 14.9 07/17/2020    HCT 45.8 07/17/2020    MCV 96 07/17/2020     07/17/2020       CMP  Sodium   Date Value Ref Range Status   07/17/2020 139 136 - 145 mmol/L Final     Potassium   Date Value Ref Range Status   07/17/2020 3.7 3.5 - 5.1 mmol/L Final     Chloride   Date Value Ref Range Status   07/17/2020 105 95 - 110 mmol/L Final     CO2   Date Value Ref Range Status   07/17/2020 25 23 - 29 mmol/L Final     Glucose   Date Value Ref Range Status   07/17/2020 113 (H) 70 - 110 mg/dL Final     BUN, Bld   Date Value Ref Range Status   07/17/2020 11 6 - 20 mg/dL Final     Creatinine   Date Value Ref Range Status   07/17/2020 0.8 0.5 - 1.4 mg/dL Final     Calcium   Date Value Ref Range Status   07/17/2020 9.1 8.7 - 10.5 mg/dL Final     Total Protein   Date Value Ref Range Status   07/17/2020 7.3 6.0 - 8.4 g/dL Final     Albumin   Date Value Ref Range Status   07/17/2020 3.7 3.5 - 5.2 g/dL Final     Total Bilirubin   Date Value Ref Range Status   07/17/2020 0.3 0.1 - 1.0 mg/dL Final     Comment:     For infants and newborns, interpretation of results should be based  on gestational age, weight and in agreement with clinical  observations.  Premature Infant recommended reference ranges:  Up to 24 hours.............<8.0 mg/dL  Up to 48 hours............<12.0 mg/dL  3-5 days..................<15.0 mg/dL  6-29 days.................<15.0 mg/dL       Alkaline Phosphatase   Date Value Ref Range Status   07/17/2020 75 55 - 135 U/L Final     AST   Date Value Ref Range Status   07/17/2020 23 10 - 40 U/L Final     ALT   Date  Value Ref Range Status   07/17/2020 41 10 - 44 U/L Final     Anion Gap   Date Value Ref Range Status   07/17/2020 9 8 - 16 mmol/L Final     eGFR if    Date Value Ref Range Status   07/17/2020 >60.0 >60 mL/min/1.73 m^2 Final     eGFR if non    Date Value Ref Range Status   07/17/2020 >60.0 >60 mL/min/1.73 m^2 Final     Comment:     Calculation used to obtain the estimated glomerular filtration  rate (eGFR) is the CKD-EPI equation.            EP PROCEDURE  1/2019  · Not inducible for any arrhythmia with up to DAES at baseline or with isuprel.  · Successful ablation of the cavotricuspid isthmus, for recorded AFL suspected of being typical (CTI-dependent).  · Dual AV arianna physiology, but no inducible AVNRT and no AV arianna echo beats.      Most recent EC-  Sinus rhythm, rate of 54 bpm, normal mean qrs axis.     Most recent Stress Test - N/A    Most recent Cath - N/A      Most recent Echo 2018  CONCLUSIONS     1 - Upper limit of normal left ventricular enlargement.     2 - Normal left ventricular systolic function (EF 55-60%).     3 - No wall motion abnormalities.     4 - Normal left ventricular diastolic function.     5 - Normal right ventricular systolic function .     6 - Moderate left atrial enlargement.     7 - Trivial mitral regurgitation.     8 - Trivial tricuspid regurgitation.     9 - The estimated PA systolic pressure is 17 mmHg.       24 hour Holter - 4/2019  Predominant Rhythm  Sinus rhythm with heart rates varying between 38 and 119 bpm with an average of 71 bpm.   Maximum heart rate recorded at: 07:34 on day 1.   Minimum heart rate recorded at 05:04 on day 1.        Summary:  Vel Ordonez is a 34 y.o. male with know SVT s/p slow pathway modification in 2018 as well as CTI line ablation in 2019 who was sent here for routine follow up after presenting to ED with atypical chest pain and ACS was ruled out. He appears to be doing well from arrhythmia stand point.  No  issues today besides hypertension.       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  He has been clinically well. Not on anticoagulation due to low CHADVAS 1 ( htn)  - supportive management     #Uncontrolled htn   Bp around 150s here and at home and not on any medications  - start chlorthalidone 25 mg daily  - patient advised to follow up with PCP     # LVH with repolarization changes on ECG  Suspect due to chronic htn      F/up in 1 year.

## 2020-07-30 ENCOUNTER — HOSPITAL ENCOUNTER (OUTPATIENT)
Dept: CARDIOLOGY | Facility: HOSPITAL | Age: 35
Discharge: HOME | End: 2020-07-30
Attending: INTERNAL MEDICINE
Payer: COMMERCIAL

## 2020-07-30 VITALS — WEIGHT: 292 LBS | BODY MASS INDEX: 35.56 KG/M2 | HEIGHT: 76 IN

## 2020-07-30 DIAGNOSIS — R07.9 CHEST PAIN, UNSPECIFIED TYPE: ICD-10-CM

## 2020-07-30 LAB
ASCENDING AORTA: 2.82 CM
BSA FOR ECHO PROCEDURE: 2.66 M2
CV ECHO LV RWT: 0.33 CM
CV STRESS BASE HR: 58 BPM
DIASTOLIC BLOOD PRESSURE: 75 MMHG
DOP CALC LVOT AREA: 4.1 CM2
DOP CALC LVOT DIAMETER: 2.29 CM
DOP CALC LVOT PEAK VEL: 0.9 M/S
DOP CALC LVOT STROKE VOLUME: 76.45 CM3
DOP CALCLVOT PEAK VEL VTI: 18.57 CM
E WAVE DECELERATION TIME: 216.42 MSEC
E/A RATIO: 1.03
E/E' RATIO: 7.88 M/S
ECHO LV POSTERIOR WALL: 0.89 CM (ref 0.6–1.1)
FRACTIONAL SHORTENING: 32 % (ref 28–44)
INTERVENTRICULAR SEPTUM: 0.87 CM (ref 0.6–1.1)
IVRT: 122.74 MSEC
LA MAJOR: 6.05 CM
LA MINOR: 6.1 CM
LA WIDTH: 4.64 CM
LEFT ATRIUM SIZE: 4.13 CM
LEFT ATRIUM VOLUME INDEX: 38 ML/M2
LEFT ATRIUM VOLUME: 98.95 CM3
LEFT INTERNAL DIMENSION IN SYSTOLE: 3.62 CM (ref 2.1–4)
LEFT VENTRICLE DIASTOLIC VOLUME INDEX: 52.41 ML/M2
LEFT VENTRICLE DIASTOLIC VOLUME: 136.38 ML
LEFT VENTRICLE MASS INDEX: 66 G/M2
LEFT VENTRICLE SYSTOLIC VOLUME INDEX: 21.2 ML/M2
LEFT VENTRICLE SYSTOLIC VOLUME: 55.27 ML
LEFT VENTRICULAR INTERNAL DIMENSION IN DIASTOLE: 5.32 CM (ref 3.5–6)
LEFT VENTRICULAR MASS: 170.6 G
LV LATERAL E/E' RATIO: 6.7 M/S
LV SEPTAL E/E' RATIO: 9.57 M/S
MV PEAK A VEL: 0.65 M/S
MV PEAK E VEL: 0.67 M/S
MV STENOSIS PRESSURE HALF TIME: 62.76 MS
MV VALVE AREA P 1/2 METHOD: 3.51 CM2
OHS CV CPX 1 MINUTE RECOVERY HEART RATE: 116 BPM
OHS CV CPX 85 PERCENT MAX PREDICTED HEART RATE MALE: 160
OHS CV CPX ESTIMATED METS: 14
OHS CV CPX MAX PREDICTED HEART RATE: 188
OHS CV CPX PATIENT IS FEMALE: 0
OHS CV CPX PATIENT IS MALE: 1
OHS CV CPX PEAK DIASTOLIC BLOOD PRESSURE: 89 MMHG
OHS CV CPX PEAK HEAR RATE: 164 BPM
OHS CV CPX PEAK RATE PRESSURE PRODUCT: NORMAL
OHS CV CPX PEAK SYSTOLIC BLOOD PRESSURE: 178 MMHG
OHS CV CPX PERCENT MAX PREDICTED HEART RATE ACHIEVED: 87
OHS CV CPX RATE PRESSURE PRODUCT PRESENTING: 8062
PISA TR MAX VEL: 2.3 M/S
PULM VEIN S/D RATIO: 0.95
PV PEAK D VEL: 0.38 M/S
PV PEAK S VEL: 0.36 M/S
RA MAJOR: 5.4 CM
RA PRESSURE: 3 MMHG
RA WIDTH: 3.8 CM
RIGHT VENTRICULAR END-DIASTOLIC DIMENSION: 4.14 CM
RV TISSUE DOPPLER FREE WALL SYSTOLIC VELOCITY 1 (APICAL 4 CHAMBER VIEW): 10.28 CM/S
SINUS: 3.07 CM
STJ: 2.52 CM
STRESS ECHO POST EXERCISE DUR MIN: 8 MINUTES
STRESS ECHO POST EXERCISE DUR SEC: 0 SECONDS
SYSTOLIC BLOOD PRESSURE: 139 MMHG
TDI LATERAL: 0.1 M/S
TDI SEPTAL: 0.07 M/S
TDI: 0.09 M/S
TR MAX PG: 21 MMHG
TRICUSPID ANNULAR PLANE SYSTOLIC EXCURSION: 1.84 CM
TV REST PULMONARY ARTERY PRESSURE: 24 MMHG

## 2020-07-30 PROCEDURE — 93351 STRESS TTE COMPLETE: CPT | Mod: 26,,, | Performed by: INTERNAL MEDICINE

## 2020-07-30 PROCEDURE — 93351 STRESS TTE COMPLETE: CPT

## 2020-07-30 PROCEDURE — 93351 STRESS ECHO (CUPID ONLY): ICD-10-PCS | Mod: 26,,, | Performed by: INTERNAL MEDICINE

## 2021-04-06 ENCOUNTER — IMMUNIZATION (OUTPATIENT)
Dept: OBSTETRICS AND GYNECOLOGY | Facility: CLINIC | Age: 36
End: 2021-04-06
Payer: MEDICAID

## 2021-04-06 DIAGNOSIS — Z23 NEED FOR VACCINATION: Primary | ICD-10-CM

## 2021-04-06 PROCEDURE — 91300 COVID-19, MRNA, LNP-S, PF, 30 MCG/0.3 ML DOSE VACCINE: CPT | Mod: PBBFAC

## 2021-04-16 ENCOUNTER — OFFICE VISIT (OUTPATIENT)
Dept: PSYCHIATRY | Facility: CLINIC | Age: 36
End: 2021-04-16
Payer: MEDICAID

## 2021-04-16 ENCOUNTER — LAB VISIT (OUTPATIENT)
Dept: LAB | Facility: HOSPITAL | Age: 36
End: 2021-04-16
Payer: MEDICAID

## 2021-04-16 VITALS
BODY MASS INDEX: 37.5 KG/M2 | SYSTOLIC BLOOD PRESSURE: 141 MMHG | WEIGHT: 308.06 LBS | DIASTOLIC BLOOD PRESSURE: 81 MMHG | HEART RATE: 84 BPM

## 2021-04-16 DIAGNOSIS — Z79.899 ENCOUNTER FOR LONG-TERM CURRENT USE OF MEDICATION: ICD-10-CM

## 2021-04-16 DIAGNOSIS — F41.9 ANXIETY DISORDER, UNSPECIFIED TYPE: Primary | ICD-10-CM

## 2021-04-16 DIAGNOSIS — F31.9 BIPOLAR 1 DISORDER: Chronic | ICD-10-CM

## 2021-04-16 LAB — VALPROATE SERPL-MCNC: 28.7 UG/ML (ref 50–100)

## 2021-04-16 PROCEDURE — 99213 OFFICE O/P EST LOW 20 MIN: CPT | Mod: S$PBB,SA,HB, | Performed by: NURSE PRACTITIONER

## 2021-04-16 PROCEDURE — 99999 PR PBB SHADOW E&M-EST. PATIENT-LVL III: ICD-10-PCS | Mod: PBBFAC,SA,HB, | Performed by: NURSE PRACTITIONER

## 2021-04-16 PROCEDURE — 99213 PR OFFICE/OUTPT VISIT, EST, LEVL III, 20-29 MIN: ICD-10-PCS | Mod: S$PBB,SA,HB, | Performed by: NURSE PRACTITIONER

## 2021-04-16 PROCEDURE — 36415 COLL VENOUS BLD VENIPUNCTURE: CPT | Performed by: NURSE PRACTITIONER

## 2021-04-16 PROCEDURE — 80164 ASSAY DIPROPYLACETIC ACD TOT: CPT | Performed by: NURSE PRACTITIONER

## 2021-04-16 PROCEDURE — 99213 OFFICE O/P EST LOW 20 MIN: CPT | Mod: PBBFAC | Performed by: NURSE PRACTITIONER

## 2021-04-16 PROCEDURE — 99999 PR PBB SHADOW E&M-EST. PATIENT-LVL III: CPT | Mod: PBBFAC,SA,HB, | Performed by: NURSE PRACTITIONER

## 2021-04-16 RX ORDER — HYDROXYZINE PAMOATE 25 MG/1
CAPSULE ORAL
Qty: 180 CAPSULE | Refills: 3 | Status: SHIPPED | OUTPATIENT
Start: 2021-04-16 | End: 2022-06-29

## 2021-04-16 RX ORDER — DIVALPROEX SODIUM 500 MG/1
500 TABLET, FILM COATED, EXTENDED RELEASE ORAL 3 TIMES DAILY
Qty: 270 TABLET | Refills: 3 | Status: SHIPPED | OUTPATIENT
Start: 2021-04-16 | End: 2022-06-29 | Stop reason: SDUPTHER

## 2021-04-27 ENCOUNTER — IMMUNIZATION (OUTPATIENT)
Dept: OBSTETRICS AND GYNECOLOGY | Facility: CLINIC | Age: 36
End: 2021-04-27
Payer: MEDICAID

## 2021-04-27 DIAGNOSIS — Z23 NEED FOR VACCINATION: Primary | ICD-10-CM

## 2021-04-27 PROCEDURE — 0002A COVID-19, MRNA, LNP-S, PF, 30 MCG/0.3 ML DOSE VACCINE: CPT | Mod: PBBFAC

## 2021-04-27 PROCEDURE — 91300 COVID-19, MRNA, LNP-S, PF, 30 MCG/0.3 ML DOSE VACCINE: CPT | Mod: PBBFAC

## 2021-09-13 ENCOUNTER — PATIENT MESSAGE (OUTPATIENT)
Dept: ELECTROPHYSIOLOGY | Facility: CLINIC | Age: 36
End: 2021-09-13

## 2021-09-14 DIAGNOSIS — I47.10 SVT (SUPRAVENTRICULAR TACHYCARDIA): Primary | ICD-10-CM

## 2021-09-15 ENCOUNTER — HOSPITAL ENCOUNTER (OUTPATIENT)
Dept: CARDIOLOGY | Facility: CLINIC | Age: 36
Discharge: HOME | End: 2021-09-15
Payer: MEDICAID

## 2021-09-15 ENCOUNTER — OFFICE VISIT (OUTPATIENT)
Dept: ELECTROPHYSIOLOGY | Facility: CLINIC | Age: 36
End: 2021-09-15
Payer: MEDICAID

## 2021-09-15 VITALS
DIASTOLIC BLOOD PRESSURE: 96 MMHG | WEIGHT: 291 LBS | HEIGHT: 76 IN | SYSTOLIC BLOOD PRESSURE: 128 MMHG | HEART RATE: 61 BPM | BODY MASS INDEX: 35.44 KG/M2

## 2021-09-15 DIAGNOSIS — I48.3 TYPICAL ATRIAL FLUTTER: ICD-10-CM

## 2021-09-15 DIAGNOSIS — Z86.79 S/P RF ABLATION OPERATION FOR ARRHYTHMIA: ICD-10-CM

## 2021-09-15 DIAGNOSIS — I47.10 SVT (SUPRAVENTRICULAR TACHYCARDIA): ICD-10-CM

## 2021-09-15 DIAGNOSIS — Z98.890 S/P RF ABLATION OPERATION FOR ARRHYTHMIA: ICD-10-CM

## 2021-09-15 DIAGNOSIS — I47.10 PAROXYSMAL SVT (SUPRAVENTRICULAR TACHYCARDIA): Primary | ICD-10-CM

## 2021-09-15 PROCEDURE — 93005 ELECTROCARDIOGRAM TRACING: CPT | Mod: PBBFAC | Performed by: INTERNAL MEDICINE

## 2021-09-15 PROCEDURE — 93010 ELECTROCARDIOGRAM REPORT: CPT | Mod: S$PBB,,, | Performed by: INTERNAL MEDICINE

## 2021-09-15 PROCEDURE — 99214 PR OFFICE/OUTPT VISIT, EST, LEVL IV, 30-39 MIN: ICD-10-PCS | Mod: S$PBB,,, | Performed by: INTERNAL MEDICINE

## 2021-09-15 PROCEDURE — 99213 OFFICE O/P EST LOW 20 MIN: CPT | Mod: PBBFAC | Performed by: INTERNAL MEDICINE

## 2021-09-15 PROCEDURE — 99999 PR PBB SHADOW E&M-EST. PATIENT-LVL III: ICD-10-PCS | Mod: PBBFAC,,, | Performed by: INTERNAL MEDICINE

## 2021-09-15 PROCEDURE — 99999 PR PBB SHADOW E&M-EST. PATIENT-LVL III: CPT | Mod: PBBFAC,,, | Performed by: INTERNAL MEDICINE

## 2021-09-15 PROCEDURE — 99214 OFFICE O/P EST MOD 30 MIN: CPT | Mod: S$PBB,,, | Performed by: INTERNAL MEDICINE

## 2021-09-15 PROCEDURE — 93010 RHYTHM STRIP: ICD-10-PCS | Mod: S$PBB,,, | Performed by: INTERNAL MEDICINE

## 2021-12-12 ENCOUNTER — HOSPITAL ENCOUNTER (EMERGENCY)
Facility: HOSPITAL | Age: 36
Discharge: HOME | End: 2021-12-12
Attending: EMERGENCY MEDICINE
Payer: MEDICAID

## 2021-12-12 VITALS
BODY MASS INDEX: 35.31 KG/M2 | OXYGEN SATURATION: 99 % | RESPIRATION RATE: 16 BRPM | WEIGHT: 290 LBS | DIASTOLIC BLOOD PRESSURE: 94 MMHG | TEMPERATURE: 98 F | HEART RATE: 81 BPM | SYSTOLIC BLOOD PRESSURE: 187 MMHG | HEIGHT: 76 IN

## 2021-12-12 DIAGNOSIS — J06.9 VIRAL URI WITH COUGH: Primary | ICD-10-CM

## 2021-12-12 DIAGNOSIS — R05.9 COUGH: ICD-10-CM

## 2021-12-12 LAB
CTP QC/QA: YES
CTP QC/QA: YES
POC MOLECULAR INFLUENZA A AGN: NEGATIVE
POC MOLECULAR INFLUENZA B AGN: NEGATIVE
SARS-COV-2 RDRP RESP QL NAA+PROBE: NEGATIVE

## 2021-12-12 PROCEDURE — 99284 EMERGENCY DEPT VISIT MOD MDM: CPT | Mod: CS,,, | Performed by: PHYSICIAN ASSISTANT

## 2021-12-12 PROCEDURE — U0002 COVID-19 LAB TEST NON-CDC: HCPCS | Performed by: EMERGENCY MEDICINE

## 2021-12-12 PROCEDURE — 99284 EMERGENCY DEPT VISIT MOD MDM: CPT | Mod: 25

## 2021-12-12 PROCEDURE — 99284 PR EMERGENCY DEPT VISIT,LEVEL IV: ICD-10-PCS | Mod: CS,,, | Performed by: PHYSICIAN ASSISTANT

## 2021-12-12 PROCEDURE — 87502 INFLUENZA DNA AMP PROBE: CPT

## 2021-12-12 RX ORDER — NAPROXEN 500 MG/1
500 TABLET ORAL 2 TIMES DAILY WITH MEALS
Qty: 20 TABLET | Refills: 0 | Status: SHIPPED | OUTPATIENT
Start: 2021-12-12

## 2021-12-12 RX ORDER — BENZONATATE 100 MG/1
100 CAPSULE ORAL 3 TIMES DAILY PRN
Qty: 20 CAPSULE | Refills: 0 | Status: SHIPPED | OUTPATIENT
Start: 2021-12-12 | End: 2021-12-22

## 2022-06-23 ENCOUNTER — PATIENT MESSAGE (OUTPATIENT)
Dept: PSYCHIATRY | Facility: CLINIC | Age: 37
End: 2022-06-23
Payer: MEDICAID

## 2022-06-29 ENCOUNTER — OFFICE VISIT (OUTPATIENT)
Dept: PSYCHIATRY | Facility: CLINIC | Age: 37
End: 2022-06-29
Payer: MEDICAID

## 2022-06-29 VITALS
SYSTOLIC BLOOD PRESSURE: 139 MMHG | HEIGHT: 76 IN | BODY MASS INDEX: 34.65 KG/M2 | DIASTOLIC BLOOD PRESSURE: 87 MMHG | WEIGHT: 284.5 LBS | HEART RATE: 73 BPM

## 2022-06-29 DIAGNOSIS — Z79.899 ENCOUNTER FOR LONG-TERM CURRENT USE OF MEDICATION: ICD-10-CM

## 2022-06-29 DIAGNOSIS — F41.9 ANXIETY DISORDER, UNSPECIFIED TYPE: ICD-10-CM

## 2022-06-29 DIAGNOSIS — F31.9 BIPOLAR 1 DISORDER: Primary | Chronic | ICD-10-CM

## 2022-06-29 PROCEDURE — 90833 PR PSYCHOTHERAPY W/PATIENT W/E&M, 30 MIN (ADD ON): ICD-10-PCS | Mod: SA,HB,, | Performed by: NURSE PRACTITIONER

## 2022-06-29 PROCEDURE — 3075F SYST BP GE 130 - 139MM HG: CPT | Mod: SA,HB,CPTII, | Performed by: NURSE PRACTITIONER

## 2022-06-29 PROCEDURE — 3075F PR MOST RECENT SYSTOLIC BLOOD PRESS GE 130-139MM HG: ICD-10-PCS | Mod: SA,HB,CPTII, | Performed by: NURSE PRACTITIONER

## 2022-06-29 PROCEDURE — 99213 OFFICE O/P EST LOW 20 MIN: CPT | Mod: PBBFAC | Performed by: NURSE PRACTITIONER

## 2022-06-29 PROCEDURE — 3079F PR MOST RECENT DIASTOLIC BLOOD PRESSURE 80-89 MM HG: ICD-10-PCS | Mod: SA,HB,CPTII, | Performed by: NURSE PRACTITIONER

## 2022-06-29 PROCEDURE — 1159F MED LIST DOCD IN RCRD: CPT | Mod: SA,HB,CPTII, | Performed by: NURSE PRACTITIONER

## 2022-06-29 PROCEDURE — 99999 PR PBB SHADOW E&M-EST. PATIENT-LVL III: ICD-10-PCS | Mod: PBBFAC,SA,HB, | Performed by: NURSE PRACTITIONER

## 2022-06-29 PROCEDURE — 1160F PR REVIEW ALL MEDS BY PRESCRIBER/CLIN PHARMACIST DOCUMENTED: ICD-10-PCS | Mod: SA,HB,CPTII, | Performed by: NURSE PRACTITIONER

## 2022-06-29 PROCEDURE — 99999 PR PBB SHADOW E&M-EST. PATIENT-LVL III: CPT | Mod: PBBFAC,SA,HB, | Performed by: NURSE PRACTITIONER

## 2022-06-29 PROCEDURE — 1159F PR MEDICATION LIST DOCUMENTED IN MEDICAL RECORD: ICD-10-PCS | Mod: SA,HB,CPTII, | Performed by: NURSE PRACTITIONER

## 2022-06-29 PROCEDURE — 1160F RVW MEDS BY RX/DR IN RCRD: CPT | Mod: SA,HB,CPTII, | Performed by: NURSE PRACTITIONER

## 2022-06-29 PROCEDURE — 3008F BODY MASS INDEX DOCD: CPT | Mod: SA,HB,CPTII, | Performed by: NURSE PRACTITIONER

## 2022-06-29 PROCEDURE — 3008F PR BODY MASS INDEX (BMI) DOCUMENTED: ICD-10-PCS | Mod: SA,HB,CPTII, | Performed by: NURSE PRACTITIONER

## 2022-06-29 PROCEDURE — 90833 PSYTX W PT W E/M 30 MIN: CPT | Mod: SA,HB,, | Performed by: NURSE PRACTITIONER

## 2022-06-29 PROCEDURE — 99213 OFFICE O/P EST LOW 20 MIN: CPT | Mod: S$PBB,SA,HB, | Performed by: NURSE PRACTITIONER

## 2022-06-29 PROCEDURE — 99213 PR OFFICE/OUTPT VISIT, EST, LEVL III, 20-29 MIN: ICD-10-PCS | Mod: S$PBB,SA,HB, | Performed by: NURSE PRACTITIONER

## 2022-06-29 PROCEDURE — 3079F DIAST BP 80-89 MM HG: CPT | Mod: SA,HB,CPTII, | Performed by: NURSE PRACTITIONER

## 2022-06-29 RX ORDER — FLUOXETINE HYDROCHLORIDE 20 MG/1
20 CAPSULE ORAL DAILY
Qty: 90 CAPSULE | Refills: 3 | Status: SHIPPED | OUTPATIENT
Start: 2022-06-29 | End: 2023-03-14 | Stop reason: SDUPTHER

## 2022-06-29 RX ORDER — DIVALPROEX SODIUM 500 MG/1
500 TABLET, FILM COATED, EXTENDED RELEASE ORAL 3 TIMES DAILY
Qty: 270 TABLET | Refills: 3 | Status: SHIPPED | OUTPATIENT
Start: 2022-06-29 | End: 2023-03-14 | Stop reason: SDUPTHER

## 2022-06-29 NOTE — PROGRESS NOTES
Outpatient Psychiatry Follow-Up Visit (MD/NP)    6/29/2022    Clinical Status of Patient:  Outpatient (Ambulatory)    Chief Complaint:  Vel Ordonez is a 36 y.o. male who presents today for follow-up of mood disorder.  Met with patient.      Last visit was: 4/16/21.  Chart and  reviewed.     Interval History and Content of Current Session:  Current Psychiatric Medications/changes  · Continue Depakote  mg po TID  · Trial of hydroxyzine 25 mg 1-2 caps po q hs prn anxiety or insomnia    Pt presents bright affect and euthymic mood. Stated that he didn't like the Vistaril.  significant other states that he appears to have decreased patience and poor frustration tolerance. Works as a .  Denies SI/HI/AVH.     Labs: most recent results Valproic Acid 28.7 subtherapeutic    Psychotherapy:  · Target symptoms: mood swings, mood disorder  · Why chosen therapy is appropriate versus another modality: relevant to diagnosis  · Outcome monitoring methods: self-report  · Therapeutic intervention type: insight oriented psychotherapy  · Topics discussed/themes: building skills sets for symptom management, symptom recognition  · The patient's response to the intervention is accepting. The patient's progress toward treatment goals is good.   · Duration of intervention: 17 minutes.    Review of Systems   · PSYCHIATRIC: Pertinant items are noted in the narrative.  · CONSTITUTIONAL: No weight gain or loss.   · MUSCULOSKELETAL: No pain or stiffness of the joints.  · NEUROLOGIC: No weakness, sensory changes, seizures, confusion, memory loss, tremor or other abnormal movements.  · ENDOCRINE: No polydipsia or polyuria.  · INTEGUMENTARY: No rashes or lacerations.  · EYES: No exophthalmos, jaundice or blindness.  · ENT: No dizziness, tinnitus or hearing loss.  · RESPIRATORY: No shortness of breath.  · CARDIOVASCULAR: No tachycardia or chest pain.  · GASTROINTESTINAL: No nausea, vomiting, pain, constipation or  "diarrhea.  · GENITOURINARY: No frequency, dysuria or sexual dysfunction.  · HEMATOLOGIC/LYMPHATIC: No excessive bleeding, prolonged or excessive bleeding after dental extraction/injury.  · ALLERGIC/IMMUNOLOGIC: No allergic response to materials, foods or animals at this time.    Past Medical, Family and Social History: The patient's past medical, family and social history have been reviewed and updated as appropriate within the electronic medical record - see encounter notes.    Compliance: yes    Side effects: None    Risk Parameters:  Patient reports no suicidal ideation  Patient reports no homicidal ideation  Patient reports no self-injurious behavior  Patient reports no violent behavior    Exam (detailed: at least 9 elements; comprehensive: all 15 elements)   Constitutional  Vitals:  Most recent vital signs, dated greater than 90 days prior to this appointment, were reviewed.   Vitals:    06/29/22 1032   BP: 139/87   Pulse: 73   Weight: 129.1 kg (284 lb 8.1 oz)   Height: 6' 4" (1.93 m)        General:  unremarkable, age appropriate     Musculoskeletal  Muscle Strength/Tone:  no tremor, no tic   Gait & Station:  non-ataxic     Psychiatric  Speech:  no latency; no press   Mood & Affect:  euthymic  congruent and appropriate   Thought Process:  normal and logical   Associations:  intact   Thought Content:  normal, no suicidality, no homicidality, delusions, or paranoia   Insight:  intact   Judgement: behavior is adequate to circumstances   Orientation:  grossly intact   Memory: intact for content of interview   Language: grossly intact   Attention Span & Concentration:  able to focus   Fund of Knowledge:  intact and appropriate to age and level of education     Assessment and Diagnosis   Status/Progress: Based on the examination today, the patient's problem(s) is/are adequately but not ideally controlled.  New problems have not been presented today.   Co-morbidities and Lack of compliance are not complicating " management of the primary condition.  There are no active rule-out diagnoses for this patient at this time.     General Impression:       ICD-10-CM ICD-9-CM   1. Bipolar 1 disorder  F31.9 296.7   2. Encounter for long-term current use of medication  Z79.899 V58.69   3. Anxiety disorder, unspecified type  F41.9 300.00       Intervention/Counseling/Treatment Plan   · Medication Management: Continue current medications. The risks and benefits of medication were discussed with the patient.  · Continue Depakote  mg po TID  · DC Vistaril   · Labs: ordered Valproic Acid level. Reviewed (28.7)  · Continue Depakote  mg 3 tabs daily  · Start Prozac 20 mg daily    Labs: Get Depakote level before next visit    Return to Clinic: 6 months     Risks, benefits, side effects and alternative treatments discussed with patient. Patient agrees with the current plan as documented.  Encouraged Patient to keep future appointments.  Take medications as prescribed and abstain from substance abuse.  Pt to present to ED for thoughts to harm himself or others

## 2022-07-03 PROBLEM — Z79.899 ENCOUNTER FOR LONG-TERM CURRENT USE OF MEDICATION: Status: ACTIVE | Noted: 2022-07-03

## 2022-07-03 PROBLEM — F41.9 ANXIETY DISORDER: Status: ACTIVE | Noted: 2022-07-03

## 2022-07-30 ENCOUNTER — LAB VISIT (OUTPATIENT)
Dept: LAB | Facility: HOSPITAL | Age: 37
End: 2022-07-30
Attending: NURSE PRACTITIONER
Payer: MEDICAID

## 2022-07-30 DIAGNOSIS — Z79.899 ENCOUNTER FOR LONG-TERM CURRENT USE OF MEDICATION: ICD-10-CM

## 2022-07-30 LAB — VALPROATE SERPL-MCNC: 87.7 UG/ML (ref 50–100)

## 2022-07-30 PROCEDURE — 80164 ASSAY DIPROPYLACETIC ACD TOT: CPT | Performed by: NURSE PRACTITIONER

## 2022-07-30 PROCEDURE — 36415 COLL VENOUS BLD VENIPUNCTURE: CPT | Mod: PO | Performed by: NURSE PRACTITIONER

## 2022-08-30 PROCEDURE — 96374 THER/PROPH/DIAG INJ IV PUSH: CPT

## 2022-08-30 PROCEDURE — 96375 TX/PRO/DX INJ NEW DRUG ADDON: CPT

## 2022-08-30 PROCEDURE — 96361 HYDRATE IV INFUSION ADD-ON: CPT

## 2022-08-30 PROCEDURE — 99284 PR EMERGENCY DEPT VISIT,LEVEL IV: ICD-10-PCS | Mod: CR,CS,, | Performed by: EMERGENCY MEDICINE

## 2022-08-30 PROCEDURE — 99284 EMERGENCY DEPT VISIT MOD MDM: CPT | Mod: 25

## 2022-08-30 PROCEDURE — 99284 EMERGENCY DEPT VISIT MOD MDM: CPT | Mod: CR,CS,, | Performed by: EMERGENCY MEDICINE

## 2022-08-31 ENCOUNTER — HOSPITAL ENCOUNTER (EMERGENCY)
Facility: HOSPITAL | Age: 37
Discharge: HOME | End: 2022-08-31
Attending: EMERGENCY MEDICINE
Payer: MEDICAID

## 2022-08-31 VITALS
HEART RATE: 68 BPM | BODY MASS INDEX: 33.49 KG/M2 | RESPIRATION RATE: 20 BRPM | WEIGHT: 275 LBS | SYSTOLIC BLOOD PRESSURE: 162 MMHG | HEIGHT: 76 IN | DIASTOLIC BLOOD PRESSURE: 97 MMHG | OXYGEN SATURATION: 100 % | TEMPERATURE: 98 F

## 2022-08-31 DIAGNOSIS — U07.1 COVID-19 VIRUS INFECTION: Primary | ICD-10-CM

## 2022-08-31 DIAGNOSIS — R51.9 NONINTRACTABLE HEADACHE, UNSPECIFIED CHRONICITY PATTERN, UNSPECIFIED HEADACHE TYPE: ICD-10-CM

## 2022-08-31 LAB
HCV AB SERPL QL IA: NORMAL
HIV 1+2 AB+HIV1 P24 AG SERPL QL IA: NORMAL
SARS-COV-2 RDRP RESP QL NAA+PROBE: POSITIVE

## 2022-08-31 PROCEDURE — 25000003 PHARM REV CODE 250: Performed by: EMERGENCY MEDICINE

## 2022-08-31 PROCEDURE — 86803 HEPATITIS C AB TEST: CPT | Performed by: PHYSICIAN ASSISTANT

## 2022-08-31 PROCEDURE — 63600175 PHARM REV CODE 636 W HCPCS: Performed by: EMERGENCY MEDICINE

## 2022-08-31 PROCEDURE — U0002 COVID-19 LAB TEST NON-CDC: HCPCS | Performed by: EMERGENCY MEDICINE

## 2022-08-31 PROCEDURE — 87389 HIV-1 AG W/HIV-1&-2 AB AG IA: CPT | Performed by: PHYSICIAN ASSISTANT

## 2022-08-31 RX ORDER — DROPERIDOL 2.5 MG/ML
1.25 INJECTION, SOLUTION INTRAMUSCULAR; INTRAVENOUS ONCE
Status: COMPLETED | OUTPATIENT
Start: 2022-08-31 | End: 2022-08-31

## 2022-08-31 RX ORDER — KETOROLAC TROMETHAMINE 30 MG/ML
10 INJECTION, SOLUTION INTRAMUSCULAR; INTRAVENOUS
Status: COMPLETED | OUTPATIENT
Start: 2022-08-31 | End: 2022-08-31

## 2022-08-31 RX ADMIN — KETOROLAC TROMETHAMINE 10 MG: 30 INJECTION, SOLUTION INTRAMUSCULAR; INTRAVENOUS at 01:08

## 2022-08-31 RX ADMIN — SODIUM CHLORIDE 1000 ML: 0.9 INJECTION, SOLUTION INTRAVENOUS at 01:08

## 2022-08-31 RX ADMIN — DROPERIDOL 1.25 MG: 2.5 INJECTION, SOLUTION INTRAMUSCULAR; INTRAVENOUS at 01:08

## 2022-08-31 NOTE — ED PROVIDER NOTES
Encounter Date: 8/30/2022       History     Chief Complaint   Patient presents with    Headache     Pt c/o HA x 3 days unrelieved by ibuprofen. Denies vision changes, head trauma, or falls.      Pleasant 36-year-old male presents the ER for evaluation approximately 2-3 days of headache. no fever chills chest pain shortness of breath, no weakness to 1 side of the body.  No new neurological symptoms.  Complaining of persistent headache but no improved with Tylenol Motrin.  Also complaining of some subjective myalgias and chills.  Concern for COVID as well.  Came to the ER for further evaluation.    Review of patient's allergies indicates:  No Known Allergies  Past Medical History:   Diagnosis Date    Anxiety disorder 7/3/2022    Atrial flutter     Bipolar 1 disorder 2011    Depression     Hx of psychiatric care     Hypertension     Psychiatric problem     Supraventricular tachycardia     Therapy      Past Surgical History:   Procedure Laterality Date    ABLATION N/A 9/17/2018    Procedure: ABLATION;  Surgeon: Demian Davis MD;  Location: [facility] CATH LAB;  Service: Cardiology;  Laterality: N/A;  SVT, RFA, CHELSIE, MAC, DM, 3 PREP     Family History   Problem Relation Age of Onset    Cancer Mother     Heart disease Father     Diabetes Father      Social History     Tobacco Use    Smoking status: Never    Smokeless tobacco: Never   Substance Use Topics    Alcohol use: No    Drug use: No     Review of Systems   Constitutional:  Positive for fatigue.   Neurological:  Positive for headaches.   All other systems reviewed and are negative.    Physical Exam     Initial Vitals [08/30/22 2136]   BP Pulse Resp Temp SpO2   (!) 183/107 63 18 98.3 °F (36.8 °C) 100 %      MAP       --         Physical Exam    Nursing note and vitals reviewed.  Constitutional: He appears well-developed and well-nourished.   HENT:   Head: Normocephalic and atraumatic.   Eyes: Pupils are equal, round, and reactive to light.   Neck:   Normal range of  motion.  Cardiovascular:  Normal rate, regular rhythm and normal heart sounds.           Pulmonary/Chest: Breath sounds normal. No respiratory distress.   Abdominal: Abdomen is soft. He exhibits no distension. There is no abdominal tenderness.   Musculoskeletal:         General: Normal range of motion.      Cervical back: Normal range of motion.     Neurological: He is alert and oriented to person, place, and time. He has normal strength. GCS score is 15. GCS eye subscore is 4. GCS verbal subscore is 5. GCS motor subscore is 6.   Skin: Skin is warm and dry. Capillary refill takes less than 2 seconds.   Psychiatric: He has a normal mood and affect. Thought content normal.       ED Course   Procedures  Labs Reviewed   SARS-COV-2 RNA AMPLIFICATION, QUAL - Abnormal; Notable for the following components:       Result Value    SARS-CoV-2 RNA, Amplification, Qual Positive (*)     All other components within normal limits   HIV 1 / 2 ANTIBODY    Narrative:     Release to patient->Immediate   HEPATITIS C ANTIBODY    Narrative:     Release to patient->Immediate   SARS-COV-2 RDRP GENE          Imaging Results    None          Medications   ketorolac injection 9.999 mg (9.999 mg Intravenous Given 8/31/22 0137)   droperidoL injection 1.25 mg (1.25 mg Intravenous Given 8/31/22 0136)   sodium chloride 0.9% bolus 1,000 mL (0 mLs Intravenous Stopped 8/31/22 0315)     Medical Decision Making:   Initial Assessment:   36-year-old male presents the ER for evaluation of low risk headache.  No concerning features, logically intact.  Will plan symptomatic support will check for COVID reassess hopefully discharge the improvement in symptoms.             ED Course as of 08/31/22 0358   Wed Aug 31, 2022   0259 Resting in bed, no acute distress. Patient is COVID positive.  Informed him diagnosis.  Will enroll in COVID monitoring program.  Return precautions discussed patient will be discharged. [SE]      ED Course User Index  [SE] Esvin COOL  MD Lukas             Clinical Impression:   Final diagnoses:  [U07.1] COVID-19 virus infection (Primary)  [R51.9] Nonintractable headache, unspecified chronicity pattern, unspecified headache type      ED Disposition Condition    Discharge Stable          ED Prescriptions    None       Follow-up Information    None          Esvin Gaytan MD  08/31/22 1054

## 2022-08-31 NOTE — Clinical Note
"Velkeven Ordonez ([place]) was seen and treated in our emergency department on 8/30/2022.     COVID-19 is present in our communities across the state. There is limited testing for COVID at this time, so not all patients can be tested. In this situation, your employee meets the following criteria:    Vel Ordonez has met the criteria for COVID-19 testing and has a POSITIVE result. He can return to work once they are asymptomatic for 24 hours without the use of fever reducing medications AND at least five days from the first positive result. A mask is recommended for 5 days post quarantine.     If you have any questions or concerns, or if I can be of further assistance, please do not hesitate to contact me.    Sincerely,             Esvin Gaytan MD"

## 2022-08-31 NOTE — DISCHARGE INSTRUCTIONS
Your test was POSITIVE for COVID-19 (coronavirus).       Please isolate yourself at home.  You may leave home and/or return to work once the following conditions are met:    If you were not hospitalized and are not moderately to severely immunocompromised:   More than 5 days since symptoms first appeared AND  More than 24 hours fever free without medications AND  Symptoms are improving  Continue to wear a mask around others for 5 additional days.    If you were hospitalized OR are moderately to severely immunocompromised:  More than 20 days since symptoms first appeared  More than 24 hours fever free without medications  Symptoms have improved    If you had no symptoms but tested positive:  More than 5 days since the date of the first positive test (20 days if moderately to severely immunocompromised). If you develop symptoms, then use the guidelines above.  Continue to wear a mask around others for 5 additional days.      Contact Tracing    As one of the next steps, you will receive a call or text from the [place] Department of Health ([facility]) COVID-19 Tracing Team. See the contact information below so you know not to ignore the health departments call. It is important that you contact them back immediately so they can help.      Contact Tracer Number:  [phone]  Caller ID for most carriers: [facility]     What is contact tracing?  Contact tracing is a process that helps identify everyone who has been in close contact with an infected person. Contact tracers let those people know they may have been exposed and guide them on next steps. Confidentiality is important for everyone; no one will be told who may have exposed them to the virus.  Your involvement is important. The more we know about where and how this virus is spreading, the better chance we have at stopping it from spreading further.  What does exposure mean?  Exposure means you have been within 6 feet for more than 15 minutes with a person who  has or had COVID-19.  What kind of questions do the contact tracers ask?  A contact tracer will confirm your basic contact information including name, address, phone number, and next of kin, as well as asking about any symptoms you may have had. Theyll also ask you how you think you may have gotten sick, such as places where you may have been exposed to the virus, and people you were with. Those names will never be shared with anyone outside of that call, and will only be used to help trace and stop the spread of the virus.   I have privacy concerns. How will the state use my information?  Your privacy about your health is important. All calls are completed using call centers that use the appropriate health privacy protection measures (HIPAA compliance), meaning that your patient information is safe. No one will ever ask you any questions related to immigration status. Your health comes first.   Do I have to participate?  You do not have to participate, but we strongly encourage you to. Contact tracing can help us catch and control new outbreaks as theyre developing to keep your friends and family safe.   What if I dont hear from anyone?  If you dont receive a call within 24 hours, you can call the number above right away to inquire about your status. That line is open from 8:00 am - 8:00 p.m., 7 days a week.  Contact tracing saves lives! Together, we have the power to beat this virus and keep our loved ones and neighbors safe.    For more information see CDC link below.      https://www.cdc.gov/coronavirus/2019-ncov/hcp/guidance-prevent-spread.html#precautions        Sources:  [place] [facility], [place] [facility] and [facility]           Sincerely,     Esvin Gaytan MD

## 2022-08-31 NOTE — ED TRIAGE NOTES
Vel Ordonez, an 36 y.o. male presents to the ED with c/o  dizziness, headache for 2 days and cough. Pt rates his headache 10/10 and throbbing. Pt states he is been taking ibuprofen and tylenol and has no help.      Review of patient's allergies indicates:  No Known Allergies  Chief Complaint   Patient presents with    Headache     Pt c/o HA x 3 days unrelieved by ibuprofen. Denies vision changes, head trauma, or falls.      Past Medical History:   Diagnosis Date    Anxiety disorder 7/3/2022    Atrial flutter     Bipolar 1 disorder 2011    Depression     Hx of psychiatric care     Hypertension     Psychiatric problem     Supraventricular tachycardia     Therapy

## 2023-01-21 ENCOUNTER — OFFICE VISIT (OUTPATIENT)
Dept: URGENT CARE | Facility: CLINIC | Age: 38
End: 2023-01-21
Payer: MEDICAID

## 2023-01-21 VITALS
WEIGHT: 275 LBS | SYSTOLIC BLOOD PRESSURE: 148 MMHG | HEIGHT: 76 IN | OXYGEN SATURATION: 96 % | TEMPERATURE: 98 F | HEART RATE: 82 BPM | BODY MASS INDEX: 33.49 KG/M2 | DIASTOLIC BLOOD PRESSURE: 97 MMHG

## 2023-01-21 DIAGNOSIS — J10.1 INFLUENZA B: Primary | ICD-10-CM

## 2023-01-21 LAB
CTP QC/QA: YES
CTP QC/QA: YES
POC MOLECULAR INFLUENZA A AGN: NEGATIVE
POC MOLECULAR INFLUENZA B AGN: POSITIVE
SARS-COV-2 AG RESP QL IA.RAPID: NEGATIVE

## 2023-01-21 PROCEDURE — 3080F PR MOST RECENT DIASTOLIC BLOOD PRESSURE >= 90 MM HG: ICD-10-PCS | Mod: CPTII,S$GLB,, | Performed by: NURSE PRACTITIONER

## 2023-01-21 PROCEDURE — 87502 POCT INFLUENZA A/B MOLECULAR: ICD-10-PCS | Mod: QW,S$GLB,, | Performed by: NURSE PRACTITIONER

## 2023-01-21 PROCEDURE — 99203 PR OFFICE/OUTPT VISIT, NEW, LEVL III, 30-44 MIN: ICD-10-PCS | Mod: S$GLB,,, | Performed by: NURSE PRACTITIONER

## 2023-01-21 PROCEDURE — 3077F PR MOST RECENT SYSTOLIC BLOOD PRESSURE >= 140 MM HG: ICD-10-PCS | Mod: CPTII,S$GLB,, | Performed by: NURSE PRACTITIONER

## 2023-01-21 PROCEDURE — 3008F BODY MASS INDEX DOCD: CPT | Mod: CPTII,S$GLB,, | Performed by: NURSE PRACTITIONER

## 2023-01-21 PROCEDURE — 1160F PR REVIEW ALL MEDS BY PRESCRIBER/CLIN PHARMACIST DOCUMENTED: ICD-10-PCS | Mod: CPTII,S$GLB,, | Performed by: NURSE PRACTITIONER

## 2023-01-21 PROCEDURE — 87811 SARS-COV-2 COVID19 W/OPTIC: CPT | Mod: QW,S$GLB,, | Performed by: NURSE PRACTITIONER

## 2023-01-21 PROCEDURE — 3008F PR BODY MASS INDEX (BMI) DOCUMENTED: ICD-10-PCS | Mod: CPTII,S$GLB,, | Performed by: NURSE PRACTITIONER

## 2023-01-21 PROCEDURE — 99203 OFFICE O/P NEW LOW 30 MIN: CPT | Mod: S$GLB,,, | Performed by: NURSE PRACTITIONER

## 2023-01-21 PROCEDURE — 87502 INFLUENZA DNA AMP PROBE: CPT | Mod: QW,S$GLB,, | Performed by: NURSE PRACTITIONER

## 2023-01-21 PROCEDURE — 3077F SYST BP >= 140 MM HG: CPT | Mod: CPTII,S$GLB,, | Performed by: NURSE PRACTITIONER

## 2023-01-21 PROCEDURE — 1160F RVW MEDS BY RX/DR IN RCRD: CPT | Mod: CPTII,S$GLB,, | Performed by: NURSE PRACTITIONER

## 2023-01-21 PROCEDURE — 1159F MED LIST DOCD IN RCRD: CPT | Mod: CPTII,S$GLB,, | Performed by: NURSE PRACTITIONER

## 2023-01-21 PROCEDURE — 1159F PR MEDICATION LIST DOCUMENTED IN MEDICAL RECORD: ICD-10-PCS | Mod: CPTII,S$GLB,, | Performed by: NURSE PRACTITIONER

## 2023-01-21 PROCEDURE — 3080F DIAST BP >= 90 MM HG: CPT | Mod: CPTII,S$GLB,, | Performed by: NURSE PRACTITIONER

## 2023-01-21 PROCEDURE — 87811 SARS CORONAVIRUS 2 ANTIGEN POCT, MANUAL READ: ICD-10-PCS | Mod: QW,S$GLB,, | Performed by: NURSE PRACTITIONER

## 2023-01-21 RX ORDER — AZELASTINE 1 MG/ML
1 SPRAY, METERED NASAL 2 TIMES DAILY
Qty: 30 ML | Refills: 0 | Status: SHIPPED | OUTPATIENT
Start: 2023-01-21 | End: 2023-01-21

## 2023-01-21 RX ORDER — OSELTAMIVIR PHOSPHATE 75 MG/1
75 CAPSULE ORAL 2 TIMES DAILY
Qty: 10 CAPSULE | Refills: 0 | Status: SHIPPED | OUTPATIENT
Start: 2023-01-21 | End: 2023-01-21

## 2023-01-21 RX ORDER — FLUTICASONE PROPIONATE 50 MCG
1 SPRAY, SUSPENSION (ML) NASAL 2 TIMES DAILY
Qty: 9.9 ML | Refills: 0 | Status: SHIPPED | OUTPATIENT
Start: 2023-01-21 | End: 2023-01-21

## 2023-01-21 RX ORDER — OSELTAMIVIR PHOSPHATE 75 MG/1
75 CAPSULE ORAL 2 TIMES DAILY
Qty: 10 CAPSULE | Refills: 0 | Status: SHIPPED | OUTPATIENT
Start: 2023-01-21

## 2023-01-21 RX ORDER — AZELASTINE 1 MG/ML
1 SPRAY, METERED NASAL 2 TIMES DAILY
Qty: 30 ML | Refills: 0 | Status: SHIPPED | OUTPATIENT
Start: 2023-01-21 | End: 2023-01-31

## 2023-01-21 RX ORDER — FLUTICASONE PROPIONATE 50 MCG
1 SPRAY, SUSPENSION (ML) NASAL 2 TIMES DAILY
Qty: 9.9 ML | Refills: 0 | Status: SHIPPED | OUTPATIENT
Start: 2023-01-21

## 2023-01-21 NOTE — LETTER
January 21, 2023      [facility]  [address], [address]  [place] [address]  Phone: [phone]  Fax: [phone]       Patient: Vel Ordonez   YOB: 1985  Date of Visit: 01/21/2023    To Whom It May Concern:    [place] Ordonez  was at [facility] on 01/21/2023. The patient may return to work/school on 1/25/2023. If you have any questions or concerns, or if I can be of further assistance, please do not hesitate to contact me.    Sincerely,    Oscar Hanna NP

## 2023-01-21 NOTE — PATIENT INSTRUCTIONS
- Follow up with your PCP or specialty clinic as directed in the next 1-2 weeks if not improved or as needed.  You can call [phone] to schedule an appointment with the appropriate provider.    - Go to the ER or seek medical attention immediately if you develop new or worsening symptoms.    - You must understand that you have received an Urgent Care treatment only and that you may be released before all of your medical problems are known or treated.   - You, the patient, will arrange for follow up care as instructed.   - If your condition worsens or fails to improve we recommend that you receive another evaluation at the ER immediately or contact your PCP to discuss your concerns or return here.        - Tylenol or Ibuprofen as directed as needed for fever/pain. Avoid tylenol if you have a history of liver disease. Do not take ibuprofen if you have a history of GI bleeding, kidney disease, or if you take blood thinners.   - Take ibuprofen 600-800 mg every 6-8 hours for pain and inflammation.  You can also take Tylenol/acetaminophen 650-1000 mg every 6-8 hours for added pain relief.     - You can take over-the-counter claritin, zyrtec, allegra, or xyzal as directed. These are antihistamines that can help with runny nose, nasal congestion, sneezing, and helps to dry up post-nasal drip, which usually causes sore throat and cough.              - If you do NOT have high blood pressure, you may use a decongestant form (D)  of this medication or if you do not take the D form, you can take sudafed (pseudoephedrine) over the counter, which is a decongestant.     - You can use Flonase (fluticasone) nasal spray as directed for sinus congestion and postnasal drip. This is a steroid nasal spray that works locally over time to decrease the inflammation in your nose/sinuses and help with allergic symptoms. This is not an quick- relief spray like afrin, but it works well if used daily.  Discontinue if you develop nose  bleed  - use nasal saline prior to Flonase.     - Use Ocean Spray Nasal Saline 1-3 puffs each nostril every 2-3 hours then blow out onto tissue. This is to irrigate the nasal passage way to clear the sinus openings. Use until sinus problem resolved.     - you can take plain Mucinex (guaifenesin) 1200 mg twice a day to help loosen mucous     -warm salt water gargles can help with sore throat     - warm tea with honey can help with cough. Honey is a natural cough suppressant.     - Follow up with your PCP or specialty clinic as directed in the next 1-2 weeks if not improved or as needed.  You can call [phone] to schedule an appointment with the appropriate provider.       - Go to the ER if you develop new or worsening symptoms.

## 2023-01-21 NOTE — PROGRESS NOTES
"Subjective:       Patient ID: Vel Ordonez is a 37 y.o. male.    Vitals:  height is 6' 4" (1.93 m) and weight is 124.7 kg (275 lb). His oral temperature is 98.2 °F (36.8 °C). His blood pressure is 148/97 (abnormal) and his pulse is 82. His oxygen saturation is 96%.     Chief Complaint: Headache    37-year-old male presents to clinic for evaluation of headache, cough, body aches, fatigue x3 days.  Patient is vaccinated for COVID, denies any recent sick contacts.  He reports taking over-the-counter cold and flu medications along with Tylenol.  He is awake and alert, answers questions appropriately, no acute distress noted today's visit.         Headache   This is a new problem. The current episode started yesterday. The problem occurs constantly. The problem has been unchanged. The pain is located in the Frontal region. Radiates to: body. The quality of the pain is described as aching. The pain is at a severity of 5/10. The pain is mild. Associated symptoms include coughing and muscle aches. Pertinent negatives include no abdominal pain, dizziness, fever, nausea or vomiting. Nothing aggravates the symptoms. He has tried acetaminophen for the symptoms. The treatment provided mild relief.     Constitution: Positive for chills and fatigue. Negative for activity change, appetite change and fever.   HENT:  Positive for congestion.    Cardiovascular:  Negative for chest pain.   Respiratory:  Positive for cough. Negative for shortness of breath.    Gastrointestinal:  Negative for abdominal pain, nausea and vomiting.   Neurological:  Positive for headaches. Negative for dizziness.     Objective:      Physical Exam   Constitutional: He is oriented to person, place, and time. He appears well-developed.  Non-toxic appearance. He does not appear ill. No distress.   HENT:   Head: Normocephalic and atraumatic. Head is without abrasion, without contusion and without laceration.   Ears:   Right Ear: External ear normal. "   Left Ear: External ear normal.   Nose: Congestion present.   Mouth/Throat: Mucous membranes are normal. Posterior oropharyngeal erythema present.   Eyes: Conjunctivae, EOM and lids are normal. Right eye exhibits no discharge. Left eye exhibits no discharge.   Neck: Trachea normal and phonation normal.   Cardiovascular: Normal rate, regular rhythm and normal heart sounds.   Pulmonary/Chest: Effort normal and breath sounds normal. No stridor. No respiratory distress. He has no wheezes.   Abdominal: Normal appearance.   Musculoskeletal: Normal range of motion.         General: Normal range of motion.   Neurological: He is alert and oriented to person, place, and time.   Skin: Skin is warm, dry, intact, not diaphoretic and not pale. No abrasion, No burn and No ecchymosis   Psychiatric: His speech is normal and behavior is normal. Mood, judgment and thought content normal.   Nursing note and vitals reviewed.      Results for orders placed or performed in visit on 01/21/23   SARS Coronavirus 2 Antigen, POCT Manual Read   Result Value Ref Range    SARS Coronavirus 2 Antigen Negative Negative     Acceptable Yes    POCT Influenza A/B MOLECULAR   Result Value Ref Range    POC Molecular Influenza A Ag Negative Negative, Not Reported    POC Molecular Influenza B Ag Positive (A) Negative, Not Reported     Acceptable Yes        Assessment:       1. Influenza B          Plan:         Influenza B  -     SARS Coronavirus 2 Antigen, POCT Manual Read  -     POCT Influenza A/B MOLECULAR  -     fluticasone propionate (FLONASE) 50 mcg/actuation nasal spray; 1 spray (50 mcg total) by Each Nostril route 2 (two) times daily.  Dispense: 9.9 mL; Refill: 0  -     azelastine (ASTELIN) 137 mcg (0.1 %) nasal spray; 1 spray (137 mcg total) by Nasal route 2 (two) times daily. for 10 days  Dispense: 30 mL; Refill: 0  -     dextromethorphan 15 mg/5 mL syrup; Take 5 mLs (15 mg total) by mouth every 4 to 6 hours as  needed for Cough.  Dispense: 118 mL; Refill: 0  -     oseltamivir (TAMIFLU) 75 MG capsule; Take 1 capsule (75 mg total) by mouth 2 (two) times daily.  Dispense: 10 capsule; Refill: 0      Patient Instructions   - Follow up with your PCP or specialty clinic as directed in the next 1-2 weeks if not improved or as needed.  You can call [phone] to schedule an appointment with the appropriate provider.    - Go to the ER or seek medical attention immediately if you develop new or worsening symptoms.    - You must understand that you have received an Urgent Care treatment only and that you may be released before all of your medical problems are known or treated.   - You, the patient, will arrange for follow up care as instructed.   - If your condition worsens or fails to improve we recommend that you receive another evaluation at the ER immediately or contact your PCP to discuss your concerns or return here.        - Tylenol or Ibuprofen as directed as needed for fever/pain. Avoid tylenol if you have a history of liver disease. Do not take ibuprofen if you have a history of GI bleeding, kidney disease, or if you take blood thinners.   - Take ibuprofen 600-800 mg every 6-8 hours for pain and inflammation.  You can also take Tylenol/acetaminophen 650-1000 mg every 6-8 hours for added pain relief.     - You can take over-the-counter claritin, zyrtec, allegra, or xyzal as directed. These are antihistamines that can help with runny nose, nasal congestion, sneezing, and helps to dry up post-nasal drip, which usually causes sore throat and cough.              - If you do NOT have high blood pressure, you may use a decongestant form (D)  of this medication or if you do not take the D form, you can take sudafed (pseudoephedrine) over the counter, which is a decongestant.     - You can use Flonase (fluticasone) nasal spray as directed for sinus congestion and postnasal drip. This is a steroid nasal spray that works locally  over time to decrease the inflammation in your nose/sinuses and help with allergic symptoms. This is not an quick- relief spray like afrin, but it works well if used daily.  Discontinue if you develop nose bleed  - use nasal saline prior to Flonase.     - Use Ocean Spray Nasal Saline 1-3 puffs each nostril every 2-3 hours then blow out onto tissue. This is to irrigate the nasal passage way to clear the sinus openings. Use until sinus problem resolved.     - you can take plain Mucinex (guaifenesin) 1200 mg twice a day to help loosen mucous     -warm salt water gargles can help with sore throat     - warm tea with honey can help with cough. Honey is a natural cough suppressant.     - Follow up with your PCP or specialty clinic as directed in the next 1-2 weeks if not improved or as needed.  You can call [phone] to schedule an appointment with the appropriate provider.       - Go to the ER if you develop new or worsening symptoms.

## 2023-03-14 ENCOUNTER — LAB VISIT (OUTPATIENT)
Dept: LAB | Facility: HOSPITAL | Age: 38
End: 2023-03-14
Payer: MEDICAID

## 2023-03-14 ENCOUNTER — OFFICE VISIT (OUTPATIENT)
Dept: PSYCHIATRY | Facility: CLINIC | Age: 38
End: 2023-03-14
Payer: MEDICAID

## 2023-03-14 VITALS
SYSTOLIC BLOOD PRESSURE: 148 MMHG | BODY MASS INDEX: 35.74 KG/M2 | DIASTOLIC BLOOD PRESSURE: 73 MMHG | WEIGHT: 293.63 LBS | HEART RATE: 95 BPM

## 2023-03-14 DIAGNOSIS — Z79.899 ENCOUNTER FOR LONG-TERM CURRENT USE OF MEDICATION: ICD-10-CM

## 2023-03-14 DIAGNOSIS — F31.9 BIPOLAR 1 DISORDER: Chronic | ICD-10-CM

## 2023-03-14 DIAGNOSIS — F31.9 BIPOLAR 1 DISORDER: Primary | Chronic | ICD-10-CM

## 2023-03-14 DIAGNOSIS — F41.9 ANXIETY DISORDER, UNSPECIFIED TYPE: ICD-10-CM

## 2023-03-14 LAB
ALBUMIN SERPL BCP-MCNC: 3.9 G/DL (ref 3.5–5.2)
ALP SERPL-CCNC: 78 U/L (ref 55–135)
ALT SERPL W/O P-5'-P-CCNC: 27 U/L (ref 10–44)
ANION GAP SERPL CALC-SCNC: 8 MMOL/L (ref 8–16)
AST SERPL-CCNC: 20 U/L (ref 10–40)
BASOPHILS # BLD AUTO: 0.07 K/UL (ref 0–0.2)
BASOPHILS NFR BLD: 0.6 % (ref 0–1.9)
BILIRUB SERPL-MCNC: 0.2 MG/DL (ref 0.1–1)
BUN SERPL-MCNC: 17 MG/DL (ref 6–20)
CALCIUM SERPL-MCNC: 9.8 MG/DL (ref 8.7–10.5)
CHLORIDE SERPL-SCNC: 104 MMOL/L (ref 95–110)
CO2 SERPL-SCNC: 30 MMOL/L (ref 23–29)
CREAT SERPL-MCNC: 0.9 MG/DL (ref 0.5–1.4)
DIFFERENTIAL METHOD: ABNORMAL
EOSINOPHIL # BLD AUTO: 0.1 K/UL (ref 0–0.5)
EOSINOPHIL NFR BLD: 0.8 % (ref 0–8)
ERYTHROCYTE [DISTWIDTH] IN BLOOD BY AUTOMATED COUNT: 12.9 % (ref 11.5–14.5)
EST. GFR  (NO RACE VARIABLE): >60 ML/MIN/1.73 M^2
GLUCOSE SERPL-MCNC: 95 MG/DL (ref 70–110)
HCT VFR BLD AUTO: 46.5 % (ref 40–54)
HGB BLD-MCNC: 15 G/DL (ref 14–18)
IMM GRANULOCYTES # BLD AUTO: 0.07 K/UL (ref 0–0.04)
IMM GRANULOCYTES NFR BLD AUTO: 0.6 % (ref 0–0.5)
LYMPHOCYTES # BLD AUTO: 2.9 K/UL (ref 1–4.8)
LYMPHOCYTES NFR BLD: 23.4 % (ref 18–48)
MCH RBC QN AUTO: 30.1 PG (ref 27–31)
MCHC RBC AUTO-ENTMCNC: 32.3 G/DL (ref 32–36)
MCV RBC AUTO: 93 FL (ref 82–98)
MONOCYTES # BLD AUTO: 1.4 K/UL (ref 0.3–1)
MONOCYTES NFR BLD: 11.5 % (ref 4–15)
NEUTROPHILS # BLD AUTO: 7.7 K/UL (ref 1.8–7.7)
NEUTROPHILS NFR BLD: 63.1 % (ref 38–73)
NRBC BLD-RTO: 0 /100 WBC
PLATELET # BLD AUTO: 324 K/UL (ref 150–450)
PMV BLD AUTO: 10.5 FL (ref 9.2–12.9)
POTASSIUM SERPL-SCNC: 4.3 MMOL/L (ref 3.5–5.1)
PROT SERPL-MCNC: 7.5 G/DL (ref 6–8.4)
RBC # BLD AUTO: 4.98 M/UL (ref 4.6–6.2)
SODIUM SERPL-SCNC: 142 MMOL/L (ref 136–145)
TSH SERPL DL<=0.005 MIU/L-ACNC: 3.48 UIU/ML (ref 0.4–4)
VALPROATE SERPL-MCNC: 69.7 UG/ML (ref 50–100)
WBC # BLD AUTO: 12.21 K/UL (ref 3.9–12.7)

## 2023-03-14 PROCEDURE — 1160F RVW MEDS BY RX/DR IN RCRD: CPT | Mod: SA,HB,CPTII, | Performed by: NURSE PRACTITIONER

## 2023-03-14 PROCEDURE — 99214 OFFICE O/P EST MOD 30 MIN: CPT | Mod: S$PBB,SA,HB, | Performed by: NURSE PRACTITIONER

## 2023-03-14 PROCEDURE — 99213 OFFICE O/P EST LOW 20 MIN: CPT | Mod: PBBFAC | Performed by: NURSE PRACTITIONER

## 2023-03-14 PROCEDURE — 1159F MED LIST DOCD IN RCRD: CPT | Mod: SA,HB,CPTII, | Performed by: NURSE PRACTITIONER

## 2023-03-14 PROCEDURE — 1159F PR MEDICATION LIST DOCUMENTED IN MEDICAL RECORD: ICD-10-PCS | Mod: SA,HB,CPTII, | Performed by: NURSE PRACTITIONER

## 2023-03-14 PROCEDURE — 3077F PR MOST RECENT SYSTOLIC BLOOD PRESSURE >= 140 MM HG: ICD-10-PCS | Mod: SA,HB,CPTII, | Performed by: NURSE PRACTITIONER

## 2023-03-14 PROCEDURE — 99214 PR OFFICE/OUTPT VISIT, EST, LEVL IV, 30-39 MIN: ICD-10-PCS | Mod: S$PBB,SA,HB, | Performed by: NURSE PRACTITIONER

## 2023-03-14 PROCEDURE — 85025 COMPLETE CBC W/AUTO DIFF WBC: CPT | Performed by: NURSE PRACTITIONER

## 2023-03-14 PROCEDURE — 3077F SYST BP >= 140 MM HG: CPT | Mod: SA,HB,CPTII, | Performed by: NURSE PRACTITIONER

## 2023-03-14 PROCEDURE — 3008F PR BODY MASS INDEX (BMI) DOCUMENTED: ICD-10-PCS | Mod: SA,HB,CPTII, | Performed by: NURSE PRACTITIONER

## 2023-03-14 PROCEDURE — 80053 COMPREHEN METABOLIC PANEL: CPT | Performed by: NURSE PRACTITIONER

## 2023-03-14 PROCEDURE — 36415 COLL VENOUS BLD VENIPUNCTURE: CPT | Performed by: NURSE PRACTITIONER

## 2023-03-14 PROCEDURE — 3008F BODY MASS INDEX DOCD: CPT | Mod: SA,HB,CPTII, | Performed by: NURSE PRACTITIONER

## 2023-03-14 PROCEDURE — 1160F PR REVIEW ALL MEDS BY PRESCRIBER/CLIN PHARMACIST DOCUMENTED: ICD-10-PCS | Mod: SA,HB,CPTII, | Performed by: NURSE PRACTITIONER

## 2023-03-14 PROCEDURE — 99999 PR PBB SHADOW E&M-EST. PATIENT-LVL III: CPT | Mod: PBBFAC,SA,HB, | Performed by: NURSE PRACTITIONER

## 2023-03-14 PROCEDURE — 3078F PR MOST RECENT DIASTOLIC BLOOD PRESSURE < 80 MM HG: ICD-10-PCS | Mod: SA,HB,CPTII, | Performed by: NURSE PRACTITIONER

## 2023-03-14 PROCEDURE — 3078F DIAST BP <80 MM HG: CPT | Mod: SA,HB,CPTII, | Performed by: NURSE PRACTITIONER

## 2023-03-14 PROCEDURE — 80164 ASSAY DIPROPYLACETIC ACD TOT: CPT | Performed by: NURSE PRACTITIONER

## 2023-03-14 PROCEDURE — 84443 ASSAY THYROID STIM HORMONE: CPT | Performed by: NURSE PRACTITIONER

## 2023-03-14 PROCEDURE — 99999 PR PBB SHADOW E&M-EST. PATIENT-LVL III: ICD-10-PCS | Mod: PBBFAC,SA,HB, | Performed by: NURSE PRACTITIONER

## 2023-03-14 RX ORDER — FLUOXETINE HYDROCHLORIDE 20 MG/1
20 CAPSULE ORAL DAILY
Qty: 90 CAPSULE | Refills: 3 | Status: SHIPPED | OUTPATIENT
Start: 2023-03-14 | End: 2023-07-28 | Stop reason: SDUPTHER

## 2023-03-14 RX ORDER — DIVALPROEX SODIUM 500 MG/1
500 TABLET, FILM COATED, EXTENDED RELEASE ORAL 3 TIMES DAILY
Qty: 270 TABLET | Refills: 3 | Status: SHIPPED | OUTPATIENT
Start: 2023-03-14 | End: 2023-07-28 | Stop reason: SDUPTHER

## 2023-03-14 NOTE — PROGRESS NOTES
Outpatient Psychiatry Follow-Up Visit (MD/NP)    3/14/2023    Clinical Status of Patient:  Outpatient (Ambulatory)    Chief Complaint:  Vel Ordonez is a 37 y.o. male who presents today for follow-up of mood disorder.  Met with patient.      Last visit was: 6/29/22.  Chart and  reviewed.     Interval History and Content of Current Session:  Current Psychiatric Medications/changes  Continue Depakote  mg po TID  DC Vistaril   Labs: ordered Valproic Acid level. Reviewed (28.7)  Continue Depakote  mg 3 tabs daily  Start Prozac 20 mg daily      Works at [employer] and a .  Affect appears bright with euthymic mood. Reports improved mood and anxiety with Prozac and denies side effects. Encouraged pt to establish care with a PCP and will order labs. Sleep and appetite regular. Denies SI/HI/AVH.     Labs: most recent results Valproic Acid 28.7 subtherapeutic    Psychotherapy:  Target symptoms: mood swings, mood disorder  Why chosen therapy is appropriate versus another modality: relevant to diagnosis  Outcome monitoring methods: self-report  Therapeutic intervention type: insight oriented psychotherapy  Topics discussed/themes: building skills sets for symptom management, symptom recognition  The patient's response to the intervention is accepting. The patient's progress toward treatment goals is good.   Duration of intervention: 13 minutes.    Review of Systems   PSYCHIATRIC: Pertinant items are noted in the narrative.  CONSTITUTIONAL: No weight gain or loss.   MUSCULOSKELETAL: No pain or stiffness of the joints.  NEUROLOGIC: No weakness, sensory changes, seizures, confusion, memory loss, tremor or other abnormal movements.  ENDOCRINE: No polydipsia or polyuria.  INTEGUMENTARY: No rashes or lacerations.  EYES: No exophthalmos, jaundice or blindness.  ENT: No dizziness, tinnitus or hearing loss.  RESPIRATORY: No shortness of breath.  CARDIOVASCULAR: No tachycardia or chest  pain.  GASTROINTESTINAL: No nausea, vomiting, pain, constipation or diarrhea.  GENITOURINARY: No frequency, dysuria or sexual dysfunction.  HEMATOLOGIC/LYMPHATIC: No excessive bleeding, prolonged or excessive bleeding after dental extraction/injury.  ALLERGIC/IMMUNOLOGIC: No allergic response to materials, foods or animals at this time.    Past Medical, Family and Social History: The patient's past medical, family and social history have been reviewed and updated as appropriate within the electronic medical record - see encounter notes.    Compliance: yes    Side effects: None    Risk Parameters:  Patient reports no suicidal ideation  Patient reports no homicidal ideation  Patient reports no self-injurious behavior  Patient reports no violent behavior    Exam (detailed: at least 9 elements; comprehensive: all 15 elements)   Constitutional  Vitals:  Most recent vital signs, dated greater than 90 days prior to this appointment, were reviewed.   There were no vitals filed for this visit.     General:  unremarkable, age appropriate     Musculoskeletal  Muscle Strength/Tone:  no tremor, no tic   Gait & Station:  non-ataxic     Psychiatric  Speech:  no latency; no press   Mood & Affect:  euthymic  congruent and appropriate   Thought Process:  normal and logical   Associations:  intact   Thought Content:  normal, no suicidality, no homicidality, delusions, or paranoia   Insight:  intact   Judgement: behavior is adequate to circumstances   Orientation:  grossly intact   Memory: intact for content of interview   Language: grossly intact   Attention Span & Concentration:  able to focus   Fund of Knowledge:  intact and appropriate to age and level of education     Assessment and Diagnosis   Status/Progress: Based on the examination today, the patient's problem(s) is/are adequately but not ideally controlled.  New problems have not been presented today.   Co-morbidities and Lack of compliance are not complicating management of  the primary condition.  There are no active rule-out diagnoses for this patient at this time.     General Impression:       ICD-10-CM ICD-9-CM   1. Bipolar 1 disorder  F31.9 296.7   2. Anxiety disorder, unspecified type  F41.9 300.00   3. Encounter for long-term current use of medication  Z79.899 V58.69     Intervention/Counseling/Treatment Plan   Medication Management: Continue current medications. The risks and benefits of medication were discussed with the patient.  Labs: ordered CMP, CBC, TSH, and Valproic Acid  Depakote  mg 3 tabs daily  Prozac 20 mg daily    Return to Clinic: 6 months     Risks, benefits, side effects and alternative treatments discussed with patient. Patient agrees with the current plan as documented.  Encouraged Patient to keep future appointments.  Take medications as prescribed and abstain from substance abuse.  Pt to present to ED for thoughts to harm himself or others

## 2023-07-19 ENCOUNTER — PATIENT MESSAGE (OUTPATIENT)
Dept: PSYCHIATRY | Facility: CLINIC | Age: 38
End: 2023-07-19

## 2023-07-26 DIAGNOSIS — F41.9 ANXIETY DISORDER, UNSPECIFIED TYPE: Primary | ICD-10-CM

## 2023-07-26 RX ORDER — DIAZEPAM 5 MG/1
5 TABLET ORAL DAILY PRN
Qty: 30 TABLET | Refills: 0 | Status: SHIPPED | OUTPATIENT
Start: 2023-07-26 | End: 2023-08-25

## 2023-07-28 ENCOUNTER — OFFICE VISIT (OUTPATIENT)
Dept: PSYCHIATRY | Facility: CLINIC | Age: 38
End: 2023-07-28
Payer: MEDICAID

## 2023-07-28 DIAGNOSIS — F41.9 ANXIETY DISORDER, UNSPECIFIED TYPE: ICD-10-CM

## 2023-07-28 DIAGNOSIS — F31.9 BIPOLAR 1 DISORDER: Primary | Chronic | ICD-10-CM

## 2023-07-28 DIAGNOSIS — G47.00 INSOMNIA DISORDER WITH NON-SLEEP DISORDER MENTAL COMORBIDITY: ICD-10-CM

## 2023-07-28 PROCEDURE — 99214 OFFICE O/P EST MOD 30 MIN: CPT | Mod: 95,,, | Performed by: NURSE PRACTITIONER

## 2023-07-28 PROCEDURE — 99214 PR OFFICE/OUTPT VISIT, EST, LEVL IV, 30-39 MIN: ICD-10-PCS | Mod: 95,,, | Performed by: NURSE PRACTITIONER

## 2023-07-28 RX ORDER — FLUOXETINE HYDROCHLORIDE 20 MG/1
20 CAPSULE ORAL DAILY
Qty: 90 CAPSULE | Refills: 3 | Status: SHIPPED | OUTPATIENT
Start: 2023-07-28

## 2023-07-28 RX ORDER — TRAZODONE HYDROCHLORIDE 150 MG/1
150 TABLET ORAL NIGHTLY
Qty: 90 TABLET | Refills: 3 | Status: SHIPPED | OUTPATIENT
Start: 2023-07-28

## 2023-07-28 RX ORDER — DIVALPROEX SODIUM 500 MG/1
500 TABLET, FILM COATED, EXTENDED RELEASE ORAL 3 TIMES DAILY
Qty: 270 TABLET | Refills: 3 | Status: SHIPPED | OUTPATIENT
Start: 2023-07-28

## 2023-07-28 NOTE — PROGRESS NOTES
Outpatient Psychiatry Follow-Up Visit (MD/NP)    7/28/2023    Clinical Status of Patient:  Outpatient (Ambulatory)    Chief Complaint:  Vel Ordonez is a 37 y.o. male who presents today for follow-up of mood disorder.  Met with patient.      Last visit was: 6/29/22.  Chart and  reviewed.   The patient location is: home  The chief complaint leading to consultation is: mood disorder    Visit type: audiovisual    Face to Face time with patient: 30 minutes  35 minutes of total time spent on the encounter, which includes face to face time and non-face to face time preparing to see the patient (eg, review of tests), Obtaining and/or reviewing separately obtained history, Documenting clinical information in the electronic or other health record, Independently interpreting results (not separately reported) and communicating results to the patient/family/caregiver, or Care coordination (not separately reported).     Each patient to whom he or she provides medical services by telemedicine is:  (1) informed of the relationship between the physician and patient and the respective role of any other health care provider with respect to management of the patient; and (2) notified that he or she may decline to receive medical services by telemedicine and may withdraw from such care at any time.    Interval History and Content of Current Session:  Current Psychiatric Medications/changes  Labs: ordered CMP, CBC, TSH, and Valproic Acid  Depakote  mg 3 tabs daily  Prozac 20 mg daily      Virtual Visit: Affect appears bright with euthymic mood. Reports improved mood and anxiety with Prozac and denies side effect.  Doing well. Reports some trouble sleeping. Will try Trazodone. Denies manic sx. Appetite regular. Denies SI/HI/AVH. Denies unmanaged anxiety. Mood stable.     Labs: most recent results Valproic Acid (69.7) therapeutic    Psychotherapy:  Target symptoms: mood swings, mood disorder  Why chosen therapy is  appropriate versus another modality: relevant to diagnosis  Outcome monitoring methods: self-report  Therapeutic intervention type: insight oriented psychotherapy  Topics discussed/themes: building skills sets for symptom management, symptom recognition  The patient's response to the intervention is accepting. The patient's progress toward treatment goals is good.   Duration of intervention: 13 minutes.    Review of Systems   PSYCHIATRIC: Pertinant items are noted in the narrative.  CONSTITUTIONAL: No weight gain or loss.   MUSCULOSKELETAL: No pain or stiffness of the joints.  NEUROLOGIC: No weakness, sensory changes, seizures, confusion, memory loss, tremor or other abnormal movements.  ENDOCRINE: No polydipsia or polyuria.  INTEGUMENTARY: No rashes or lacerations.  EYES: No exophthalmos, jaundice or blindness.  ENT: No dizziness, tinnitus or hearing loss.  RESPIRATORY: No shortness of breath.  CARDIOVASCULAR: No tachycardia or chest pain.  GASTROINTESTINAL: No nausea, vomiting, pain, constipation or diarrhea.  GENITOURINARY: No frequency, dysuria or sexual dysfunction.  HEMATOLOGIC/LYMPHATIC: No excessive bleeding, prolonged or excessive bleeding after dental extraction/injury.  ALLERGIC/IMMUNOLOGIC: No allergic response to materials, foods or animals at this time.    Past Medical, Family and Social History: The patient's past medical, family and social history have been reviewed and updated as appropriate within the electronic medical record - see encounter notes.    Compliance: yes    Side effects: None    Risk Parameters:  Patient reports no suicidal ideation  Patient reports no homicidal ideation  Patient reports no self-injurious behavior  Patient reports no violent behavior    Exam (detailed: at least 9 elements; comprehensive: all 15 elements)   Constitutional  Vitals:  Most recent vital signs, dated greater than 90 days prior to this appointment, were reviewed.   There were no vitals filed for this  visit.     General:  unremarkable, age appropriate     Musculoskeletal  Muscle Strength/Tone:  no tremor, no tic   Gait & Station:  non-ataxic     Psychiatric  Speech:  no latency; no press   Mood & Affect:  euthymic  congruent and appropriate   Thought Process:  normal and logical   Associations:  intact   Thought Content:  normal, no suicidality, no homicidality, delusions, or paranoia   Insight:  intact   Judgement: behavior is adequate to circumstances   Orientation:  grossly intact   Memory: intact for content of interview   Language: grossly intact   Attention Span & Concentration:  able to focus   Fund of Knowledge:  intact and appropriate to age and level of education     Assessment and Diagnosis   Status/Progress: Based on the examination today, the patient's problem(s) is/are adequately but not ideally controlled.  New problems have been presented today (insomnia).   Co-morbidities and Lack of compliance are not complicating management of the primary condition.  There are no active rule-out diagnoses for this patient at this time.     General Impression:       ICD-10-CM ICD-9-CM   1. Bipolar 1 disorder  F31.9 296.7   2. Anxiety disorder, unspecified type  F41.9 300.00   3. Insomnia disorder with non-sleep disorder mental comorbidity  G47.00 780.52       Intervention/Counseling/Treatment Plan   Medication Management: Continue current medications. The risks and benefits of medication were discussed with the patient.  Labs: reviewed CMP, CBC, TSH, and Valproic Acid (69.7)  Depakote  mg 3 tabs daily  Prozac 20 mg daily  Trazodone 150 mg before bed as needed for insomnia    Return to Clinic: 6 months     Risks, benefits, side effects and alternative treatments discussed with patient. Patient agrees with the current plan as documented.  Encouraged Patient to keep future appointments.  Take medications as prescribed and abstain from substance abuse.  Pt to present to ED for thoughts to harm himself or  others

## 2023-08-20 NOTE — TELEPHONE ENCOUNTER
Returned call to pt and procedure scheduled.         ----- Message from Sameera Beebe MA sent at 9/11/2018 12:32 PM CDT -----  Contact: Patient's girlfriend   RFA Procedure    ----- Message -----  From: Betsy Kelsey  Sent: 9/11/2018   9:18 AM  To: [facility]    The Pt's girlfriend is calling to speak with you regarding his ablation. Please call her back @ [phone]. Thanks, Betsy       
R lower arm pain x dialysis fistula site pain x today.   Dialysis MWF. Last dialysis friday.

## 2024-07-12 ENCOUNTER — TELEPHONE (OUTPATIENT)
Dept: PRIMARY CARE CLINIC | Facility: CLINIC | Age: 39
End: 2024-07-12
Payer: COMMERCIAL

## 2024-07-12 NOTE — TELEPHONE ENCOUNTER
Spoke with patient re; scheduling a new patient visit with .  next new patient visit is not until, January 2025. Scheduled patient for /8/2025 at 9:00, and will put on the wait list if there is any cancellations before then. Pt expressed understanding and all questions were answered.

## 2024-07-12 NOTE — TELEPHONE ENCOUNTER
----- Message from Tariq  sent at 7/11/2024  3:38 PM CDT -----  Regarding: PCP Request  Contact: Pt [phone]  [email or web address]     Patient is calling for Medical Advice regarding: Pt wants Dr. Ceja as his PCP. I saw she did not have anything available, but he said he wanted me to send a message just to get a callback to confirm. He said Dr. Ceja sees his family.    How long has patient had these symptoms:    Pharmacy name and phone#:    Patient wants a call back or thru [facility]: Call    Comments:    Please advise patient replies from provider may take up to 48 hours.   [FreeTextEntry1] : Patient is followed with the principal diagnosis of atrial flutter which resolved very successfully with an ablation in January 2019. The patient has done extremely well since that time. There have been no acute cardiac symptoms. The patient has had no symptoms of chest pain shortness of breath dizziness lightheadedness or palpitations. He tries to maintain a program of exercise although this has been somewhat limited during the winter months.

## 2024-10-10 ENCOUNTER — HOSPITAL ENCOUNTER (EMERGENCY)
Facility: HOSPITAL | Age: 39
Discharge: HOME | End: 2024-10-10
Attending: INTERNAL MEDICINE
Payer: COMMERCIAL

## 2024-10-10 VITALS
WEIGHT: 280 LBS | HEART RATE: 85 BPM | RESPIRATION RATE: 20 BRPM | TEMPERATURE: 98 F | SYSTOLIC BLOOD PRESSURE: 120 MMHG | HEIGHT: 76 IN | OXYGEN SATURATION: 97 % | BODY MASS INDEX: 34.1 KG/M2 | DIASTOLIC BLOOD PRESSURE: 81 MMHG

## 2024-10-10 DIAGNOSIS — G44.209 TENSION HEADACHE: Primary | ICD-10-CM

## 2024-10-10 DIAGNOSIS — R42 DIZZINESS: ICD-10-CM

## 2024-10-10 LAB
ALBUMIN SERPL-MCNC: 4.1 G/DL (ref 3.3–5.5)
ALP SERPL-CCNC: 69 U/L (ref 42–141)
BILIRUB SERPL-MCNC: 0.6 MG/DL (ref 0.2–1.6)
BUN SERPL-MCNC: 14 MG/DL (ref 7–22)
CALCIUM SERPL-MCNC: 10 MG/DL (ref 8–10.3)
CHLORIDE SERPL-SCNC: 108 MMOL/L (ref 98–108)
CREAT SERPL-MCNC: 1 MG/DL (ref 0.6–1.2)
GLUCOSE SERPL-MCNC: 129 MG/DL (ref 73–118)
HCT, POC: NORMAL
HGB, POC: NORMAL (ref 14–18)
MCH, POC: NORMAL
MCHC, POC: NORMAL
MCV, POC: NORMAL
MPV, POC: NORMAL
OHS QRS DURATION: 118 MS
OHS QTC CALCULATION: 450 MS
POC ALT (SGPT): 34 U/L (ref 10–47)
POC AST (SGOT): 28 U/L (ref 11–38)
POC PLATELET COUNT: NORMAL
POC TCO2: 30 MMOL/L (ref 18–33)
POTASSIUM BLD-SCNC: 4.5 MMOL/L (ref 3.6–5.1)
PROTEIN, POC: 7.5 G/DL (ref 6.4–8.1)
RBC, POC: NORMAL
RDW, POC: NORMAL
SODIUM BLD-SCNC: 144 MMOL/L (ref 128–145)
WBC, POC: NORMAL

## 2024-10-10 PROCEDURE — 85025 COMPLETE CBC W/AUTO DIFF WBC: CPT | Mod: ER

## 2024-10-10 PROCEDURE — 96360 HYDRATION IV INFUSION INIT: CPT | Mod: ER

## 2024-10-10 PROCEDURE — 93005 ELECTROCARDIOGRAM TRACING: CPT | Mod: ER

## 2024-10-10 PROCEDURE — 80053 COMPREHEN METABOLIC PANEL: CPT | Mod: ER

## 2024-10-10 PROCEDURE — 99284 EMERGENCY DEPT VISIT MOD MDM: CPT | Mod: 25,ER

## 2024-10-10 PROCEDURE — 93010 ELECTROCARDIOGRAM REPORT: CPT | Mod: ,,, | Performed by: INTERNAL MEDICINE

## 2024-10-10 PROCEDURE — 25000003 PHARM REV CODE 250: Mod: ER | Performed by: INTERNAL MEDICINE

## 2024-10-10 RX ORDER — BUTALBITAL, ACETAMINOPHEN AND CAFFEINE 50; 325; 40 MG/1; MG/1; MG/1
2 TABLET ORAL
Status: COMPLETED | OUTPATIENT
Start: 2024-10-10 | End: 2024-10-10

## 2024-10-10 RX ORDER — BUTALBITAL, ACETAMINOPHEN AND CAFFEINE 50; 325; 40 MG/1; MG/1; MG/1
2 TABLET ORAL EVERY 8 HOURS PRN
Qty: 30 TABLET | Refills: 0 | Status: SHIPPED | OUTPATIENT
Start: 2024-10-10 | End: 2024-11-09

## 2024-10-10 RX ADMIN — BUTALBITAL, ACETAMINOPHEN, AND CAFFEINE 2 TABLET: 325; 50; 40 TABLET ORAL at 04:10

## 2024-10-10 RX ADMIN — SODIUM CHLORIDE 1000 ML: 9 INJECTION, SOLUTION INTRAVENOUS at 04:10

## 2024-10-10 NOTE — ED PROVIDER NOTES
Encounter Date: 10/10/2024       History     Chief Complaint   Patient presents with    Headache    Dizziness     PT REPORTS H/A, DIZZINESS OFF AND ON FOR 1 WEEK     39-year-old male with a past medical history significant for anxiety disorder/bipolar 1 disorder/depression/diabetes mellitus type 2/hypertension presents to the emergency department complaining of headache and intermittent dizziness x1 week.  Denies fever/chills/nausea/vomiting/chest pain/shortness breath.    The history is provided by the patient. No  was used.   Headache   This is a new problem. Episode onset: 1 week. The problem occurs intermittently. The problem has been waxing and waning. The pain is located in the Bilateral region. The pain quality is similar to prior headaches. The quality of the pain is described as aching. Pertinent negatives include no coughing, fever, nausea, numbness, seizures, vomiting or weakness.     Review of patient's allergies indicates:  No Known Allergies  Past Medical History:   Diagnosis Date    Anxiety disorder 07/03/2022    Atrial flutter     Bipolar 1 disorder 2011    Depression     Diabetes mellitus     Hx of psychiatric care     Hypertension     Psychiatric problem     Supraventricular tachycardia     Therapy      Past Surgical History:   Procedure Laterality Date    ABLATION N/A 09/17/2018    Procedure: ABLATION;  Surgeon: Demian Davis MD;  Location: [facility] CATH LAB;  Service: Cardiology;  Laterality: N/A;  SVT, RFA, CHELSIE, MAC, DM, 3 PREP    SLEEP APNEA MONITOR INSERTION       Family History   Problem Relation Name Age of Onset    Cancer Mother      Heart disease Father      Diabetes Father       Social History     Tobacco Use    Smoking status: Never    Smokeless tobacco: Never   Substance Use Topics    Alcohol use: No    Drug use: No     Review of Systems   Constitutional:  Negative for chills and fever.   Respiratory:  Negative for cough.    Cardiovascular:  Negative for chest pain.    Gastrointestinal:  Negative for nausea and vomiting.   Neurological:  Positive for light-headedness and headaches. Negative for tremors, seizures, syncope, facial asymmetry, speech difficulty, weakness and numbness.   All other systems reviewed and are negative.      Physical Exam     Initial Vitals [10/10/24 0408]   BP Pulse Resp Temp SpO2   (!) 134/90 80 20 97.8 °F (36.6 °C) 97 %      MAP       --         Physical Exam    Nursing note and vitals reviewed.  Constitutional: He appears well-developed and well-nourished.   Obese   HENT:   Head: Normocephalic and atraumatic.   Right Ear: External ear normal.   Left Ear: External ear normal. Mouth/Throat: Oropharynx is clear and moist.   Eyes: Conjunctivae and EOM are normal. Pupils are equal, round, and reactive to light.   Neck: Neck supple.   Normal range of motion.  Cardiovascular:  Normal rate, regular rhythm and normal heart sounds.           Pulmonary/Chest: Breath sounds normal. No respiratory distress. He has no wheezes.   Abdominal: Abdomen is soft. Bowel sounds are normal. He exhibits no distension.   Musculoskeletal:         General: Normal range of motion.      Cervical back: Normal range of motion and neck supple.     Neurological: He is alert and oriented to person, place, and time. He has normal strength. No cranial nerve deficit.   Skin: Skin is warm and dry. Capillary refill takes less than 2 seconds.   Psychiatric: He has a normal mood and affect.         ED Course   Procedures  Labs Reviewed   POCT CMP - Abnormal       Result Value    Albumin, POC 4.1      Alkaline Phosphatase, POC 69      ALT (SGPT), POC 34      AST (SGOT), POC 28      POC BUN 14      Calcium, POC 10.0      POC Chloride 108      POC Creatinine 1.0      POC Glucose 129 (*)     POC Potassium 4.5      POC Sodium 144      Bilirubin, POC 0.6      POC TCO2 30      Protein, POC 7.5     POCT CBC    Hematocrit        Hemoglobin        RBC        WBC        MCV        MCH, POC        MCHC         RDW-CV        Platelet Count, POC        MPV       POCT CMP     EKG Readings: (Independently Interpreted)   Normal sinus rhythm, rate of 82 beats per minute, nonspecific ST and T-wave changes, no STEMI     ECG Results              EKG 12-lead (Final result)        Collection Time Result Time QRS Duration OHS QTC Calculation    10/10/24 04:31:53 10/10/24 07:28:41 118 450                     Final result by Interface, Lab In [facility] (10/10/24 07:28:45)                   Narrative:    Test Reason : DIZZINESS    Vent. Rate : 082 BPM     Atrial Rate : 082 BPM     P-R Int : 158 ms          QRS Dur : 118 ms      QT Int : 386 ms       P-R-T Axes : 046 -33 032 degrees     QTc Int : 450 ms    Normal sinus rhythm  Left axis deviation  Incomplete right bundle branch block  Minimal voltage criteria for LVH, may be normal variant ( R in aVL )  Abnormal ECG  When compared with ECG of 15-SEP-2021 09:34,  Nonspecific T wave abnormality has replaced inverted T waves in Inferior  leads  Nonspecific T wave abnormality, improved in Anterior leads  Confirmed by Asher Lozano MD ([id number]) on 10/10/2024 7:28:38 AM    Referred By: CORINNE   SELF           Confirmed By:Asher Lozano MD                                  Imaging Results    None          Medications   sodium chloride 0.9% bolus 1,000 mL 1,000 mL (0 mLs Intravenous Stopped 10/10/24 4534)   butalbital-acetaminophen-caffeine -40 mg per tablet 2 tablet (2 tablets Oral Given 10/10/24 7622)     Medical Decision Making  39-year-old male with a past medical history significant for anxiety disorder/bipolar 1 disorder/depression/diabetes mellitus type 2/hypertension presents to the emergency department complaining of headache and intermittent dizziness x1 week.  Denies fever/chills/nausea/vomiting/chest pain/shortness breath.  Course of ED stay:   CBC and CMP were reassuring.  EKG showed no acute abnormalities.  Normal saline bolus and Fioricet were given.   Headache/dizziness improved after fluids and Fioricet.  Instructions for tension headache were given and the patient was advised to follow up with his primary care physician within the next week for re-evaluation/return to the emergency department if condition worsens.    Amount and/or Complexity of Data Reviewed  Labs: ordered.    Risk  Prescription drug management.                                      Clinical Impression:  Final diagnoses:  [R42] Dizziness  [G44.209] Tension headache (Primary)          ED Disposition Condition    Discharge Stable          ED Prescriptions       Medication Sig Dispense Start Date End Date Auth. Provider    butalbital-acetaminophen-caffeine -40 mg (FIORICET, ESGIC) -40 mg per tablet Take 2 tablets by mouth every 8 (eight) hours as needed for Headaches. 30 tablet 10/10/2024 11/9/2024 Bigg Calzada MD          Follow-up Information       Follow up With Specialties Details Why Contact [facility]  Schedule an appointment as soon as possible for a visit in 2 days For reevaluation [address] [address]  [phone]               Bigg Calzada MD  10/11/24 0105

## 2024-12-11 ENCOUNTER — HOSPITAL ENCOUNTER (EMERGENCY)
Facility: HOSPITAL | Age: 39
Discharge: HOME | End: 2024-12-11
Attending: STUDENT IN AN ORGANIZED HEALTH CARE EDUCATION/TRAINING PROGRAM
Payer: COMMERCIAL

## 2024-12-11 VITALS
BODY MASS INDEX: 33 KG/M2 | HEIGHT: 76 IN | DIASTOLIC BLOOD PRESSURE: 90 MMHG | SYSTOLIC BLOOD PRESSURE: 148 MMHG | OXYGEN SATURATION: 99 % | TEMPERATURE: 99 F | WEIGHT: 271 LBS | RESPIRATION RATE: 18 BRPM | HEART RATE: 82 BPM

## 2024-12-11 DIAGNOSIS — S01.81XA LACERATION OF FOREHEAD, INITIAL ENCOUNTER: Primary | ICD-10-CM

## 2024-12-11 PROCEDURE — 12011 RPR F/E/E/N/L/M 2.5 CM/<: CPT

## 2024-12-11 PROCEDURE — 25000003 PHARM REV CODE 250: Performed by: STUDENT IN AN ORGANIZED HEALTH CARE EDUCATION/TRAINING PROGRAM

## 2024-12-11 PROCEDURE — 63600175 PHARM REV CODE 636 W HCPCS: Performed by: STUDENT IN AN ORGANIZED HEALTH CARE EDUCATION/TRAINING PROGRAM

## 2024-12-11 PROCEDURE — 99284 EMERGENCY DEPT VISIT MOD MDM: CPT | Mod: 25

## 2024-12-11 RX ORDER — LIDOCAINE HYDROCHLORIDE AND EPINEPHRINE 10; 10 UG/ML; MG/ML
10 INJECTION, SOLUTION INFILTRATION; PERINEURAL ONCE
Status: COMPLETED | OUTPATIENT
Start: 2024-12-11 | End: 2024-12-11

## 2024-12-11 RX ORDER — LIDOCAINE HYDROCHLORIDE 10 MG/ML
10 INJECTION, SOLUTION INFILTRATION; PERINEURAL
Status: DISCONTINUED | OUTPATIENT
Start: 2024-12-11 | End: 2024-12-11

## 2024-12-11 RX ADMIN — BACITRACIN ZINC, NEOMYCIN, POLYMYXIN B 1 EACH: 400; 3.5; 5 OINTMENT TOPICAL at 02:12

## 2024-12-11 RX ADMIN — LIDOCAINE HYDROCHLORIDE,EPINEPHRINE BITARTRATE 10 ML: 10; .01 INJECTION, SOLUTION INFILTRATION; PERINEURAL at 01:12

## 2024-12-11 NOTE — ED PROVIDER NOTES
Encounter Date: 12/11/2024    SCRIBE #1 NOTE: I, Patrick Mcgregor, am scribing for, and in the presence of,  Terri Anderson DO. Other sections scribed: HPI, ROS, PE, MDM.       History     Chief Complaint   Patient presents with    Laceration     RIGHT eyebrow appx 30 minutes PTA after walking into a lift. Scant bleeding noted. Uses eliquis. Denies LOC. Tetanus UTD. Having slight HA. Denies vision changes or dizziness.      CC: Laceration    HPI: Patient is a 39 y.o. M with a PMHx of Atrial flutter, Bipolar 1 disorder, DM, HTN, and Supraventricular tachycardia who presents to the ED for emergent evaluation of laceration above the right eyebrow that sustained after he accidentally walking into a car lift at [employer].  Denies other injuries.  Pt also complains of headache that he rates a 5/10. Pt's tetanus is up to date. Pt is on blood thinners for atrial flutter. Pt denies vision changes, CP, SOB, nausea, vomiting, syncope, neck pain, weakness, paresthesias or other associated symptoms.     The history is provided by the patient. No  was used.     Review of patient's allergies indicates:  No Known Allergies  Past Medical History:   Diagnosis Date    Anxiety disorder 07/03/2022    Atrial flutter     Bipolar 1 disorder 2011    Depression     Diabetes mellitus     Hx of psychiatric care     Hypertension     Psychiatric problem     Supraventricular tachycardia     Therapy      Past Surgical History:   Procedure Laterality Date    ABLATION N/A 09/17/2018    Procedure: ABLATION;  Surgeon: Demian Davis MD;  Location: [facility] CATH LAB;  Service: Cardiology;  Laterality: N/A;  SVT, RFA, CHELSIE, MAC, DM, 3 PREP    SLEEP APNEA MONITOR INSERTION       Family History   Problem Relation Name Age of Onset    Cancer Mother      Heart disease Father      Diabetes Father       Social History     Tobacco Use    Smoking status: Never    Smokeless tobacco: Never   Substance Use Topics    Alcohol use: No    Drug  use: No     Review of Systems   Constitutional:  Negative for chills and fever.   HENT:  Negative for congestion, ear discharge, ear pain, rhinorrhea, sore throat and trouble swallowing.    Eyes:  Negative for visual disturbance.   Respiratory:  Negative for cough and shortness of breath.    Cardiovascular:  Negative for chest pain and leg swelling.   Gastrointestinal:  Negative for abdominal pain, diarrhea, nausea and vomiting.   Genitourinary:  Negative for dysuria.   Musculoskeletal:  Negative for back pain, neck pain and neck stiffness.   Skin:  Positive for wound. Negative for color change and rash.   Neurological:  Positive for headaches. Negative for seizures, syncope, speech difficulty and weakness.   Psychiatric/Behavioral:  Negative for confusion.        Physical Exam     Initial Vitals [12/11/24 1125]   BP Pulse Resp Temp SpO2   (!) 144/89 87 17 98 °F (36.7 °C) 100 %      MAP       --         Physical Exam    Nursing note and vitals reviewed.  Constitutional: He appears well-developed and well-nourished. He is not diaphoretic.  Non-toxic appearance. He does not have a sickly appearance. He does not appear ill.   HENT:   Head: Normocephalic. Head is with laceration (2 cm, no active bleeding). Head is without raccoon's eyes and without Vaz's sign.   Right Ear: Tympanic membrane, external ear and ear canal normal.   Left Ear: Tympanic membrane, external ear and ear canal normal.   Nose: Nose normal. Mouth/Throat: Oropharynx is clear and moist and mucous membranes are normal. No oropharyngeal exudate.   Patent   Eyes: Conjunctivae and EOM are normal. Pupils are equal, round, and reactive to light. Right conjunctiva is not injected. Left conjunctiva is not injected. No scleral icterus.   sclera anicteric   Neck: Neck supple. No JVD present.   Normal range of motion.   Full passive range of motion without pain.     Cardiovascular:  Normal rate, regular rhythm, S1 normal, S2 normal, normal heart sounds and  intact distal pulses.     Exam reveals no gallop and no friction rub.       No murmur heard.  Pulses:       Radial pulses are 2+ on the right side and 2+ on the left side.   Pulmonary/Chest: Effort normal and breath sounds normal. No stridor. No respiratory distress.   Abdominal: Abdomen is soft. He exhibits no distension. There is no abdominal tenderness. There is no rebound and no guarding.   Musculoskeletal:         General: No tenderness or edema. Normal range of motion.      Cervical back: Full passive range of motion without pain, normal range of motion and neck supple.      Comments: Good active ROM of all extremities. No lower extremity edema or cyanosis.     Neurological: He is alert and oriented to person, place, and time. He has normal strength. No cranial nerve deficit or sensory deficit. Gait normal.   A&Ox4, normal speech   Skin: Skin is warm and dry. No ecchymosis and no rash noted. No erythema.   Laceration above the right eyebrow.   Psychiatric: He has a normal mood and affect. Thought content normal.         ED Course   Lac Repair    Date/Time: 12/11/2024 1:31 PM    Performed by: Karla Molina PA-C  Authorized by: Terri Anderson, DO    Consent:     Consent obtained:  Verbal    Consent given by:  Patient    Risks discussed:  Infection, poor cosmetic result and poor wound healing    Alternatives discussed:  No treatment and delayed treatment  Universal protocol:     Patient identity confirmed:  Verbally with patient  Anesthesia:     Anesthesia method:  Local infiltration    Local anesthetic:  Lidocaine 1% WITH epi  Laceration details:     Location:  Face    Face location:  R eyebrow    Length (cm):  2  Pre-procedure details:     Preparation:  Patient was prepped and draped in usual sterile fashion and imaging obtained to evaluate for foreign bodies  Exploration:     Hemostasis achieved with:  Direct pressure    Imaging outcome: foreign body not noted      Wound exploration: wound  explored through full range of motion    Treatment:     Area cleansed with:  Chlorhexidine and saline    Amount of cleaning:  Standard    Irrigation solution:  Sterile saline  Skin repair:     Repair method:  Sutures    Suture size:  6-0    Suture material:  Prolene    Suture technique:  Simple interrupted    Number of sutures:  7 (2 5-0 vicryl rapide subcutaneous; 5 6-0 prolene simple interrupted)  Approximation:     Approximation:  Close  Repair type:     Repair type:  Simple  Post-procedure details:     Procedure completion:  Tolerated well, no immediate complications  Comments:      Assisted Dr. Howell with sutures as detailed above.  Karla Molina PA-C   12/11/24 1333    Labs Reviewed - No data to display       Imaging Results              CT Head Without Contrast (Final result)  Result time 12/11/24 13:37:59      Final result by Casa Vance MD (12/11/24 13:37:59)                   Impression:      1. No acute intracranial abnormality.  2. No maxillofacial acute displaced fracture-dislocation identified.  3. Partially imaged catheter/lead at the right submandibular gland extending caudally along the right submandibular region out of field of view.  There mild overlying soft tissue fat stranding with skin thickening may reflect sequela of relatively recent operative change versus nonspecific cellulitis.  No drainable fluid collection seen.  Clinical correlation advised.  Further evaluation/follow-up as warranted.      Electronically signed by: Casa Vance MD  Date:    12/11/2024  Time:    13:37               Narrative:    EXAMINATION:  CT HEAD WITHOUT CONTRAST; CT MAXILLOFACIAL WITHOUT CONTRAST    CLINICAL HISTORY:  Facial trauma, blunt;    TECHNIQUE:  Low dose axial CT images obtained throughout the head and maxillofacial region without intravenous contrast. Sagittal and coronal reconstructions were performed.    COMPARISON:  Head CT 08/11/2018    FINDINGS:  Head CT:    Intracranial  compartment:    Ventricles and sulci are normal in size for age without evidence of hydrocephalus. No extra-axial blood or fluid collections.    The brain parenchyma appears normal. No parenchymal mass, hemorrhage, edema or major vascular distribution infarct.    Skull/extracranial contents (limited evaluation): No fracture.    Maxillofacial CT: Beam hardening with streak artifact from dental amalgam somewhat limits evaluation.    Bilateral orbits appear intact.  Both ocular lenses are anteriorly located.  Bilateral orbital rims, zygomatic arches, pterygoid plates, mandibular condyles, TMJs and nasal bones appear relatively symmetric and intact.  Slight leftward deviation of the bony nasal septum which is otherwise intact.  Minimal mucosal thickening in the bilateral maxillary sinuses.  No air-fluid levels.  Remaining paranasal sinuses, middle ears, external auditory canals and mastoid air cells are clear.  7-8 mm coarse calcification within the subcutaneous fat overlying the upper left mandible, nonspecific.  No subcutaneous emphysema or radiopaque foreign body.    Included airway is midline and patent.  Bilateral parotid glands are symmetric and within normal limits.  Left submandibular gland is within normal limits.  Right submandibular gland slightly larger and heterogeneous when compared to the left, and contains a partially imaged in curvilinear radiodense structure with portion extending out of the gland along the anterior submandibular region out of field of view.  There is mild soft tissue stranding with skin thickening and slight asymmetric thickening of the right platysma at the right submandibular region overlying the submandibular gland, likely related to prior operative change, with focal cellulitis not excluded.  No drainable soft tissue fluid collection seen.    Minimal to mild degenerative change of the partially imaged upper cervical spine.  No acute or destructive osseous process seen.                                        CT Maxillofacial Without Contrast (Final result)  Result time 12/11/24 13:37:59      Final result by Casa Vance MD (12/11/24 13:37:59)                   Impression:      1. No acute intracranial abnormality.  2. No maxillofacial acute displaced fracture-dislocation identified.  3. Partially imaged catheter/lead at the right submandibular gland extending caudally along the right submandibular region out of field of view.  There mild overlying soft tissue fat stranding with skin thickening may reflect sequela of relatively recent operative change versus nonspecific cellulitis.  No drainable fluid collection seen.  Clinical correlation advised.  Further evaluation/follow-up as warranted.      Electronically signed by: Casa Vance MD  Date:    12/11/2024  Time:    13:37               Narrative:    EXAMINATION:  CT HEAD WITHOUT CONTRAST; CT MAXILLOFACIAL WITHOUT CONTRAST    CLINICAL HISTORY:  Facial trauma, blunt;    TECHNIQUE:  Low dose axial CT images obtained throughout the head and maxillofacial region without intravenous contrast. Sagittal and coronal reconstructions were performed.    COMPARISON:  Head CT 08/11/2018    FINDINGS:  Head CT:    Intracranial compartment:    Ventricles and sulci are normal in size for age without evidence of hydrocephalus. No extra-axial blood or fluid collections.    The brain parenchyma appears normal. No parenchymal mass, hemorrhage, edema or major vascular distribution infarct.    Skull/extracranial contents (limited evaluation): No fracture.    Maxillofacial CT: Beam hardening with streak artifact from dental amalgam somewhat limits evaluation.    Bilateral orbits appear intact.  Both ocular lenses are anteriorly located.  Bilateral orbital rims, zygomatic arches, pterygoid plates, mandibular condyles, TMJs and nasal bones appear relatively symmetric and intact.  Slight leftward deviation of the bony nasal septum which is otherwise intact.  Minimal  mucosal thickening in the bilateral maxillary sinuses.  No air-fluid levels.  Remaining paranasal sinuses, middle ears, external auditory canals and mastoid air cells are clear.  7-8 mm coarse calcification within the subcutaneous fat overlying the upper left mandible, nonspecific.  No subcutaneous emphysema or radiopaque foreign body.    Included airway is midline and patent.  Bilateral parotid glands are symmetric and within normal limits.  Left submandibular gland is within normal limits.  Right submandibular gland slightly larger and heterogeneous when compared to the left, and contains a partially imaged in curvilinear radiodense structure with portion extending out of the gland along the anterior submandibular region out of field of view.  There is mild soft tissue stranding with skin thickening and slight asymmetric thickening of the right platysma at the right submandibular region overlying the submandibular gland, likely related to prior operative change, with focal cellulitis not excluded.  No drainable soft tissue fluid collection seen.    Minimal to mild degenerative change of the partially imaged upper cervical spine.  No acute or destructive osseous process seen.                                       Medications   LIDOcaine-EPINEPHrine 1%-1:100,000 injection 10 mL (10 mLs Intradermal Given by Other 12/11/24 1323)   neomycin-bacitracnZn-polymyxnB packet 1 each (1 each Topical (Top) Given 12/11/24 1415)     Medical Decision Making   MDM  This is an emergent evaluation of a 39 y.o. M with a PMHx of Atrial flutter, Bipolar 1 disorder, DM, HTN, and Supraventricular tachycardia who presents to the ED for emergent evaluation of laceration above the right eyebrow that sustained after walking into a car lift at work today. Initial vitals in the ED  [12/11/24 1125]  BP: (!) 144/89  Pulse: 87  Resp: 17  Temp: 98 °F (36.7 °C)  SpO2: 100 % .     Physical exam noted above. DDx includes but is not limited to  laceration, hematoma, facial bone fracture. Also considered but clinically less likely to be meningitis, stroke, subarachnoid hemorrhage, subdural hematoma, epidural hematoma. Will obtain imaging including CT head without contrast, CT max face without contrast.  No labs clinically indicated at this time.  Will also provide laceration repair and pain control as needed. Will continue to monitor and frequently reassess pending results of labs, treatments and final disposition.    Patient is aware of plan and is amenable.     Terri Anderson D.O  EMERGENCY MEDICINE  1:06 PM 12/11/2024      UPDATE  Cts reveals no acute intracranial abnormality.  There is also no evidence of acute displaced fractures or dislocations.  Laceration was repaired without complication.  Please see above procedure note.  On reassessment, patient is asymptomatic.  Will provide wound care, PCP follow-up in ED return precautions.  Patient is aware and agreeable to plan.    Terri Anderson D.O  EMERGENCY MEDICINE   2:16 PM 12/11/2024         This chart was completed using dictation software, as a result there may be some transcription errors      Amount and/or Complexity of Data Reviewed  Radiology: ordered and independent interpretation performed. Decision-making details documented in ED Course.    Risk  OTC drugs.  Prescription drug management.            Scribe Attestation:   Scribe #1: I performed the above scribed service and the documentation accurately describes the services I performed. I attest to the accuracy of the note.                           I, Terri Anderson, DO, personally performed the services described in this documentation. All medical record entries made by the scribe were at my direction and in my presence. I have reviewed the chart and agree that the record reflects my personal performance and is accurate and complete.      DISCLAIMER: This note was prepared with MModal voice recognition transcription  software. Garbled syntax, mangled pronouns, and other bizarre constructions may be attributed to that software system.      Clinical Impression:  Final diagnoses:  [S01.81XA] Laceration of forehead, initial encounter (Primary)          ED Disposition Condition    Discharge Stable          ED Prescriptions    None       Follow-up Information       Follow up With Specialties Details Why Contact Info    [facility] Emergency Medicine Go to  If symptoms worsen, For suture removal [address] [facility] [place] [address] [phone]    Your primary care physician  Schedule an appointment as soon as possible for a visit in 1 week Emergency Room Follow-up, For suture removal              Terri Anderson, DO  12/11/24 1410

## 2024-12-11 NOTE — ED NOTES
"Pt walked into a car lift today hitting right forehead. No LOC, pt takes blood thinners for A Flutter.  Denies midline c-spine tenderness.  1" lac noted to right forehead.  Pt c/o HA. Denies dizziness, vision changes or any other trauma.  Pt undressed and placed in hospital gown.  Pt is AAOx3, resp even and unlabored, skin warm and dry.  HOB elevated, SR up x 2, Call bell within reach. NAD noted.  "

## 2024-12-11 NOTE — DISCHARGE INSTRUCTIONS
Thank you for coming to our Emergency Department today. It is important to remember that some problems or medical conditions are difficult to diagnose and may not be found during your Emergency Department visit.     Be sure to follow up with your primary care doctor and review all labs/imaging/tests that were performed during your ER visit with them. Some labs/tests may be outside of the normal range and require non-emergent follow-up and further investigation to help diagnose/exclude/prevent complications or other potentially serious medical conditions that were not addressed during your ER visit.    If you do not have a primary care doctor, you may contact the one listed on your discharge paperwork or you may also call the [facility] Appointment Desk at [phone] to schedule an appointment and establish care with one. It is important to your health that you have a primary care doctor.    Please take all medications as directed. All medications may potentially have side-effects and it is impossible to predict which medications may give you side-effects or what side-effects (if any) they will give you.. If you feel that you are having a negative effect or side-effect of any medication you should immediately stop taking them and seek medical attention. If you feel that you are having a life-threatening reaction call 911.    Return to the ER with any questions/concerns, new/concerning symptoms, worsening or failure to improve.     Do not drive, swim, climb to height, take a bath, operate heavy machinery, drink alcohol or take potentially sedating medications, sign any legal documents or make any important decisions for 24 hours if you have received any pain medications, sedatives or mood altering drugs during your ER visit or within 24 hours of taking them if they have been prescribed to you.     You can find additional resources for Dentists, hearing aids, durable medical equipment, low cost pharmacies and  other resources at [email or web address].org    BELOW THIS LINE ONLY APPLIES IF YOU HAVE A COVID TEST PENDING OR IF YOU HAVE BEEN DIAGNOSED WITH COVID:  Please access [facility] to review the results of your test. Until the results of your COVID test return, you should isolate yourself so as not to potentially spread illness to others.   If your COVID test returns positive, you should isolate yourself so as not to spread illness to others. After five full days, if you are feeling better and you have not had fever for 24 hours, you can return to your typical daily activities, but you must wear a mask around others for an additional 5 days.   If your COVID test returns negative and you are either unvaccinated or more than six months out from your two-dose vaccine and are not yet boosted, you should still quarantine for 5 full days followed by strict mask use for an additional 5 full days.   If your COVID test returns negative and you have received your 2-dose initial vaccine as well as a booster, you should continue strict mask use for 10 full days after the exposure.  For all those exposed, best practice includes a test at day 5 after the exposure. This can be a home test or a test through one of the many testing centers throughout our community.   Masking is always advised to limit the spread of COVID. Cdc.gov is an excellent site to obtain the latest up to date recommendations regarding COVID and COVID testing.     CDC Testing and Quarantine Guidelines for patients with exposure to a known-positive COVID-19 person:  A close exposure is defined as anyone who has had an exposure (masked or unmasked) to a known COVID -19 positive person within 6 feet of someone for a cumulative total of 15 minutes or more over a 24-hour period.   Vaccinated and/or if you recently had a positive covid test within 90 days do NOT need to quarantine after contact with someone who had COVID-19 unless you develop symptoms.   Fully vaccinated  people who have not had a positive test within 90 days, should get tested 3-5 days after their exposure, even if they don't have symptoms and wear a mask indoors in public for 14 days following exposure or until their test result is negative.      Unvaccinated and/or NOT had a positive test within 90 days and meet close exposure  You are required by CDC guidelines to quarantine for at least 5 days from time of exposure followed by 5 days of strict masking. It is recommended, but not required to test after 5 days, unless you develop symptoms, in which case you should test at that time.  If you get tested after 5 days and your test is positive, your 5 day period of isolation starts the day of the positive test.    If your exposure does not meet the above definition, you can return to your normal daily activities to include social distancing, wearing a mask and frequent handwashing.      Here is a link to guidance from the CDC:  https://www.cdc.gov/media/releases/2021/s1227-isolation-quarantine-guidance.html      [place] Dept Of Health Testing Sites:  https://ldh.[place].gov/page/3934      [facility] website with testing locations and guidance:  [email or web address]

## 2024-12-13 ENCOUNTER — HOSPITAL ENCOUNTER (EMERGENCY)
Facility: HOSPITAL | Age: 39
Discharge: HOME | End: 2024-12-13
Attending: EMERGENCY MEDICINE
Payer: COMMERCIAL

## 2024-12-13 VITALS
SYSTOLIC BLOOD PRESSURE: 107 MMHG | OXYGEN SATURATION: 97 % | HEIGHT: 76 IN | HEART RATE: 86 BPM | DIASTOLIC BLOOD PRESSURE: 59 MMHG | TEMPERATURE: 98 F | RESPIRATION RATE: 18 BRPM | BODY MASS INDEX: 33 KG/M2 | WEIGHT: 271 LBS

## 2024-12-13 DIAGNOSIS — Z51.89 VISIT FOR WOUND CHECK: Primary | ICD-10-CM

## 2024-12-13 PROCEDURE — 99282 EMERGENCY DEPT VISIT SF MDM: CPT

## 2024-12-13 NOTE — DISCHARGE INSTRUCTIONS

## 2024-12-14 NOTE — ED PROVIDER NOTES
Encounter Date: 12/13/2024       History     Chief Complaint   Patient presents with    Wound Check     Pt lacerated his head at work on 12/11 and is on blood thinners. Pt was seen here on 12/11 (initial injury) and yesterday 12/12 due to him bleeding through his bandages. Pt has PMH A-Flutter and is on blood thinners. Pt denies any new s/s and Medic notes the wound to be bandaged and bleeding to be controlled in triages. Pt denies CP, SOB, dizziness, weakness or lightheadedness.      Chief complaint: Wound check    HPI:     [age] year male with history of hypertension diabetes psychiatric problem a flutter bipolar disorder on Eliquis presenting for wound check of laceration to the right eyebrow.  I repaired this laceration 2 days ago at this facility.  It appears per chart review that patient presented to [facility] emergency department yesterday for persisting bleeding from the laceration.  He is still taking his Eliquis as prescribed.  At that time they applied skin glue over the sutures.  He return to the ED today due to persisting bleeding from his laceration.  He states that when he awoke his right side of his face was covered in blood.  He states he is change the dressing 5 times in the past 2 days.  He denies any chest pain shortness breath dizziness lightheadedness headaches visual disturbance, nausea vomiting or other associated symptoms.  Denies worsening pain redness swelling purulent drainage    The history is provided by the patient.     Review of patient's allergies indicates:  No Known Allergies  Past Medical History:   Diagnosis Date    Anxiety disorder 07/03/2022    Atrial flutter     Bipolar 1 disorder 2011    Depression     Diabetes mellitus     Hx of psychiatric care     Hypertension     Psychiatric problem     Supraventricular tachycardia     Therapy      Past Surgical History:   Procedure Laterality Date    ABLATION N/A 09/17/2018    Procedure: ABLATION;  Surgeon: Demian Davis,  MD;  Location: [facility] CATH LAB;  Service: Cardiology;  Laterality: N/A;  SVT, RFA, CHELSIE, MAC, DM, 3 PREP    SLEEP APNEA MONITOR INSERTION       Family History   Problem Relation Name Age of Onset    Cancer Mother      Heart disease Father      Diabetes Father       Social History     Tobacco Use    Smoking status: Never    Smokeless tobacco: Never   Substance Use Topics    Alcohol use: No    Drug use: No     Review of Systems   Constitutional:  Negative for chills and fever.   HENT:  Negative for congestion, ear pain, rhinorrhea and sore throat.    Eyes:  Negative for redness.   Respiratory:  Negative for shortness of breath and stridor.    Cardiovascular:  Negative for chest pain.   Gastrointestinal:  Negative for abdominal pain, constipation, diarrhea, nausea and vomiting.   Genitourinary:  Negative for dysuria, frequency, hematuria and urgency.   Musculoskeletal:  Negative for back pain and neck pain.   Skin:  Positive for wound. Negative for rash.   Neurological:  Negative for dizziness, speech difficulty, weakness, light-headedness and numbness.   Hematological:  Does not bruise/bleed easily.   Psychiatric/Behavioral:  Negative for confusion.        Physical Exam     Initial Vitals [12/13/24 1707]   BP Pulse Resp Temp SpO2   (!) 107/59 86 18 98.1 °F (36.7 °C) 97 %      MAP       --         Physical Exam    Nursing note and vitals reviewed.  Constitutional: He appears well-developed and well-nourished. No distress.   HENT:   Head: Normocephalic.   Right Ear: External ear normal.   Left Ear: External ear normal.   Eyes: Conjunctivae are normal.   Pulmonary/Chest: No respiratory distress.   Musculoskeletal:         General: Normal range of motion.     Neurological: He is alert.   Skin: Skin is warm and dry. No rash noted.   Sutures in place to the right eyebrow with skin glue overlying the area.  No active bleeding.   Psychiatric: He has a normal mood and affect. His behavior is normal. Judgment and thought content  normal.         ED Course   Procedures  Labs Reviewed - No data to display       Imaging Results    None          Medications - No data to display  Medical Decision Making  [age] year male presenting for wound check.  Exam above.  No active bleeding from the wound.  Reassured patient.  He is asymptomatic and has not have any symptoms of anemia.  There is skin glue overlying the sutures that I placed at this facility 2 days ago.  Instructed to return to the ED in 3 days for suture removal.  Applying Neosporin to the area may help to remove the skin glue to make suture removal easier.  Will have patient return ER for worsening or as needed.                                      Clinical Impression:  Final diagnoses:  [Z51.89] Visit for wound check (Primary)          ED Disposition Condition    Discharge Stable          ED Prescriptions    None       Follow-up Information       Follow up With Specialties Details Why Contact Info    -[facility]  Schedule an appointment as soon as possible for a visit in 2 days for follow up [address] [address] [phone]      [facility] - Emergency Dept Emergency Medicine Go to  As needed, If symptoms worsen [address] [facility] [place] [address] [phone]             Karla Molina PA-C  12/13/24 0895

## 2024-12-20 ENCOUNTER — HOSPITAL ENCOUNTER (EMERGENCY)
Facility: HOSPITAL | Age: 39
Discharge: HOME | End: 2024-12-20
Attending: EMERGENCY MEDICINE
Payer: COMMERCIAL

## 2024-12-20 VITALS
TEMPERATURE: 98 F | HEART RATE: 82 BPM | BODY MASS INDEX: 31.89 KG/M2 | DIASTOLIC BLOOD PRESSURE: 86 MMHG | SYSTOLIC BLOOD PRESSURE: 131 MMHG | RESPIRATION RATE: 18 BRPM | WEIGHT: 262 LBS | OXYGEN SATURATION: 99 %

## 2024-12-20 DIAGNOSIS — Z48.02 VISIT FOR SUTURE REMOVAL: Primary | ICD-10-CM

## 2024-12-20 PROCEDURE — 99999 HC NO LEVEL OF SERVICE - ED ONLY: CPT

## 2024-12-20 RX ORDER — AMLODIPINE BESYLATE 5 MG/1
5 TABLET ORAL
COMMUNITY
Start: 2024-09-12

## 2024-12-20 RX ORDER — AMLODIPINE BESYLATE 10 MG/1
10 TABLET ORAL
COMMUNITY

## 2024-12-20 NOTE — ED PROVIDER NOTES
Encounter Date: 12/20/2024       History     Chief Complaint   Patient presents with    Suture / Staple Removal     Chief complaint: Suture removal    HPI:     39-year-old male with history of hypertension, diabetes, bipolar 1 disorder, anxiety, a flutter at on anticoagulants presenting for suture removal.  Sutures placed at this facility 9 days ago.  Presented 8 days ago and one-week ago for wound check due to persisting bleeding from the area.  Skin glue was applied by outside facility.  He denies any fever, chills, worsening pain redness swelling or drainage.    The history is provided by the patient.     Review of patient's allergies indicates:  No Known Allergies  Past Medical History:   Diagnosis Date    Anxiety disorder 07/03/2022    Atrial flutter     Bipolar 1 disorder 2011    Depression     Diabetes mellitus     Hx of psychiatric care     Hypertension     Psychiatric problem     Supraventricular tachycardia     Therapy      Past Surgical History:   Procedure Laterality Date    ABLATION N/A 09/17/2018    Procedure: ABLATION;  Surgeon: Demian Davis MD;  Location: [facility] CATH LAB;  Service: Cardiology;  Laterality: N/A;  SVT, RFA, CHELSIE, MAC, DM, 3 PREP    SLEEP APNEA MONITOR INSERTION       Family History   Problem Relation Name Age of Onset    Cancer Mother      Heart disease Father      Diabetes Father       Social History     Tobacco Use    Smoking status: Never    Smokeless tobacco: Never   Substance Use Topics    Alcohol use: No    Drug use: No     Review of Systems   Constitutional:  Negative for chills and fever.   HENT:  Negative for congestion, ear pain, rhinorrhea and sore throat.    Eyes:  Negative for redness.   Respiratory:  Negative for shortness of breath and stridor.    Cardiovascular:  Negative for chest pain.   Gastrointestinal:  Negative for abdominal pain, constipation, diarrhea, nausea and vomiting.   Genitourinary:  Negative for dysuria, frequency, hematuria and urgency.    Musculoskeletal:  Negative for back pain and neck pain.   Skin:  Positive for wound. Negative for rash.   Neurological:  Negative for dizziness, speech difficulty, weakness, light-headedness and numbness.   Hematological:  Does not bruise/bleed easily.   Psychiatric/Behavioral:  Negative for confusion.        Physical Exam     Initial Vitals [12/20/24 0649]   BP Pulse Resp Temp SpO2   131/86 82 18 97.9 °F (36.6 °C) 99 %      MAP       --         Physical Exam    Nursing note and vitals reviewed.  Constitutional: He appears well-developed and well-nourished. No distress.   HENT:   Head: Normocephalic.   Right Ear: External ear normal.   Left Ear: External ear normal.   Eyes: Conjunctivae are normal.   Pulmonary/Chest: No respiratory distress.   Musculoskeletal:         General: Normal range of motion.     Neurological: He is alert.   Skin: Skin is warm and dry. No rash noted.   Five sutures in place of the right eyebrow.  No surrounding erythema swelling or tenderness.  No drainage   Psychiatric: He has a normal mood and affect. His behavior is normal. Judgment and thought content normal.         ED Course   Suture Removal    Date/Time: 12/20/2024 7:16 AM  Location procedure was performed: [facility] EMERGENCY DEPARTMENT    Performed by: Karla Molina PA-C  Authorized by: Sarthak Carbajal MD  Pre-operative diagnosis: Right eyebrow laceration  Body area: head/neck  Location details: right eyebrow  Wound Appearance: well healed, no drainage, nontender and nonpurulent  Sutures Removed: 5  Staples Removed: 0  Post-removal: bandaid applied  Facility: sutures placed in this facility  Complications: No  Specimens: No  Implants: No  Patient tolerance: Patient tolerated the procedure well with no immediate complications        Labs Reviewed - No data to display       Imaging Results    None          Medications - No data to display  Medical Decision Making  39 year male presenting for suture removal.  Sutures placed at  this facility 9 days ago.  No evidence of infection or complications.  Removed per procedure note.  Follow up with primary care.  Return ER for worsening or as needed.                                      Clinical Impression:  Final diagnoses:  [Z48.02] Visit for suture removal (Primary)          ED Disposition Condition    Discharge Stable          ED Prescriptions    None       Follow-up Information       Follow up With Specialties Details Why Contact Info    -[facility]  Schedule an appointment as soon as possible for a visit in 2 days for follow up [address] [address]  [phone]      [facility] - Emergency Dept Emergency Medicine Go to  As needed, If symptoms worsen [address] [facility] [place] [address] [phone]             Karla Molina PA-C  12/20/24 0716

## 2024-12-20 NOTE — DISCHARGE INSTRUCTIONS

## 2025-03-31 ENCOUNTER — OFFICE VISIT (OUTPATIENT)
Dept: PSYCHIATRY | Facility: CLINIC | Age: 40
End: 2025-03-31
Payer: COMMERCIAL

## 2025-03-31 VITALS
BODY MASS INDEX: 29.82 KG/M2 | DIASTOLIC BLOOD PRESSURE: 78 MMHG | SYSTOLIC BLOOD PRESSURE: 114 MMHG | HEART RATE: 89 BPM | WEIGHT: 245 LBS

## 2025-03-31 DIAGNOSIS — F41.9 ANXIETY DISORDER, UNSPECIFIED TYPE: ICD-10-CM

## 2025-03-31 DIAGNOSIS — F31.9 BIPOLAR 1 DISORDER: Chronic | ICD-10-CM

## 2025-03-31 PROCEDURE — 99214 OFFICE O/P EST MOD 30 MIN: CPT | Mod: S$GLB,,, | Performed by: NURSE PRACTITIONER

## 2025-03-31 PROCEDURE — 99999 PR PBB SHADOW E&M-EST. PATIENT-LVL IV: CPT | Mod: PBBFAC,,, | Performed by: NURSE PRACTITIONER

## 2025-03-31 RX ORDER — DIVALPROEX SODIUM 500 MG/1
500 TABLET, FILM COATED, EXTENDED RELEASE ORAL 3 TIMES DAILY
Qty: 270 TABLET | Refills: 3 | Status: SHIPPED | OUTPATIENT
Start: 2025-03-31

## 2025-03-31 RX ORDER — FLUOXETINE HYDROCHLORIDE 40 MG/1
40 CAPSULE ORAL DAILY
Qty: 90 CAPSULE | Refills: 3 | Status: SHIPPED | OUTPATIENT
Start: 2025-03-31

## 2025-03-31 NOTE — PROGRESS NOTES
Outpatient Psychiatry Follow-Up Visit (MD/NP)    3/31/2025    Clinical Status of Patient:  Outpatient (Ambulatory)    Chief Complaint:  Vel Ordonez is a 39 y.o. male who presents today for follow-up of mood disorder.  Met with patient.      Last visit was: 6/29/22.  Chart and  reviewed.     Interval History and Content of Current Session:  Current Psychiatric Medications/changes  Labs: reviewed CMP, CBC, TSH, and Valproic Acid (69.7)  Depakote  mg 3 tabs daily  Prozac 20 mg daily  Trazodone 150 mg before bed as needed for insomnia      Reports he was fired on Friday at [employer] for getting into altercation with a coworkers.  Denies any SI/HI/AVH. Denies owning any firearms. Pt requesting an increase in medication. Father passed away 2 years ago. Denies any trouble sleeping. Will increase Prozac.        Labs: most recent results Valproic Acid (106.7)     Psychotherapy:  Target symptoms: mood swings, mood disorder  Why chosen therapy is appropriate versus another modality: relevant to diagnosis  Outcome monitoring methods: self-report  Therapeutic intervention type: insight oriented psychotherapy  Topics discussed/themes: building skills sets for symptom management, symptom recognition  The patient's response to the intervention is accepting. The patient's progress toward treatment goals is good.   Duration of intervention: 13 minutes.    Review of Systems   PSYCHIATRIC: Pertinant items are noted in the narrative.  CONSTITUTIONAL: No weight gain or loss.   MUSCULOSKELETAL: No pain or stiffness of the joints.  NEUROLOGIC: No weakness, sensory changes, seizures, confusion, memory loss, tremor or other abnormal movements.  ENDOCRINE: No polydipsia or polyuria.  INTEGUMENTARY: No rashes or lacerations.  EYES: No exophthalmos, jaundice or blindness.  ENT: No dizziness, tinnitus or hearing loss.  RESPIRATORY: No shortness of breath.  CARDIOVASCULAR: No tachycardia or chest pain.  GASTROINTESTINAL: No  nausea, vomiting, pain, constipation or diarrhea.  GENITOURINARY: No frequency, dysuria or sexual dysfunction.  HEMATOLOGIC/LYMPHATIC: No excessive bleeding, prolonged or excessive bleeding after dental extraction/injury.  ALLERGIC/IMMUNOLOGIC: No allergic response to materials, foods or animals at this time.    Past Medical, Family and Social History: The patient's past medical, family and social history have been reviewed and updated as appropriate within the electronic medical record - see encounter notes.    Compliance: yes    Side effects: None    Risk Parameters:  Patient reports no suicidal ideation  Patient reports no homicidal ideation  Patient reports no self-injurious behavior  Patient reports no violent behavior    Exam (detailed: at least 9 elements; comprehensive: all 15 elements)   Constitutional  Vitals:  Most recent vital signs, dated greater than 90 days prior to this appointment, were reviewed.   Vitals:    03/31/25 1445   BP: 114/78   Pulse: 89   Weight: 111.1 kg (245 lb)      General:  unremarkable, age appropriate     Musculoskeletal  Muscle Strength/Tone:  no tremor, no tic   Gait & Station:  non-ataxic     Psychiatric  Speech:  no latency; no press   Mood & Affect:  euthymic  congruent and appropriate   Thought Process:  normal and logical   Associations:  intact   Thought Content:  normal, no suicidality, no homicidality, delusions, or paranoia   Insight:  intact   Judgement: behavior is adequate to circumstances   Orientation:  grossly intact   Memory: intact for content of interview   Language: grossly intact   Attention Span & Concentration:  able to focus   Fund of Knowledge:  intact and appropriate to age and level of education     Assessment and Diagnosis   Status/Progress: Based on the examination today, the patient's problem(s) is/are adequately but not ideally controlled.  New problems have been presented today (insomnia).   Co-morbidities and Lack of compliance are not complicating  management of the primary condition.  There are no active rule-out diagnoses for this patient at this time.     General Impression:       ICD-10-CM ICD-9-CM   1. Anxiety disorder, unspecified type  F41.9 300.00   2. Bipolar 1 disorder  F31.9 296.7     Intervention/Counseling/Treatment Plan   Medication Management: Continue current medications. The risks and benefits of medication were discussed with the patient.  Labs: reviewed Valproic Acid (106.7)  Depakote  mg 3 tabs daily  Increase to Prozac 40 mg daily    Return to Clinic: 6 months     Risks, benefits, side effects and alternative treatments discussed with patient. Patient agrees with the current plan as documented.  Encouraged Patient to keep future appointments.  Take medications as prescribed and abstain from substance abuse.  Pt to present to ED for thoughts to harm himself or others

## 2025-05-02 ENCOUNTER — HOSPITAL ENCOUNTER (EMERGENCY)
Facility: HOSPITAL | Age: 40
Discharge: HOME | End: 2025-05-02
Attending: EMERGENCY MEDICINE
Payer: COMMERCIAL

## 2025-05-02 VITALS
TEMPERATURE: 98 F | DIASTOLIC BLOOD PRESSURE: 83 MMHG | BODY MASS INDEX: 30.44 KG/M2 | HEIGHT: 76 IN | SYSTOLIC BLOOD PRESSURE: 132 MMHG | HEART RATE: 69 BPM | WEIGHT: 250 LBS | OXYGEN SATURATION: 99 % | RESPIRATION RATE: 18 BRPM

## 2025-05-02 DIAGNOSIS — M79.672 LEFT FOOT PAIN: ICD-10-CM

## 2025-05-02 PROCEDURE — 99283 EMERGENCY DEPT VISIT LOW MDM: CPT | Mod: 25

## 2025-05-02 PROCEDURE — 25000003 PHARM REV CODE 250

## 2025-05-02 RX ORDER — IBUPROFEN 400 MG/1
800 TABLET ORAL
Status: COMPLETED | OUTPATIENT
Start: 2025-05-02 | End: 2025-05-02

## 2025-05-02 RX ORDER — MELOXICAM 15 MG/1
15 TABLET ORAL DAILY
Qty: 5 TABLET | Refills: 0 | Status: SHIPPED | OUTPATIENT
Start: 2025-05-02 | End: 2025-05-07

## 2025-05-02 RX ADMIN — IBUPROFEN 800 MG: 400 TABLET ORAL at 08:05

## 2025-05-02 NOTE — ED PROVIDER NOTES
Encounter Date: 5/2/2025       History     Chief Complaint   Patient presents with    Foot Pain     Pt presents w/ L foot pain x 1 day. Denies any trauma.     Patient is a 39-year-old male with a past medical history of iron anxiety, bipolar, depression, diabetes, hypertension who presents to the emergency department with complaints of left foot pain.  Patient states that his symptoms began approximately 1 day ago.  Patient does work as a  and states that he is on his feet very often throughout the day.  He admits to not wearing very supportive shoes.  He denies any known injury.  He denies fevers, chills, chest pain, shortness of breath, nausea, vomiting.        Review of patient's allergies indicates:  No Known Allergies  Past Medical History:   Diagnosis Date    Anxiety disorder 07/03/2022    Atrial flutter     Bipolar 1 disorder 2011    Depression     Diabetes mellitus     Hx of psychiatric care     Hypertension     Psychiatric problem     Supraventricular tachycardia     Therapy      Past Surgical History:   Procedure Laterality Date    ABLATION N/A 09/17/2018    Procedure: ABLATION;  Surgeon: Demian Davis MD;  Location: [facility] CATH LAB;  Service: Cardiology;  Laterality: N/A;  SVT, RFA, CHELSIE, MAC, DM, 3 PREP    SLEEP APNEA MONITOR INSERTION       Family History   Problem Relation Name Age of Onset    Cancer Mother      Heart disease Father      Diabetes Father       Social History[1]  Review of Systems   Constitutional:  Negative for chills and fever.   Respiratory:  Negative for chest tightness and shortness of breath.    Cardiovascular:  Negative for chest pain and leg swelling.   Gastrointestinal:  Negative for abdominal pain and nausea.   Musculoskeletal:  Positive for arthralgias.   Neurological:  Negative for dizziness and weakness.       Physical Exam     Initial Vitals [05/02/25 0609]   BP Pulse Resp Temp SpO2   132/83 69 18 97.8 °F (36.6 °C) 99 %      MAP       --         Physical  Exam    Nursing note and vitals reviewed.  Constitutional: He appears well-developed and well-nourished. He is not diaphoretic. He does not appear ill. No distress.   HENT:   Head: Normocephalic and atraumatic.   Right Ear: External ear normal.   Left Ear: External ear normal.   Nose: Nose normal. Mouth/Throat: Uvula is midline and oropharynx is clear and moist.   Eyes: Conjunctivae and EOM are normal. Pupils are equal, round, and reactive to light. Right eye exhibits no discharge. Left eye exhibits no discharge. No scleral icterus.   Neck: Trachea normal.   Normal range of motion.   Full passive range of motion without pain.     Cardiovascular:  Normal rate and normal pulses.     Exam reveals no distant heart sounds.       Pulmonary/Chest: Effort normal. No respiratory distress.   Abdominal: Abdomen is soft. Bowel sounds are normal. He exhibits no distension and no pulsatile midline mass. There is no abdominal tenderness.   No right CVA tenderness.  No left CVA tenderness. There is no rebound and no guarding.   Musculoskeletal:         General: Normal range of motion.      Cervical back: Full passive range of motion without pain and normal range of motion.      Comments: Mild tenderness to the left lateral foot   Normal distal pulses   Neurovascularly intact  Patient ambulates without difficulty        Neurological: He is alert and oriented to person, place, and time. He has normal strength. No cranial nerve deficit or sensory deficit. Coordination and gait normal.   Skin: Skin is warm and dry. Capillary refill takes less than 2 seconds. No bruising, no ecchymosis and no rash noted. No erythema.   Psychiatric: He has a normal mood and affect. His speech is normal and behavior is normal. Thought content normal.         ED Course   Procedures  Labs Reviewed - No data to display       Imaging Results              X-Ray Foot Complete Left (Final result)  Result time 05/02/25 07:59:46      Final result by Timmy Mehta  MD ALEJANDRO (05/02/25 07:59:46)                   Impression:      No evidence for acute fracture, bone destruction, or dislocation.    Mild degenerative changes.    Calcaneal spur at the insertion of the Achilles tendon.      Electronically signed by: Timmy Mehta MD  Date:    05/02/2025  Time:    07:59               Narrative:    EXAMINATION:  XR FOOT COMPLETE 3 VIEW LEFT    CLINICAL HISTORY:  .  Pain in left foot    TECHNIQUE:  AP, lateral and oblique views of the left foot were performed.    COMPARISON:  None.    FINDINGS:  The bones are intact. There is no evidence for acute fracture or bone destruction. There is no evidence for dislocation. There are mild degenerative changes within the small joints of the foot.  There is a calcaneal spur at the insertion of the Achilles tendon. Soft tissues are unremarkable.                                       Medications   ibuprofen tablet 800 mg (800 mg Oral Given 5/2/25 0813)     Medical Decision Making  Patient is a 39-year-old male with a past medical history of iron anxiety, bipolar, depression, diabetes, hypertension who presents to the emergency department with complaints of left foot pain.  Patient states that his symptoms began approximately 1 day ago.  Patient does work as a  and states that he is on his feet very often throughout the day.  He admits to not wearing very supportive shoes.  He denies any known injury.  He denies fevers, chills, chest pain, shortness of breath, nausea, vomiting.    Differentials include but are not limited to Fracture, dislocation, compartment syndrome, nerve injury/palsy, vascular injury, DVT, rhabdomyolysis, hemarthrosis, septic joint, cellulitis, bursitis, muscle strain, ligament tear/sprain, laceration, foreign body, abrasion, soft tissue contusion, osteoarthritis, osteomyelitis.    No fracture or dislocation appreciated on x-ray.  Discussed with the patient to wear more supportive shoes throughout the day.  We will  prescribe a short course of anti-inflammatories.  All questions and concerns addressed prior to discharge. I discussed with the patient the diagnosis, treatment plan, indications for return to the emergency department, and for expected follow-up. The patient verbalized an understanding. The patient is asked if there are any questions or concerns. We discuss the case, until all issues are addressed to the patient's satisfaction. Patient understands and is agreeable to the plan.     Amount and/or Complexity of Data Reviewed  Radiology: ordered.    Risk  Prescription drug management.                                      Clinical Impression:  Final diagnoses:  [M79.672] Left foot pain          ED Disposition Condition    Discharge Stable          ED Prescriptions       Medication Sig Dispense Start Date End Date Auth. Provider    meloxicam (MOBIC) 15 MG tablet Take 1 tablet (15 mg total) by mouth once daily. for 5 days 5 tablet 5/2/2025 5/7/2025 Debora Joe PA-C          Follow-up Information       Follow up With Specialties Details Why Contact Info    -[address] [address] [phone]      [facility] Emergency Dept Emergency Medicine Go to  for new or worsening symptoms [address] [facility] [place] [address] [phone]               [1]   Social History  Tobacco Use    Smoking status: Never    Smokeless tobacco: Never   Substance Use Topics    Alcohol use: No    Drug use: No        Debora Joe PA-C  05/02/25 4017

## 2025-05-15 ENCOUNTER — PATIENT MESSAGE (OUTPATIENT)
Dept: PSYCHIATRY | Facility: CLINIC | Age: 40
End: 2025-05-15
Payer: COMMERCIAL

## 2025-06-03 ENCOUNTER — OFFICE VISIT (OUTPATIENT)
Dept: PRIMARY CARE CLINIC | Facility: CLINIC | Age: 40
End: 2025-06-03
Payer: COMMERCIAL

## 2025-06-03 ENCOUNTER — LAB VISIT (OUTPATIENT)
Dept: LAB | Facility: HOSPITAL | Age: 40
End: 2025-06-03
Attending: FAMILY MEDICINE
Payer: COMMERCIAL

## 2025-06-03 VITALS
WEIGHT: 257.5 LBS | BODY MASS INDEX: 31.36 KG/M2 | HEIGHT: 76 IN | OXYGEN SATURATION: 98 % | HEART RATE: 83 BPM | DIASTOLIC BLOOD PRESSURE: 74 MMHG | SYSTOLIC BLOOD PRESSURE: 132 MMHG

## 2025-06-03 DIAGNOSIS — R55 PRE-SYNCOPE: ICD-10-CM

## 2025-06-03 DIAGNOSIS — I10 ESSENTIAL HYPERTENSION: ICD-10-CM

## 2025-06-03 DIAGNOSIS — Z86.79 HISTORY OF SUPRAVENTRICULAR TACHYCARDIA: ICD-10-CM

## 2025-06-03 DIAGNOSIS — Z86.79 S/P ABLATION OPERATION FOR ARRHYTHMIA: ICD-10-CM

## 2025-06-03 DIAGNOSIS — M79.672 LEFT FOOT PAIN: ICD-10-CM

## 2025-06-03 DIAGNOSIS — Z98.890 S/P ABLATION OPERATION FOR ARRHYTHMIA: ICD-10-CM

## 2025-06-03 DIAGNOSIS — Z96.82 S/P PLACEMENT OF HYPOGLOSSAL NERVE STIMULATOR: ICD-10-CM

## 2025-06-03 DIAGNOSIS — E66.811 CLASS 1 OBESITY DUE TO EXCESS CALORIES WITH SERIOUS COMORBIDITY AND BODY MASS INDEX (BMI) OF 31.0 TO 31.9 IN ADULT: ICD-10-CM

## 2025-06-03 DIAGNOSIS — E11.9 TYPE 2 DIABETES MELLITUS WITHOUT COMPLICATION, WITHOUT LONG-TERM CURRENT USE OF INSULIN: ICD-10-CM

## 2025-06-03 DIAGNOSIS — I48.0 PAROXYSMAL A-FIB: ICD-10-CM

## 2025-06-03 DIAGNOSIS — E66.09 CLASS 1 OBESITY DUE TO EXCESS CALORIES WITH SERIOUS COMORBIDITY AND BODY MASS INDEX (BMI) OF 31.0 TO 31.9 IN ADULT: ICD-10-CM

## 2025-06-03 DIAGNOSIS — R20.2 PINS AND NEEDLES SENSATION: ICD-10-CM

## 2025-06-03 DIAGNOSIS — E78.6 LOW HDL (UNDER 40): ICD-10-CM

## 2025-06-03 DIAGNOSIS — F31.9 BIPOLAR 1 DISORDER: ICD-10-CM

## 2025-06-03 DIAGNOSIS — R13.10 DYSPHAGIA, UNSPECIFIED TYPE: ICD-10-CM

## 2025-06-03 DIAGNOSIS — Z00.00 ANNUAL PHYSICAL EXAM: Primary | ICD-10-CM

## 2025-06-03 LAB
25(OH)D3+25(OH)D2 SERPL-MCNC: 26 NG/ML (ref 30–96)
ABSOLUTE EOSINOPHIL (OHS): 0.2 K/UL
ABSOLUTE MONOCYTE (OHS): 1.32 K/UL (ref 0.3–1)
ABSOLUTE NEUTROPHIL COUNT (OHS): 7.21 K/UL (ref 1.8–7.7)
ALBUMIN SERPL BCP-MCNC: 4.2 G/DL (ref 3.5–5.2)
ALP SERPL-CCNC: 73 UNIT/L (ref 40–150)
ALT SERPL W/O P-5'-P-CCNC: 21 UNIT/L (ref 10–44)
ANION GAP (OHS): 12 MMOL/L (ref 8–16)
AST SERPL-CCNC: 22 UNIT/L (ref 11–45)
BASOPHILS # BLD AUTO: 0.07 K/UL
BASOPHILS NFR BLD AUTO: 0.6 %
BILIRUB SERPL-MCNC: 0.2 MG/DL (ref 0.1–1)
BUN SERPL-MCNC: 15 MG/DL (ref 6–20)
CALCIUM SERPL-MCNC: 9.3 MG/DL (ref 8.7–10.5)
CHLORIDE SERPL-SCNC: 105 MMOL/L (ref 95–110)
CHOLEST SERPL-MCNC: 166 MG/DL (ref 120–199)
CHOLEST/HDLC SERPL: 4.9 {RATIO} (ref 2–5)
CO2 SERPL-SCNC: 23 MMOL/L (ref 23–29)
CREAT SERPL-MCNC: 0.9 MG/DL (ref 0.5–1.4)
EAG (OHS): 120 MG/DL (ref 68–131)
ERYTHROCYTE [DISTWIDTH] IN BLOOD BY AUTOMATED COUNT: 13.7 % (ref 11.5–14.5)
FERRITIN SERPL-MCNC: 147 NG/ML (ref 20–300)
GFR SERPLBLD CREATININE-BSD FMLA CKD-EPI: >60 ML/MIN/1.73/M2
GLUCOSE SERPL-MCNC: 80 MG/DL (ref 70–110)
HBA1C MFR BLD: 5.8 % (ref 4–5.6)
HCT VFR BLD AUTO: 44.6 % (ref 40–54)
HDLC SERPL-MCNC: 34 MG/DL (ref 40–75)
HDLC SERPL: 20.5 % (ref 20–50)
HGB BLD-MCNC: 14.1 GM/DL (ref 14–18)
IMM GRANULOCYTES # BLD AUTO: 0.05 K/UL (ref 0–0.04)
IMM GRANULOCYTES NFR BLD AUTO: 0.4 % (ref 0–0.5)
IRON SATN MFR SERPL: 22 % (ref 20–50)
IRON SERPL-MCNC: 76 UG/DL (ref 45–160)
LDLC SERPL CALC-MCNC: 104.2 MG/DL (ref 63–159)
LYMPHOCYTES # BLD AUTO: 3.69 K/UL (ref 1–4.8)
MCH RBC QN AUTO: 29.5 PG (ref 27–31)
MCHC RBC AUTO-ENTMCNC: 31.6 G/DL (ref 32–36)
MCV RBC AUTO: 93 FL (ref 82–98)
NONHDLC SERPL-MCNC: 132 MG/DL
NUCLEATED RBC (/100WBC) (OHS): 0 /100 WBC
PLATELET # BLD AUTO: 366 K/UL (ref 150–450)
PMV BLD AUTO: 10.3 FL (ref 9.2–12.9)
POTASSIUM SERPL-SCNC: 4.4 MMOL/L (ref 3.5–5.1)
PROT SERPL-MCNC: 7.7 GM/DL (ref 6–8.4)
RBC # BLD AUTO: 4.78 M/UL (ref 4.6–6.2)
RELATIVE EOSINOPHIL (OHS): 1.6 %
RELATIVE LYMPHOCYTE (OHS): 29.4 % (ref 18–48)
RELATIVE MONOCYTE (OHS): 10.5 % (ref 4–15)
RELATIVE NEUTROPHIL (OHS): 57.5 % (ref 38–73)
SODIUM SERPL-SCNC: 140 MMOL/L (ref 136–145)
TIBC SERPL-MCNC: 351 UG/DL (ref 250–450)
TRANSFERRIN SERPL-MCNC: 237 MG/DL (ref 200–375)
TRIGL SERPL-MCNC: 139 MG/DL (ref 30–150)
TSH SERPL-ACNC: 1.92 UIU/ML (ref 0.4–4)
VALPROATE SERPL-MCNC: 72.9 UG/ML (ref 50–100)
VIT B12 SERPL-MCNC: 283 PG/ML (ref 210–950)
WBC # BLD AUTO: 12.54 K/UL (ref 3.9–12.7)

## 2025-06-03 PROCEDURE — 82728 ASSAY OF FERRITIN: CPT

## 2025-06-03 PROCEDURE — 80053 COMPREHEN METABOLIC PANEL: CPT

## 2025-06-03 PROCEDURE — 99999 PR PBB SHADOW E&M-EST. PATIENT-LVL V: CPT | Mod: PBBFAC,,, | Performed by: FAMILY MEDICINE

## 2025-06-03 PROCEDURE — 83036 HEMOGLOBIN GLYCOSYLATED A1C: CPT

## 2025-06-03 PROCEDURE — 99385 PREV VISIT NEW AGE 18-39: CPT | Mod: S$GLB,,, | Performed by: FAMILY MEDICINE

## 2025-06-03 PROCEDURE — 36415 COLL VENOUS BLD VENIPUNCTURE: CPT | Mod: PN

## 2025-06-03 PROCEDURE — 80164 ASSAY DIPROPYLACETIC ACD TOT: CPT

## 2025-06-03 PROCEDURE — 84466 ASSAY OF TRANSFERRIN: CPT

## 2025-06-03 PROCEDURE — 80061 LIPID PANEL: CPT

## 2025-06-03 PROCEDURE — 82607 VITAMIN B-12: CPT

## 2025-06-03 PROCEDURE — 85025 COMPLETE CBC W/AUTO DIFF WBC: CPT

## 2025-06-03 PROCEDURE — 84443 ASSAY THYROID STIM HORMONE: CPT

## 2025-06-03 PROCEDURE — 82306 VITAMIN D 25 HYDROXY: CPT

## 2025-06-03 RX ORDER — METOPROLOL TARTRATE 25 MG/1
1 TABLET, FILM COATED ORAL 2 TIMES DAILY
COMMUNITY
Start: 2025-01-18

## 2025-06-03 RX ORDER — SEMAGLUTIDE 1.34 MG/ML
1 INJECTION, SOLUTION SUBCUTANEOUS
Qty: 3 ML | Refills: 3 | Status: SHIPPED | OUTPATIENT
Start: 2025-06-03

## 2025-06-03 RX ORDER — CYCLOBENZAPRINE HCL 5 MG
5 TABLET ORAL 3 TIMES DAILY
COMMUNITY
Start: 2024-12-30 | End: 2025-06-03

## 2025-06-03 RX ORDER — TERBINAFINE HYDROCHLORIDE 250 MG/1
1 TABLET ORAL DAILY
COMMUNITY
Start: 2024-09-18 | End: 2025-06-03

## 2025-06-03 RX ORDER — PROCHLORPERAZINE MALEATE 5 MG
5 TABLET ORAL EVERY 8 HOURS PRN
COMMUNITY
Start: 2024-10-06 | End: 2025-06-03

## 2025-06-03 RX ORDER — BLOOD-GLUCOSE SENSOR
1 EACH MISCELLANEOUS DAILY
Qty: 5 EACH | Refills: 11 | Status: SHIPPED | OUTPATIENT
Start: 2025-06-03 | End: 2026-06-03

## 2025-06-03 RX ORDER — SEMAGLUTIDE 1.34 MG/ML
1 INJECTION, SOLUTION SUBCUTANEOUS
COMMUNITY
End: 2025-06-03 | Stop reason: SDUPTHER

## 2025-06-03 RX ORDER — FAMOTIDINE 20 MG/1
20 TABLET, FILM COATED ORAL 2 TIMES DAILY
COMMUNITY
Start: 2025-05-06 | End: 2025-06-03

## 2025-06-03 RX ORDER — ATORVASTATIN CALCIUM 80 MG/1
80 TABLET, FILM COATED ORAL DAILY
Qty: 90 TABLET | Refills: 3 | Status: SHIPPED | OUTPATIENT
Start: 2025-06-03 | End: 2026-06-03

## 2025-06-03 RX ORDER — FLECAINIDE ACETATE 50 MG/1
1 TABLET ORAL 2 TIMES DAILY
COMMUNITY
Start: 2025-01-18

## 2025-06-03 RX ORDER — LISINOPRIL 40 MG/1
40 TABLET ORAL DAILY
COMMUNITY
Start: 2024-12-29

## 2025-06-04 ENCOUNTER — RESULTS FOLLOW-UP (OUTPATIENT)
Dept: PRIMARY CARE CLINIC | Facility: CLINIC | Age: 40
End: 2025-06-04

## 2025-06-06 ENCOUNTER — CLINICAL SUPPORT (OUTPATIENT)
Dept: CARDIOLOGY | Facility: HOSPITAL | Age: 40
End: 2025-06-06
Attending: FAMILY MEDICINE
Payer: COMMERCIAL

## 2025-06-06 DIAGNOSIS — R55 PRE-SYNCOPE: ICD-10-CM

## 2025-06-06 DIAGNOSIS — Z98.890 S/P ABLATION OPERATION FOR ARRHYTHMIA: ICD-10-CM

## 2025-06-06 DIAGNOSIS — Z86.79 S/P ABLATION OPERATION FOR ARRHYTHMIA: ICD-10-CM

## 2025-06-06 DIAGNOSIS — I48.0 PAROXYSMAL A-FIB: ICD-10-CM

## 2025-06-06 PROCEDURE — 93271 ECG/MONITORING AND ANALYSIS: CPT

## 2025-06-11 ENCOUNTER — HOSPITAL ENCOUNTER (OUTPATIENT)
Dept: CARDIOLOGY | Facility: HOSPITAL | Age: 40
Discharge: HOME | End: 2025-06-11
Attending: FAMILY MEDICINE
Payer: COMMERCIAL

## 2025-06-11 ENCOUNTER — TELEPHONE (OUTPATIENT)
Dept: PRIMARY CARE CLINIC | Facility: CLINIC | Age: 40
End: 2025-06-11

## 2025-06-11 ENCOUNTER — CLINICAL SUPPORT (OUTPATIENT)
Dept: PRIMARY CARE CLINIC | Facility: CLINIC | Age: 40
End: 2025-06-11
Payer: COMMERCIAL

## 2025-06-11 ENCOUNTER — OFFICE VISIT (OUTPATIENT)
Dept: GASTROENTEROLOGY | Facility: CLINIC | Age: 40
End: 2025-06-11
Payer: COMMERCIAL

## 2025-06-11 VITALS
HEART RATE: 60 BPM | BODY MASS INDEX: 31.29 KG/M2 | SYSTOLIC BLOOD PRESSURE: 108 MMHG | HEIGHT: 76 IN | DIASTOLIC BLOOD PRESSURE: 74 MMHG | WEIGHT: 257 LBS

## 2025-06-11 VITALS — DIASTOLIC BLOOD PRESSURE: 76 MMHG | HEART RATE: 63 BPM | SYSTOLIC BLOOD PRESSURE: 120 MMHG

## 2025-06-11 DIAGNOSIS — I48.0 PAROXYSMAL A-FIB: ICD-10-CM

## 2025-06-11 DIAGNOSIS — K21.9 GASTROESOPHAGEAL REFLUX DISEASE, UNSPECIFIED WHETHER ESOPHAGITIS PRESENT: ICD-10-CM

## 2025-06-11 DIAGNOSIS — Z86.79 S/P ABLATION OPERATION FOR ARRHYTHMIA: ICD-10-CM

## 2025-06-11 DIAGNOSIS — Z98.890 S/P ABLATION OPERATION FOR ARRHYTHMIA: ICD-10-CM

## 2025-06-11 DIAGNOSIS — R55 PRE-SYNCOPE: ICD-10-CM

## 2025-06-11 DIAGNOSIS — E11.9 TYPE 2 DIABETES MELLITUS WITHOUT COMPLICATION, WITHOUT LONG-TERM CURRENT USE OF INSULIN: ICD-10-CM

## 2025-06-11 DIAGNOSIS — R13.10 DYSPHAGIA, UNSPECIFIED TYPE: Primary | ICD-10-CM

## 2025-06-11 LAB
AORTIC SIZE INDEX (SOV): 1.3 CM/M2
AORTIC SIZE INDEX: 1.2 CM/M2
ASCENDING AORTA: 2.9 CM
AV AREA BY CONTINUOUS VTI: 3.7 CM2
AV INDEX (PROSTH): 0.78
AV LVOT MEAN GRADIENT: 2 MMHG
AV LVOT PEAK GRADIENT: 4 MMHG
AV MEAN GRADIENT: 3 MMHG
AV PEAK GRADIENT: 6 MMHG
AV VALVE AREA BY VELOCITY RATIO: 4.1 CM²
AV VALVE AREA: 3.8 CM2
AV VELOCITY RATIO: 0.83
BSA FOR ECHO PROCEDURE: 2.5 M2
CV ECHO LV RWT: 0.28 CM
DOP CALC AO PEAK VEL: 1.2 M/S
DOP CALC AO VTI: 27 CM
DOP CALC LVOT AREA: 4.9 CM2
DOP CALC LVOT DIAMETER: 2.5 CM
DOP CALC LVOT PEAK VEL: 1 M/S
DOP CALC LVOT STROKE VOLUME: 103 CM3
DOP CALCLVOT PEAK VEL VTI: 21 CM
E WAVE DECELERATION TIME: 221 MS
E/A RATIO: 1.49
E/E' RATIO: 8 M/S
ECHO EF ESTIMATED: 58 %
ECHO LV POSTERIOR WALL: 0.8 CM (ref 0.6–1.1)
FRACTIONAL SHORTENING: 31.6 % (ref 28–44)
GLOBAL LONGITUIDAL STRAIN: 17 %
INTERVENTRICULAR SEPTUM: 0.7 CM (ref 0.6–1.1)
IVC DIAMETER: 1.48 CM
LA MAJOR: 5.3 CM
LA MINOR: 5.4 CM
LA WIDTH: 4.1 CM
LEFT ATRIUM SIZE: 4.5 CM
LEFT ATRIUM VOLUME INDEX MOD: 26 ML/M2
LEFT ATRIUM VOLUME INDEX: 34 ML/M2
LEFT ATRIUM VOLUME MOD: 63 ML
LEFT ATRIUM VOLUME: 84 CM3
LEFT INTERNAL DIMENSION IN SYSTOLE: 3.9 CM (ref 2.1–4)
LEFT VENTRICLE DIASTOLIC VOLUME INDEX: 64.63 ML/M2
LEFT VENTRICLE DIASTOLIC VOLUME: 159 ML
LEFT VENTRICLE MASS INDEX: 63.8 G/M2
LEFT VENTRICLE SYSTOLIC VOLUME INDEX: 26.8 ML/M2
LEFT VENTRICLE SYSTOLIC VOLUME: 66 ML
LEFT VENTRICULAR INTERNAL DIMENSION IN DIASTOLE: 5.7 CM (ref 3.5–6)
LEFT VENTRICULAR MASS: 157.1 G
LV LATERAL E/E' RATIO: 7.1
LV SEPTAL E/E' RATIO: 8
MV PEAK A VEL: 0.43 M/S
MV PEAK E VEL: 0.64 M/S
OHS CV RV/LV RATIO: 0.65 CM
OHS LV EJECTION FRACTION SIMPSONS BIPLANE MOD: 48 %
PISA TR MAX VEL: 1.8 M/S
RA MAJOR: 4.69 CM
RA PRESSURE ESTIMATED: 8 MMHG
RA WIDTH: 3.28 CM
RIGHT ATRIAL AREA: 13.3 CM2
RIGHT VENTRICLE DIASTOLIC BASEL DIMENSION: 3.7 CM
RV TB RVSP: 10 MMHG
RV TISSUE DOPPLER FREE WALL SYSTOLIC VELOCITY 1 (APICAL 4 CHAMBER VIEW): 12.98 CM/S
SINUS: 3.15 CM
STJ: 2.6 CM
TDI LATERAL: 0.09 M/S
TDI SEPTAL: 0.08 M/S
TDI: 0.09 M/S
TRICUSPID ANNULAR PLANE SYSTOLIC EXCURSION: 1.8 CM
TV PEAK GRADIENT: 14 MMHG
TV REST PULMONARY ARTERY PRESSURE: 21 MMHG
Z-SCORE OF LEFT VENTRICULAR DIMENSION IN END DIASTOLE: -7.92
Z-SCORE OF LEFT VENTRICULAR DIMENSION IN END SYSTOLE: -5.16

## 2025-06-11 PROCEDURE — 93306 TTE W/DOPPLER COMPLETE: CPT | Mod: 26,,, | Performed by: INTERNAL MEDICINE

## 2025-06-11 PROCEDURE — 93356 MYOCRD STRAIN IMG SPCKL TRCK: CPT | Mod: ,,, | Performed by: INTERNAL MEDICINE

## 2025-06-11 PROCEDURE — 99999 PR PBB SHADOW E&M-EST. PATIENT-LVL II: CPT | Mod: PBBFAC,,,

## 2025-06-11 PROCEDURE — 93356 MYOCRD STRAIN IMG SPCKL TRCK: CPT

## 2025-06-11 PROCEDURE — 99999 PR PBB SHADOW E&M-EST. PATIENT-LVL I: CPT | Mod: PBBFAC,,, | Performed by: STUDENT IN AN ORGANIZED HEALTH CARE EDUCATION/TRAINING PROGRAM

## 2025-06-11 PROCEDURE — 99204 OFFICE O/P NEW MOD 45 MIN: CPT | Mod: S$GLB,,, | Performed by: STUDENT IN AN ORGANIZED HEALTH CARE EDUCATION/TRAINING PROGRAM

## 2025-06-11 RX ORDER — SEMAGLUTIDE 1.34 MG/ML
1 INJECTION, SOLUTION SUBCUTANEOUS
Qty: 3 ML | Refills: 3 | Status: CANCELLED | OUTPATIENT
Start: 2025-06-11

## 2025-06-11 RX ORDER — PANTOPRAZOLE SODIUM 40 MG/1
40 TABLET, DELAYED RELEASE ORAL DAILY
Qty: 90 TABLET | Refills: 3 | Status: SHIPPED | OUTPATIENT
Start: 2025-06-11 | End: 2026-06-11

## 2025-06-11 RX ORDER — ATORVASTATIN CALCIUM 80 MG/1
80 TABLET, FILM COATED ORAL DAILY
Qty: 90 TABLET | Refills: 3 | Status: CANCELLED | OUTPATIENT
Start: 2025-06-11 | End: 2026-06-11

## 2025-06-11 RX ORDER — BLOOD-GLUCOSE SENSOR
1 EACH MISCELLANEOUS DAILY
Qty: 5 EACH | Refills: 11 | Status: CANCELLED | OUTPATIENT
Start: 2025-06-11 | End: 2026-06-11

## 2025-06-11 NOTE — PROGRESS NOTES
Vel Ordonez 39 y.o. male is here for Blood Pressure check. in person    Manual Blood pressure reading was 120/76, Pulse 63. (Checked at the end of the visit)    Pt's Home blood pressure machine read in office 119/83, Pulse 57.     Diagnosed with Hypertension yes.    Patient took blood pressure medication today yes.  Last dose of blood pressure medication was taken at 6:00am. Patient took amLODIPine (NORVASC) 10, lisinopriL (PRINIVIL,ZESTRIL) 40 & metoprolol tartrate (LOPRESSOR) 25.     All Medications and OTC medication updated yes    Does patient have record of home blood pressure readings / Blood Pressure Log yes. Pt states he takes his medication around 6:00-6:15am.     Does the pt have any complaints today in regards to their blood pressure medication? No Complains of Pt does not have any complaints, he feels like the medication is working. Patient is asymptomatic.     Were you sitting still for 5-10 minutes prior to taking your Blood pressure? yes     Has your blood pressure monitor ever been checked? yes When was last time we checked your blood pressure monitor? 06/11/2025    Updated vitals yes    Follow up date is 07/29/2025.     Dr. Ceja notified.     *Pt needs pended medications sent to [email or web address], pt states he does not use [facility].*  Creatinine   Date Value Ref Range Status   06/03/2025 0.9 0.5 - 1.4 mg/dL Final     Sodium   Date Value Ref Range Status   06/03/2025 140 136 - 145 mmol/L Final   03/12/2025 137 135 - 146 mmol/L Final   03/14/2023 142 136 - 145 mmol/L Final     Potassium   Date Value Ref Range Status   06/03/2025 4.4 3.5 - 5.1 mmol/L Final   03/12/2025 3.8 3.6 - 5.2 mmol/L Final   03/14/2023 4.3 3.5 - 5.1 mmol/L Final

## 2025-06-11 NOTE — PROGRESS NOTES
[facility] NOTE    Name: Vel Ordonez  : 1985  Date of Service: 2025   PCP: Opal Ceja MD    Reason for visit: Dysphagia     HPI:     The patient is a 38 y/o M with HTN, T2DM, A/D, Bipolar I disorder, afib on AC and history of ROJELIO s/p hypoglossal nerve stimulator ( at ) that presents to GI clinic for evaluation of dysphagia. Referred to us by his PCP. Reports 3 months of intermittent solid food dysphagia. No trouble with liquids. Finds that foods like sausage and grapes will give him trouble, but otherwise eating fine. Weight loss of 50 lbs reported, but this is intentional 2/2 Ozempic. He is on a blood thinner for a fib. He reports he has been having GERD for some time. He recognizes his food triggers including pizza. He estimates that he will have reflux 4 nights a week. He typically has nocturnal burning/pyrosis. He has tried Pepcid, but this did not work for him. He does not smoke or drink any alcohol. He has never had an EGD. He has never been on a PPI. He denies any family history of gastrointestinal cancers including stomach, esophagus and colon.     Review of Systems:   Review of Systems   Constitutional:  Negative for chills and fever.   Gastrointestinal:  Negative for abdominal pain, constipation, diarrhea, nausea and vomiting.     Medications: Current Medications[1]     Past medical history, past surgical history, family history, allergies, and social history reviewed and updated in EMR.     Vitals:   There were no vitals filed for this visit.  There is no height or weight on file to calculate BMI.    Physical Exam:   Physical Exam  Vitals reviewed.   HENT:      Head: Normocephalic and atraumatic.      Nose: Nose normal.   Eyes:      General: No scleral icterus.  Cardiovascular:      Rate and Rhythm: Normal rate.      Pulses: Normal pulses.   Pulmonary:      Effort: Pulmonary effort is normal. No respiratory distress.   Abdominal:      General:  Abdomen is flat. There is no distension.      Palpations: Abdomen is soft.      Tenderness: There is no abdominal tenderness. There is no guarding.   Skin:     General: Skin is warm and dry.      Coloration: Skin is not jaundiced.   Neurological:      Mental Status: He is alert and oriented to person, place, and time. Mental status is at baseline.   Psychiatric:         Mood and Affect: Mood normal.         Behavior: Behavior normal.       Assessment:   1. Dysphagia, unspecified type  Ambulatory referral/consult to Gastroenterology      2. Gastroesophageal reflux disease, unspecified whether esophagitis present          Plan:   - Will plan for EGD to further evaluate dysphagia. Hold Eliquis for 48 hours prior to EGD and hold Ozempic for 1 week   - Start pantoprazole 40 mg QD; counseled patient to take PPI 45- 60 minutes before first protein meal of the day   - Counseled to avoid triggers for reflux such as red sauce, citrus, coffee and carbonated beverages     Discussed with staff attending. Attestation to follow.     Bernard White DO PGY- V  Gastroenterology Fellow  [facility]       [1]   Current Outpatient Medications:     amLODIPine (NORVASC) 10 MG tablet, Take 10 mg by mouth., Disp: , Rfl:     apixaban (ELIQUIS) 5 mg Tab, Take 1 tablet (5 mg total) by mouth 2 (two) times daily., Disp: 180 tablet, Rfl: 1    atorvastatin (LIPITOR) 80 MG tablet, Take 1 tablet (80 mg total) by mouth once daily., Disp: 90 tablet, Rfl: 3    blood sugar diagnostic Strp, 1 strip by Other route., Disp: , Rfl:     blood-glucose sensor (FREESTYLE KANG 3 SENSOR) Martha, 1 Device by Misc.(Non-Drug; Combo Route) route Daily., Disp: 5 each, Rfl: 11    diazePAM (VALIUM) 5 MG tablet, Take 1 tablet (5 mg total) by mouth daily as needed for Anxiety., Disp: 30 tablet, Rfl: 0    divalproex ER (DEPAKOTE ER) 500 MG Tb24 24 hr tablet, Take 1 tablet (500 mg total) by mouth 3 (three) times daily., Disp: 270 tablet, Rfl: 3    flecainide  (TAMBOCOR) 50 MG Tab, Take 1 tablet by mouth 2 (two) times daily., Disp: , Rfl:     FLUoxetine 40 MG capsule, Take 1 capsule (40 mg total) by mouth once daily., Disp: 90 capsule, Rfl: 3    lisinopriL (PRINIVIL,ZESTRIL) 40 MG tablet, Take 40 mg by mouth once daily., Disp: , Rfl:     metoprolol tartrate (LOPRESSOR) 25 MG tablet, Take 1 tablet by mouth 2 (two) times daily., Disp: , Rfl:     OZEMPIC 1 mg/dose (4 mg/3 mL), Inject 1 mg into the skin every 7 days., Disp: 3 mL, Rfl: 3    pantoprazole (PROTONIX) 40 MG tablet, Take 1 tablet (40 mg total) by mouth once daily., Disp: 90 tablet, Rfl: 3

## 2025-06-11 NOTE — TELEPHONE ENCOUNTER
Patient given discharge instructions and verbalizes understanding    Vel Ordonez 39 y.o. male is here for Blood Pressure check. in person    Manual Blood pressure reading was 120/76, Pulse 63. (Checked at the end of the visit)    Pt's Home blood pressure machine read in office 119/83, Pulse 57.     Diagnosed with Hypertension yes.    Patient took blood pressure medication today yes.  Last dose of blood pressure medication was taken at 6:00am. Patient took amLODIPine (NORVASC) 10, lisinopriL (PRINIVIL,ZESTRIL) 40 & metoprolol tartrate (LOPRESSOR) 25.     All Medications and OTC medication updated yes    Does patient have record of home blood pressure readings / Blood Pressure Log yes. Pt states he takes his medication around 6:00-6:15am.     Does the pt have any complaints today in regards to their blood pressure medication? No Complains of Pt does not have any complaints, he feels like the medication is working. Patient is asymptomatic.     Were you sitting still for 5-10 minutes prior to taking your Blood pressure? yes     Has your blood pressure monitor ever been checked? yes When was last time we checked your blood pressure monitor? 06/11/2025    Updated vitals yes    Follow up date is 07/29/2025.     Dr. Ceja notified.     *Pt needs pended medications sent to [facility], pt states he does not use [facility].*  Creatinine   Date Value Ref Range Status   06/03/2025 0.9 0.5 - 1.4 mg/dL Final     Sodium   Date Value Ref Range Status   06/03/2025 140 136 - 145 mmol/L Final   03/12/2025 137 135 - 146 mmol/L Final   03/14/2023 142 136 - 145 mmol/L Final     Potassium   Date Value Ref Range Status   06/03/2025 4.4 3.5 - 5.1 mmol/L Final   03/12/2025 3.8 3.6 - 5.2 mmol/L Final   03/14/2023 4.3 3.5 - 5.1 mmol/L Final

## 2025-06-12 RX ORDER — BLOOD-GLUCOSE SENSOR
1 EACH MISCELLANEOUS DAILY
Qty: 5 EACH | Refills: 11 | Status: SHIPPED | OUTPATIENT
Start: 2025-06-12 | End: 2026-06-12

## 2025-06-12 RX ORDER — SEMAGLUTIDE 1.34 MG/ML
1 INJECTION, SOLUTION SUBCUTANEOUS
Qty: 3 ML | Refills: 3 | Status: SHIPPED | OUTPATIENT
Start: 2025-06-12

## 2025-06-12 RX ORDER — ATORVASTATIN CALCIUM 80 MG/1
80 TABLET, FILM COATED ORAL DAILY
Qty: 90 TABLET | Refills: 3 | Status: SHIPPED | OUTPATIENT
Start: 2025-06-12 | End: 2026-06-12

## 2025-06-13 ENCOUNTER — TELEPHONE (OUTPATIENT)
Dept: ENDOSCOPY | Facility: HOSPITAL | Age: 40
End: 2025-06-13
Payer: COMMERCIAL

## 2025-06-13 VITALS — WEIGHT: 250 LBS | HEIGHT: 76 IN | BODY MASS INDEX: 30.44 KG/M2

## 2025-06-13 DIAGNOSIS — R13.10 DYSPHAGIA, UNSPECIFIED TYPE: ICD-10-CM

## 2025-06-13 DIAGNOSIS — K21.9 GASTROESOPHAGEAL REFLUX DISEASE, UNSPECIFIED WHETHER ESOPHAGITIS PRESENT: Primary | ICD-10-CM

## 2025-06-13 NOTE — TELEPHONE ENCOUNTER
----- Message from Eliana sent at 6/12/2025 11:47 AM CDT -----  Regarding: FW: EGD    ----- Message -----  From: Bernard White DO  Sent: 6/11/2025   1:08 PM CDT  To: [facility]  Subject: EGD                                              Procedure: EGD    Diagnosis: GERD and Dysphagia    Procedure Timing: Within 12 weeks    Provider: Any GI provider    Location: Any Site    Additional Scheduling Information: Blood thinners    Prep Specifications:N/A    Is the patient taking a GLP-1 Agonist:yes    Have you attached a patient to this message: yes

## 2025-06-13 NOTE — TELEPHONE ENCOUNTER
Patient is scheduled for a Upper Endoscopy (EGD) on 8/12/25 with Dr. ALEJANDRO Hughes  Referral for procedure from [facility]     Pt is currently taking Eliquis (apixaban). Message sent to Endoscopy clearance nurse per protocol to submit Eliquis (apixaban) hold.

## 2025-06-17 ENCOUNTER — CLINICAL SUPPORT (OUTPATIENT)
Dept: DIABETES | Facility: CLINIC | Age: 40
End: 2025-06-17
Payer: COMMERCIAL

## 2025-06-17 VITALS — HEIGHT: 76 IN | BODY MASS INDEX: 30.44 KG/M2 | WEIGHT: 250 LBS

## 2025-06-17 DIAGNOSIS — E11.9 TYPE 2 DIABETES MELLITUS WITHOUT COMPLICATION, WITHOUT LONG-TERM CURRENT USE OF INSULIN: ICD-10-CM

## 2025-06-17 PROCEDURE — 99999 PR PBB SHADOW E&M-EST. PATIENT-LVL II: CPT | Mod: PBBFAC,,, | Performed by: REGISTERED NURSE

## 2025-06-17 PROCEDURE — G0108 DIAB MANAGE TRN  PER INDIV: HCPCS | Mod: S$GLB,,, | Performed by: FAMILY MEDICINE

## 2025-06-17 NOTE — PROGRESS NOTES
"Diabetes Care Specialist Progress Note  Author: Edith Lacy RN  Date: 6/17/2025    Intake    Program Intake  Reason for Diabetes Program Visit:: Initial Diabetes Assessment  Current diabetes risk level:: low  In the last month, have you used the ER or been admitted to the hospital: No    Current Diabetes Treatment: Diet/Exercise, Insulin, DM Injectables  DM Injectables Type/Dose: ozempic  Method of insulin delivery?: Injections  Injection Type: Pens    Continuous Glucose Monitoring  Patient has CGM: Yes  Personal CGM type:: franck- but sensor off at present time    Lab Results   Component Value Date    HGBA1C 5.8 (H) 06/03/2025       Weight: 113.4 kg (250 lb)   Height: 6' 4" (193 cm)   Body mass index is 30.43 kg/m².    Lifestyle Coping Support & Clinical    Lifestyle/Coping/Support  Compared to other people your age, how would you rate your health?: Good  Does anyone in your family have diabetes or does anyone in your family support you in your diabetes care?: whole family  List anything about Diabetes that causes you stress?: no  Learning Barriers:: None  Culture or Baptism beliefs that may impact ability to access healthcare: No  Psychosocial/Coping Skills Assessment Completed: : Yes  Assessment indicates:: Adequate understanding  Area of need?: No    Problem Review  Active Comorbidities: Hyperlipidemia/Dyslipidemia, Cardiovascular Disease    Diabetes Self-Management Skills Assessment    Medication Skills Assessment  Patient is able to identify current diabetes medications, dosages, and appropriate timing of medications.: yes  Patient reports problems or concerns with current medication regimen.: no  Patient is  aware that some diabetes medications can cause low blood sugar?: Yes  Medication Skills Assessment Completed:: Yes  Assessment indicates:: Adequate understanding  Area of need?: No    Diabetes Disease Process/Treatment Options  Diabetes Type?: Type II  When were you diagnosed?: 8 months  If previous " diabetes education, when/where:: no  What are your goals for this education session?: eat better  Is patient aware of what causes diabetes?: No  Does patient understand the pathophysiology of diabetes?: No  Diabetes Disease Process/Treatment Options: Skills Assessment Completed: Yes  Assessment indicates:: Knowledge deficit, Instruction Needed  Area of need?: Yes    Nutrition/Healthy Eating  Meal Plan 24 Hour Recall - Breakfast: 2 eggs grits  Meal Plan 24 Hour Recall - Lunch: 2 pieces jonatan chicken fries biscuit  Meal Plan 24 Hour Recall - Dinner: chicken nuggets  Meal Plan 24 Hour Recall - Snack: chips  Meal Plan 24 Hour Recall - Beverage: reguular coke 4-5 per day  Who shops/cooks?: pt cooks sometimes- lives with 10 year daughter- gets most foods from out and fast food.  Patient can identify foods that impact blood sugar.: no (see comments)  Challenges to healthy eating:: eating out, going to parties  Nutrition/Healthy Eating Skills Assessment Completed:: Yes  Assessment indicates:: Knowledge deficit, Instruction Needed  Area of need?: Yes    Physical Activity/Exercise  Patient's daily activity level:: sedentary  Patient formally exercises outside of work.: no  Reasons for not exercising:: time constraints  Patient can identify forms of physical activity.: no  Physical Activity/Exercise Skills Assessment Completed: : Yes  Assessment indicates:: Knowledge deficit, Instruction Needed  Area of need?: Yes    Home Blood Glucose Monitoring  Patient states that blood sugar is checked at home daily.: yes  Monitoring Method:: personal continuous glucose monitor  Personal CGM type:: franck- but sensor off at present time  Home Blood Glucose Monitoring Skills Assessment Completed: : Yes  Assessment indicates:: Knowledge deficit  Area of need?: Yes    Acute Complications  Have you ever had hypoglycemia (low BG 70 or less)?: yes  How often and what are your symptoms?: 3-4 times a day he has lows and in the middle of the night he  eats candy to treat  How do you treat hypoglycemia?: candy  Have you ever had hyperglycemia (high  or more)?: no  Have you ever had DKA?: no  Do you ever test for ketones?: no  Do you have a sick day plan?: no  Acute Complications Skills Assessment Completed: : Yes  Assessment indicates:: Knowledge deficit, Instruction Needed  Area of need?: Yes    Chronic Complications  Reviewed health maintenance: yes  Have you completed your annual diabetes maintenance labwork? : yes  Do you examine your feet daily?: yes  Has your doctor examined your feet?: yes  Do you see a Dentist?: yes  Dentist date of last visit:: 6 months  Do you see an eye doctor?: no (years)  Chronic Complications Skills Assessment Completed: : Yes  Assessment indicates:: Knowledge deficit, Instruction Needed  Area of need?: Yes      Assessment Summary and Plan    Based on today's diabetes care assessment, the following areas of need were identified:      Identified Areas of Need      Medication/Current Diabetes Treatment: No   Lifestyle Coping/Support: No   Diabetes Disease Process/Treatment Options: YesProvided Diabetes management guide and provided instruction regarding SS and consume increased amount of carbohydrate foods and risk factors of diabetes.Instructed patient on what is Diabetes and the progression of uncontrolled diabetes. Discussed qualifying parameters of diabetes diagnosis. Reviewed/Instructed on disease process. Discussed current treatment options, especially dietary and lifestyle changes as well as possible medication additions and/or changes. Patient verbalizes understanding of received information/education.   Nutrition/Healthy Eating: Yes Emphasized importance of eliminating all SSB. Discussed SF drink options. Discussed carb vs non-carb foods and reviewed appropriate amounts of carbs to have at meals vs snacks. Recommended 45 - 60 gm carb at meals and 15 gm carb at snacks. Instructed on appropriate label reading and serving  sizes of specific carb containing foods. Reviewed need to limit total/saturated fats. Discussed meal plans and snack ideas amenable to pt. Reviewed the plate method. Patient verbalizes understanding of received information/education.       Physical Activity/Exercise: Yes Discussed importance of adding physical activity in effort to help manage DM. Goal to increase physical activity to 5 times per week for 30 minutes. Provide different examples of exercise, including walking, jogging, cycling. Also provide pt with examples of chair exercise that can be done.    Home Blood Glucose Monitoring: Yes Provided patient with blood glucose logs, reviewed appropriate timing and frequency to SMBG, education on parameters on when to notify provider and advised patient to bring logs to all appts with PCP/Endocrinologist/Diabetes Care Specialist.Reviewed the importance of self-monitoring blood glucose and keeping logs.Instructed on how to self-monitor blood glucose using a home glucometer, how to properly dispose of used strips and lancets after use, and how to appropriately store meter and supplies.   Acute Complications: Yes Discussed hypoglycemia vs hyperglycemia symptoms and discussed appropriate treatments for each. Reviewed that pt is at high risk of hypo with current medication regimen. Discussed general vs severe hyperglycemia and risk of DKA.   Chronic Complications: YesProvided Diabetes management guide and provided instruction regarding the disease progression if diabetes is uncontrolled. Reviewed ways to decrease risk of chronic complications by healthy eating and having regular activity. Maintaining optimal blood glucose control. Following up with Diabetic team which includes PCP, MELCHOR MD (if applicable), yearly eye exam, yearly foot exam and Diabetes care specialist .Patient verbalizes understanding of received information/education.        Today's interventions were provided through individual discussion,  instruction, and written materials were provided.      Patient verbalized understanding of instruction and written materials.  Pt was able to return back demonstration of instructions today. Patient understood key points, needs reinforcement and further instruction.     Diabetes Self-Management Care Plan:    Today's Diabetes Self-Management Care Plan was developed with Vel Mead's input. Vel Mead has agreed to work toward the following goal(s) to improve his/her overall diabetes control.      Care Plan: Diabetes Management   Updates made since 6/17/2024 12:00 AM        Problem: Healthy Eating         Goal: Eat 3 meals daily with 45-60g/3-4 servings of Carbohydrate per meal.    Start Date: 6/17/2025   Expected End Date: 1/7/2026   Priority: Low   Barriers: Knowledge deficit   Note:    6/17/25  Instructed pt on the food groups, how to read labels and count carbs. Pt was given sample menus and meal plans as examples. Discussed with pt the importance of eating 3 balanced and portioned meals per day. Discussed with pt how to put his meals together. Pt lives with his. 10 year old daughter and he does the cooking sometimes but most of the food is gotten from out and fast foods. From discussion it was noted that pt is having 3-5 episodes of hypoglycemia per day which he treats with candy. He has lots of blood sugars in the 200's and his A1c results might be inaccurate due to the amount of episodes of hypoglycemia he is having. Discussed with pt the importance of wearing his franck all the time to know when he is having hypoglycemia. Discussed with pt in depth the signs,symptoms and treatment of hypoglycemia. Pt drinks 4-5 regular cokes per day and he said he is cutting down but does not like anything else to drink. Discussed with pt the amount of sugar in a regular soft drink, the portion is small and if consumed needs to be counted as part of his carbs for his meal. Pt does no exercise. Pt will work on making  healthier choices for meals.       Task: Reviewed the sources and role of Carbohydrate, Protein, and Fat and how each nutrient impacts blood sugar. Completed 6/17/2025        Task: Provided visual examples using dry measuring cups, food models, and other familiar objects such as computer mouse, deck or cards, tennis ball etc. to help with visualization of portions. Completed 6/17/2025        Task: Explained how to count carbohydrates using the food label and the use of dry measuring cups for accurate carb counting.         Task: Discussed strategies for choosing healthier menu options when dining out. Completed 6/17/2025        Task: Recommended replacing beverages containing high sugar content with noncaloric/sugar free options and/or water. Completed 6/17/2025        Task: Review the importance of balancing carbohydrates with each meal using portion control techniques to count servings of carbohydrate and label reading to identify serving size and amount of total carbs per serving. Completed 6/17/2025        Task: Provided Sample plate method and reviewed the use of the plate to estimate amounts of carbohydrate per meal. Completed 6/17/2025          Follow Up Plan     Follow up in about 5 weeks (around 7/22/2025) for evaluate meal planning blood sugars and med compliance.    Today's care plan and follow up schedule was discussed with patient.  Vel Mead verbalized understanding of the care plan, goals, and agrees to follow up plan.        The patient was encouraged to communicate with his/her health care provider/physician and care team regarding his/her condition(s) and treatment.  I provided the patient with my contact information today and encouraged to contact me via phone or [facility] as needed.     Length of Visit   Total Time: 60 Minutes

## 2025-06-19 ENCOUNTER — PATIENT MESSAGE (OUTPATIENT)
Dept: PRIMARY CARE CLINIC | Facility: CLINIC | Age: 40
End: 2025-06-19
Payer: COMMERCIAL

## 2025-06-20 ENCOUNTER — HOSPITAL ENCOUNTER (EMERGENCY)
Facility: HOSPITAL | Age: 40
Discharge: HOME | End: 2025-06-20
Attending: EMERGENCY MEDICINE
Payer: COMMERCIAL

## 2025-06-20 VITALS
BODY MASS INDEX: 30.44 KG/M2 | HEIGHT: 76 IN | OXYGEN SATURATION: 96 % | RESPIRATION RATE: 18 BRPM | TEMPERATURE: 98 F | SYSTOLIC BLOOD PRESSURE: 110 MMHG | HEART RATE: 63 BPM | WEIGHT: 250 LBS | DIASTOLIC BLOOD PRESSURE: 58 MMHG

## 2025-06-20 DIAGNOSIS — E16.2 HYPOGLYCEMIA: ICD-10-CM

## 2025-06-20 DIAGNOSIS — R42 DIZZINESS: ICD-10-CM

## 2025-06-20 DIAGNOSIS — E86.0 DEHYDRATION: Primary | ICD-10-CM

## 2025-06-20 LAB — POCT GLUCOSE: 106 MG/DL (ref 70–110)

## 2025-06-20 PROCEDURE — 93010 ELECTROCARDIOGRAM REPORT: CPT | Mod: ,,, | Performed by: INTERNAL MEDICINE

## 2025-06-20 PROCEDURE — 99283 EMERGENCY DEPT VISIT LOW MDM: CPT | Mod: 25

## 2025-06-20 PROCEDURE — 82962 GLUCOSE BLOOD TEST: CPT

## 2025-06-20 PROCEDURE — 93005 ELECTROCARDIOGRAM TRACING: CPT

## 2025-06-20 NOTE — Clinical Note
"Vel Melissa" José Luis was seen and treated in our emergency department on 6/20/2025.  He may return to work on 06/22/2025.       If you have any questions or concerns, please don't hesitate to call.      Hayden Urrutia MD"

## 2025-06-21 NOTE — ED PROVIDER NOTES
Encounter Date: 6/20/2025    SCRIBE #1 NOTE: I, Shaila Ceja, am scribing for, and in the presence of,  Hayden Urrutia MD. I have scribed the following portions of the note - Other sections scribed: HPI, ROS,PE.       History     Chief Complaint   Patient presents with    Dizziness     Pt states dizziness for 3 weeks and pt with holter monitor. Pt also concerned for BS problems. Pt BS on triage is 108. Pt states range at home has been 128 to 53. Pt with no acute distress in triage.pt is on BP medications     Vel Ordonez is a 39 y.o. male, with a PMHx of PSVT, atrial flutter, diabetes type 2, NSTEMI,  SVT, who presents to the ED with intermittent dizziness onset 3 weeks ago. Pt states he eats 3 times a day, but denies drinking enough water as needed. Patient reports being concerned for blood sugar problems. Pt states he experiences the stated symptoms when his blood sugar Is low, but feels better once he eats. He also reports checking his blood pressure at home, every hour, which is said to fall between  systolic but mainly between 100 and 110. Pt BS on triage is 108.  Blood glucose ranges at home mainly between 90 and 180 but did occasionally dropped down to 58 that was related to his lightheadedness that resolved when he ate something. No other exacerbating or alleviating factors. Denies fever, bloody nose, congestion, nausea, emesis, hematuria, polyuria, hematochezia, constipation, or other associated symptoms. Pt is currently compliant with Norvasc, Eliquis, Lipitor, blood-glucose sensor, Tambocor, Zestril, Lopressor, Protonix.          The history is provided by the patient. No  was used.     Review of patient's allergies indicates:  No Known Allergies  Past Medical History:   Diagnosis Date    Anxiety disorder 07/03/2022    Atrial flutter     Bipolar 1 disorder 2011    Depression     Diabetes mellitus     Diabetes mellitus, type 2     Hx of psychiatric care      Hypertension     Psychiatric problem     Supraventricular tachycardia     Therapy      Past Surgical History:   Procedure Laterality Date    ABLATION N/A 09/17/2018    Procedure: ABLATION;  Surgeon: Demian Davis MD;  Location: [facility] CATH LAB;  Service: Cardiology;  Laterality: N/A;  SVT, RFA, CHELSIE, MAC, DM, 3 PREP    SLEEP APNEA MONITOR INSERTION       Family History   Problem Relation Name Age of Onset    Cancer Mother          breast    Stroke Father      Heart disease Father      Diabetes Father       Social History[1]  Review of Systems   Constitutional:  Negative for chills and fever.   HENT:  Negative for congestion, nosebleeds, rhinorrhea and sore throat.    Eyes:  Negative for redness.   Respiratory:  Negative for cough.    Cardiovascular:  Negative for chest pain.   Gastrointestinal:  Negative for abdominal pain, blood in stool, constipation, diarrhea, nausea and vomiting.   Endocrine: Negative for polyuria.   Genitourinary:  Negative for difficulty urinating, dysuria and hematuria.   Musculoskeletal:  Negative for back pain.   Skin:  Negative for color change.   Neurological:  Positive for dizziness. Negative for syncope and weakness.   Psychiatric/Behavioral:  The patient is not nervous/anxious.    All other systems reviewed and are negative.      Physical Exam     Initial Vitals   BP Pulse Resp Temp SpO2   06/20/25 2115 06/20/25 2115 06/20/25 2115 06/20/25 2115 06/20/25 2116   99/61 71 18 98.1 °F (36.7 °C) 99 %      MAP       --                Physical Exam    Nursing note and vitals reviewed.  Constitutional: He appears well-developed and well-nourished.   HENT:   Head: Normocephalic and atraumatic. Mouth/Throat: Oropharynx is clear and moist.   Tongue is dry.   Eyes: EOM are normal. Pupils are equal, round, and reactive to light.   Neck:   Normal range of motion.  Cardiovascular:  Normal rate and regular rhythm.           Pulmonary/Chest: Breath sounds normal. No stridor. No respiratory distress. He  has no wheezes.   Abdominal: Abdomen is soft. Bowel sounds are normal. He exhibits no distension. There is no abdominal tenderness.   Musculoskeletal:         General: No tenderness or edema. Normal range of motion.      Cervical back: Normal range of motion.     Neurological: He is alert and oriented to person, place, and time. He has normal strength. GCS score is 15. GCS eye subscore is 4. GCS verbal subscore is 5. GCS motor subscore is 6.   Skin: Skin is warm and dry. Capillary refill takes 2 to 3 seconds.   Psychiatric: He has a normal mood and affect. Thought content normal.         ED Course   Procedures  Labs Reviewed   POCT GLUCOSE       Result Value    POCT Glucose 106       EKG Readings: (Independently Interpreted)   EKG done at 9:20 p.m. showed normal sinus rhythm rate 72.  Normal axis QRS.  No ST elevation.  Incomplete right bundle-branch block.  QTC is 427.  Nonspecific EKG compared to previous and similar.       Imaging Results    None          Medications - No data to display  Medical Decision Making  Differential diagnosis include but is not limited to:  Hyperglycemia, orthostatic hypotension, dehydration, normal variants of blood pressure, decreased p.o. intake    39-year-old male presenting secondary to feeling lightheaded whenever his blood sugar drops or whenever his blood pressure is lower.  Patient has had blood pressure changes from last year.  He showed me how he checks his blood pressure over the last year.  His blood pressure recently mostly is been between 100 and 110 systolic.  I discussed with him talking to his primary care regarding any future changes.  Blood sugars mainly been normal.  Occasional hypoglycemia that resolves.  Will actually talked about his p.o. intake and fluid intake it does not seem like it is enough for his height and weight and size.  Talked to him about p.o. intake.  Outpatient follow-up.  Patient is not orthostatic. I discussed with the patient/family the  diagnosis, treatment plan, indications for return to the emergency department, and for expected follow-up. The patient/family verbalized an understanding. The patient/family is asked if there are any questions or concerns. We discuss the case, until all issues are addressed to the patient/family's satisfaction. Patient/family understands and is agreeable to the plan.   Hayden Urrutia    DISCLAIMER: This note was prepared with [employer] voice recognition transcription software.       Amount and/or Complexity of Data Reviewed  Labs: ordered. Decision-making details documented in ED Course.  ECG/medicine tests: ordered and independent interpretation performed. Decision-making details documented in ED Course.                                      Clinical Impression:  Final diagnoses:  [R42] Dizziness  [E86.0] Dehydration (Primary)  [E16.2] Hypoglycemia          ED Disposition Condition    Discharge Stable          ED Prescriptions    None       Follow-up Information       Follow up With Specialties Details Why Contact Info    Opal Ceja MD Family Medicine Schedule an appointment as soon as possible for a visit in 2 days  [address] [place] [address]  [address]  [place] [address]  [phone]                     [1]   Social History  Tobacco Use    Smoking status: Never    Smokeless tobacco: Never   Substance Use Topics    Alcohol use: Yes     Comment: rare    Drug use: No        Hayden Urrutia MD  06/21/25 0002

## 2025-06-21 NOTE — DISCHARGE INSTRUCTIONS

## 2025-06-23 ENCOUNTER — PATIENT MESSAGE (OUTPATIENT)
Dept: PRIMARY CARE CLINIC | Facility: CLINIC | Age: 40
End: 2025-06-23

## 2025-06-23 ENCOUNTER — OFFICE VISIT (OUTPATIENT)
Dept: PRIMARY CARE CLINIC | Facility: CLINIC | Age: 40
End: 2025-06-23
Payer: COMMERCIAL

## 2025-06-23 DIAGNOSIS — E11.9 TYPE 2 DIABETES MELLITUS WITHOUT COMPLICATION, WITHOUT LONG-TERM CURRENT USE OF INSULIN: Primary | ICD-10-CM

## 2025-06-23 DIAGNOSIS — I48.0 PAROXYSMAL A-FIB: ICD-10-CM

## 2025-06-23 DIAGNOSIS — I10 ESSENTIAL HYPERTENSION: ICD-10-CM

## 2025-06-23 DIAGNOSIS — Z86.79 S/P ABLATION OPERATION FOR ARRHYTHMIA: ICD-10-CM

## 2025-06-23 DIAGNOSIS — Z98.890 S/P ABLATION OPERATION FOR ARRHYTHMIA: ICD-10-CM

## 2025-06-23 DIAGNOSIS — R55 PRE-SYNCOPE: ICD-10-CM

## 2025-06-23 PROCEDURE — 98006 SYNCH AUDIO-VIDEO EST MOD 30: CPT | Mod: 95,,, | Performed by: FAMILY MEDICINE

## 2025-06-23 RX ORDER — AMLODIPINE BESYLATE 5 MG/1
5 TABLET ORAL DAILY
Qty: 90 TABLET | Refills: 1 | Status: SHIPPED | OUTPATIENT
Start: 2025-06-23

## 2025-06-23 RX ORDER — METOPROLOL SUCCINATE 25 MG/1
25 TABLET, EXTENDED RELEASE ORAL DAILY
Qty: 90 TABLET | Refills: 3 | Status: SHIPPED | OUTPATIENT
Start: 2025-06-23 | End: 2026-06-23

## 2025-06-23 RX ORDER — BLOOD-GLUCOSE SENSOR
1 EACH MISCELLANEOUS DAILY
Qty: 5 EACH | Refills: 6 | Status: SHIPPED | OUTPATIENT
Start: 2025-06-23 | End: 2026-06-23

## 2025-06-23 NOTE — PROGRESS NOTES
"Subjective:       Patient ID: Vel Ordonez is a 39 y.o. male.    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     He was in ER on 6/20 for dizziness, he had normal glucose, bp and EKG reassuring, they recommended hydration and eating more regularly    He is feeling lightheaded over the past few days, occurs 2-3 times a day, he has times when he feels like he will pass out, occurs at random, feels like his heart races when this occurs, lasts a minute and resolves, he denies f/n/v/vision changes/d/constipation/cp, he gets sob at times with activity. Home bp running /70's for the past 4 days, he reports his weight is up 5 pounds over the past week. Appetite decreased on Ozempic, he eating 3 times a day and trying to be healthy, he tries to drink fluids, he does gatorade zero and water, he has regular coke in the morning, tea in the afternoon.     Home blood sugars , his low blood sugars tend to be in the morning     He has L foot pain off and on for 6 weeks, he feels like "he is stepping on needles", occurs somedays and not others, has podiatry appt set     DM2: never been to endocrine, diagnosed about 15 month ago A1c 6.6 4/2024 , Ozempic 1 mg weekly, A1c 5.8 6/2025  SVT s/p AVNRT 2018/pAfib/HTN/HLPD/low HDL: Flecainide 50 mg twice daily, lisinopril 40 mg daily, Lopressor 25 mg bid, amlodipine 10 mg daily  Family hx of early CAD in father at age 31  ROJELIO s/p hypoglossal nerve stimulator, following with ENT at [facility]  CLBP  Bipolar 1 d/o/PERLA: following with psychiatry; hx of hospitalization for SI a few years ago; Valium prn only; Depakote  mg daily, Prozac 40 mg daily  Eye exam due  Dental utd     Review of Systems " "  Constitutional:  Negative for activity change and unexpected weight change.   HENT:  Positive for trouble swallowing. Negative for hearing loss and rhinorrhea.    Eyes:  Negative for discharge and visual disturbance.   Respiratory:  Negative for chest tightness and wheezing.    Cardiovascular:  Negative for chest pain and palpitations.   Gastrointestinal:  Negative for blood in stool, constipation, diarrhea and vomiting.   Endocrine: Positive for polydipsia. Negative for polyuria.   Genitourinary:  Negative for difficulty urinating, hematuria and urgency.   Musculoskeletal:  Positive for arthralgias and neck pain. Negative for joint swelling.   Neurological:  Positive for weakness and headaches.   Psychiatric/Behavioral:  Positive for confusion and dysphoric mood.        Objective:      There were no vitals taken for this visit.  Physical Exam  Constitutional:       General: He is not in acute distress.     Appearance: He is well-developed. He is not diaphoretic.   HENT:      Head: Normocephalic and atraumatic.   Pulmonary:      Effort: No respiratory distress.   Neurological:      Mental Status: He is alert and oriented to person, place, and time.         Assessment:       1. Type 2 diabetes mellitus without complication, without long-term current use of insulin    2. Paroxysmal A-fib    3. S/P ablation operation for arrhythmia    4. Pre-syncope    5. Essential hypertension        Plan:   Vel Melissa" was seen today for hypotension.    Diagnoses and all orders for this visit:    Type 2 diabetes mellitus without complication, without long-term current use of insulin  -     blood-glucose sensor (DEXCOM G7 SENSOR) Martha; 1 Device by Misc.(Non-Drug; Combo Route) route Daily.    Paroxysmal A-fib  -     metoprolol succinate (TOPROL-XL) 25 MG 24 hr tablet; Take 1 tablet (25 mg total) by mouth once daily.    S/P ablation operation for arrhythmia  -     metoprolol succinate (TOPROL-XL) 25 MG 24 hr tablet; Take 1 " tablet (25 mg total) by mouth once daily.    Pre-syncope    Essential hypertension  -     amLODIPine (NORVASC) 5 MG tablet; Take 1 tablet (5 mg total) by mouth once daily.  -     metoprolol succinate (TOPROL-XL) 25 MG 24 hr tablet; Take 1 tablet (25 mg total) by mouth once daily.    The patient location is: [place]  The chief complaint leading to consultation is: dm2, htn    Visit type: audiovisual    Face to Face time with patient:30 minutes of total time spent on the encounter, which includes face to face time and non-face to face time preparing to see the patient (eg, review of tests), Obtaining and/or reviewing separately obtained history, Documenting clinical information in the electronic or other health record, Independently interpreting results (not separately reported) and communicating results to the patient/family/caregiver, or Care coordination (not separately reported).         Each patient to whom he or she provides medical services by telemedicine is:  (1) informed of the relationship between the physician and patient and the respective role of any other health care provider with respect to management of the patient; and (2) notified that he or she may decline to receive medical services by telemedicine and may withdraw from such care at any time.    Notes:

## 2025-06-23 NOTE — PATIENT INSTRUCTIONS
Start your day with 8-16 ounces of water and a meal, then have your coke try to decrease the amount you drink, get a mini can

## 2025-06-25 DIAGNOSIS — E11.9 TYPE 2 DIABETES MELLITUS WITHOUT COMPLICATION: ICD-10-CM

## 2025-06-25 LAB
OHS QRS DURATION: 116 MS
OHS QTC CALCULATION: 427 MS

## 2025-06-26 ENCOUNTER — PATIENT MESSAGE (OUTPATIENT)
Dept: PRIMARY CARE CLINIC | Facility: CLINIC | Age: 40
End: 2025-06-26
Payer: COMMERCIAL

## 2025-06-26 NOTE — TELEPHONE ENCOUNTER
LOV with Opal Ceja MD , 6/23/2025    Patient states BP is still low with headache  102/67 o 87 Blood sugar 158    Note dated 6/23/25:  He was in ER on 6/20 for dizziness, he had normal glucose, bp and EKG reassuring, they recommended hydration and eating more regularly     He is feeling lightheaded over the past few days, occurs 2-3 times a day, he has times when he feels like he will pass out, occurs at random, feels like his heart races when this occurs, lasts a minute and resolves, he denies f/n/v/vision changes/d/constipation/cp, he gets sob at times with activity. Home bp running /70's for the past 4 days, he reports his weight is up 5 pounds over the past week. Appetite decreased on Ozempic, he eating 3 times a day and trying to be healthy, he tries to drink fluids, he does gatorade zero and water, he has regular coke in the morning, tea in the afternoon.      Home blood sugars , his low blood sugars tend to be in the morning

## 2025-06-27 PROBLEM — Z86.79 HISTORY OF ATRIAL FLUTTER: Status: ACTIVE | Noted: 2019-01-25

## 2025-06-30 NOTE — TELEPHONE ENCOUNTER
Looks like in the ER on 6/27 he advised to stop all bp medications including amlodipine, toprol xl and lisinopril, please see what his bp is running off meds and how he is doing

## 2025-07-01 ENCOUNTER — OFFICE VISIT (OUTPATIENT)
Dept: PSYCHIATRY | Facility: CLINIC | Age: 40
End: 2025-07-01
Payer: COMMERCIAL

## 2025-07-01 VITALS
SYSTOLIC BLOOD PRESSURE: 122 MMHG | HEART RATE: 69 BPM | WEIGHT: 256.38 LBS | DIASTOLIC BLOOD PRESSURE: 80 MMHG | BODY MASS INDEX: 31.21 KG/M2

## 2025-07-01 DIAGNOSIS — F31.9 BIPOLAR 1 DISORDER: Primary | Chronic | ICD-10-CM

## 2025-07-01 DIAGNOSIS — G47.00 INSOMNIA DISORDER WITH NON-SLEEP DISORDER MENTAL COMORBIDITY: ICD-10-CM

## 2025-07-01 DIAGNOSIS — F41.9 ANXIETY DISORDER, UNSPECIFIED TYPE: ICD-10-CM

## 2025-07-01 PROCEDURE — 99999 PR PBB SHADOW E&M-EST. PATIENT-LVL IV: CPT | Mod: PBBFAC,,, | Performed by: NURSE PRACTITIONER

## 2025-07-01 PROCEDURE — 99214 OFFICE O/P EST MOD 30 MIN: CPT | Mod: S$GLB,,, | Performed by: NURSE PRACTITIONER

## 2025-07-01 RX ORDER — DIVALPROEX SODIUM 500 MG/1
500 TABLET, FILM COATED, EXTENDED RELEASE ORAL 3 TIMES DAILY
Qty: 270 TABLET | Refills: 3 | Status: SHIPPED | OUTPATIENT
Start: 2025-07-01

## 2025-07-01 RX ORDER — OLANZAPINE 5 MG/1
5 TABLET, FILM COATED ORAL NIGHTLY
Qty: 90 TABLET | Refills: 3 | Status: SHIPPED | OUTPATIENT
Start: 2025-07-01

## 2025-07-01 RX ORDER — FLUOXETINE HYDROCHLORIDE 40 MG/1
40 CAPSULE ORAL DAILY
Qty: 90 CAPSULE | Refills: 3 | Status: SHIPPED | OUTPATIENT
Start: 2025-07-01

## 2025-07-01 NOTE — PROGRESS NOTES
Outpatient Psychiatry Follow-Up Visit (MD/NP)    7/1/2025    Clinical Status of Patient:  Outpatient (Ambulatory)    Chief Complaint:  Vel Ordonez is a 39 y.o. male who presents today for follow-up of mood disorder.  Met with patient.      Last visit was: 3/31/25.  Chart and  reviewed.     Interval History and Content of Current Session:  Current Psychiatric Medications/changes  Labs: reviewed Valproic Acid (106.7)  Depakote  mg 3 tabs daily  Increase to Prozac 40 mg daily    Reports he started job at a different [email or web address] location. States he still has trouble with managing anger and mood. Compliant with medications and denies side effects. Reviewed Valproic Acid (72.9). Thought processes are linear, clear, and organized. Denies SI/HI/AVH. Will start Zyprexa.         Labs: most recent results Valproic Acid (72.9)     Psychotherapy:  Target symptoms: mood swings, mood disorder  Why chosen therapy is appropriate versus another modality: relevant to diagnosis  Outcome monitoring methods: self-report  Therapeutic intervention type: insight oriented psychotherapy  Topics discussed/themes: building skills sets for symptom management, symptom recognition  The patient's response to the intervention is accepting. The patient's progress toward treatment goals is good.   Duration of intervention: 13 minutes.    Review of Systems   PSYCHIATRIC: Pertinant items are noted in the narrative.  CONSTITUTIONAL: No weight gain or loss.   MUSCULOSKELETAL: No pain or stiffness of the joints.  NEUROLOGIC: No weakness, sensory changes, seizures, confusion, memory loss, tremor or other abnormal movements.  ENDOCRINE: No polydipsia or polyuria.  INTEGUMENTARY: No rashes or lacerations.  EYES: No exophthalmos, jaundice or blindness.  ENT: No dizziness, tinnitus or hearing loss.  RESPIRATORY: No shortness of breath.  CARDIOVASCULAR: No tachycardia or chest pain.  GASTROINTESTINAL: No nausea, vomiting, pain, constipation or  diarrhea.  GENITOURINARY: No frequency, dysuria or sexual dysfunction.  HEMATOLOGIC/LYMPHATIC: No excessive bleeding, prolonged or excessive bleeding after dental extraction/injury.  ALLERGIC/IMMUNOLOGIC: No allergic response to materials, foods or animals at this time.    Past Medical, Family and Social History: The patient's past medical, family and social history have been reviewed and updated as appropriate within the electronic medical record - see encounter notes.    Compliance: yes    Side effects: None    Risk Parameters:  Patient reports no suicidal ideation  Patient reports no homicidal ideation  Patient reports no self-injurious behavior  Patient reports no violent behavior    Exam (detailed: at least 9 elements; comprehensive: all 15 elements)   Constitutional  Vitals:  Most recent vital signs, dated greater than 90 days prior to this appointment, were reviewed.   Vitals:    07/01/25 0941   BP: 122/80   Pulse: 69   Weight: 116.3 kg (256 lb 6.3 oz)      General:  unremarkable, age appropriate     Musculoskeletal  Muscle Strength/Tone:  no tremor, no tic   Gait & Station:  non-ataxic     Psychiatric  Speech:  no latency; no press   Mood & Affect:  euthymic  congruent and appropriate   Thought Process:  normal and logical   Associations:  intact   Thought Content:  normal, no suicidality, no homicidality, delusions, or paranoia   Insight:  intact   Judgement: behavior is adequate to circumstances   Orientation:  grossly intact   Memory: intact for content of interview   Language: grossly intact   Attention Span & Concentration:  able to focus   Fund of Knowledge:  intact and appropriate to age and level of education     Assessment and Diagnosis   Status/Progress: Based on the examination today, the patient's problem(s) is/are adequately but not ideally controlled.  New problems have been presented today (insomnia).   Co-morbidities and Lack of compliance are not complicating management of the primary  condition.  There are no active rule-out diagnoses for this patient at this time.     General Impression:       ICD-10-CM ICD-9-CM   1. Bipolar 1 disorder  F31.9 296.7   2. Anxiety disorder, unspecified type  F41.9 300.00   3. Insomnia disorder with non-sleep disorder mental comorbidity  G47.00 780.52     Intervention/Counseling/Treatment Plan   Medication Management: Continue current medications. The risks and benefits of medication were discussed with the patient.  Labs: reviewed Valproic Acid (72.9)  Depakote  mg 3 tabs daily  Prozac 40 mg daily  Start Zyprexa 5 mg every evening.    Return to Clinic: 6 months     Risks, benefits, side effects and alternative treatments discussed with patient. Patient agrees with the current plan as documented.  Encouraged Patient to keep future appointments.  Take medications as prescribed and abstain from substance abuse.  Pt to present to ED for thoughts to harm himself or others

## 2025-07-02 ENCOUNTER — OFFICE VISIT (OUTPATIENT)
Dept: CARDIOLOGY | Facility: CLINIC | Age: 40
End: 2025-07-02
Payer: COMMERCIAL

## 2025-07-02 VITALS
OXYGEN SATURATION: 98 % | WEIGHT: 253.5 LBS | BODY MASS INDEX: 30.87 KG/M2 | HEART RATE: 68 BPM | HEIGHT: 76 IN | SYSTOLIC BLOOD PRESSURE: 113 MMHG | DIASTOLIC BLOOD PRESSURE: 75 MMHG

## 2025-07-02 DIAGNOSIS — E78.2 MIXED HYPERLIPIDEMIA: ICD-10-CM

## 2025-07-02 DIAGNOSIS — R55 PRE-SYNCOPE: ICD-10-CM

## 2025-07-02 DIAGNOSIS — Z86.79 HISTORY OF SUPRAVENTRICULAR TACHYCARDIA: ICD-10-CM

## 2025-07-02 DIAGNOSIS — F31.9 BIPOLAR 1 DISORDER: Chronic | ICD-10-CM

## 2025-07-02 DIAGNOSIS — Z98.890 S/P ABLATION OPERATION FOR ARRHYTHMIA: ICD-10-CM

## 2025-07-02 DIAGNOSIS — Z86.79 S/P RF ABLATION OPERATION FOR ARRHYTHMIA: Primary | ICD-10-CM

## 2025-07-02 DIAGNOSIS — Z86.79 S/P ABLATION OPERATION FOR ARRHYTHMIA: ICD-10-CM

## 2025-07-02 DIAGNOSIS — Z86.79 HISTORY OF ATRIAL FLUTTER: ICD-10-CM

## 2025-07-02 DIAGNOSIS — I47.10 PAROXYSMAL SVT (SUPRAVENTRICULAR TACHYCARDIA): ICD-10-CM

## 2025-07-02 DIAGNOSIS — R07.89 OTHER CHEST PAIN: ICD-10-CM

## 2025-07-02 DIAGNOSIS — Z98.890 S/P RF ABLATION OPERATION FOR ARRHYTHMIA: Primary | ICD-10-CM

## 2025-07-02 PROBLEM — R07.9 CHEST PAIN: Status: RESOLVED | Noted: 2018-07-21 | Resolved: 2025-07-02

## 2025-07-02 PROCEDURE — 99999 PR PBB SHADOW E&M-EST. PATIENT-LVL V: CPT | Mod: PBBFAC,,, | Performed by: INTERNAL MEDICINE

## 2025-07-03 ENCOUNTER — TELEPHONE (OUTPATIENT)
Dept: PRIMARY CARE CLINIC | Facility: CLINIC | Age: 40
End: 2025-07-03

## 2025-07-03 ENCOUNTER — CLINICAL SUPPORT (OUTPATIENT)
Dept: PRIMARY CARE CLINIC | Facility: CLINIC | Age: 40
End: 2025-07-03
Payer: COMMERCIAL

## 2025-07-03 VITALS — SYSTOLIC BLOOD PRESSURE: 99 MMHG | DIASTOLIC BLOOD PRESSURE: 58 MMHG

## 2025-07-03 DIAGNOSIS — R03.1 BLOOD PRESSURE ABNORMALLY LOW: Primary | ICD-10-CM

## 2025-07-03 PROCEDURE — 99999 PR PBB SHADOW E&M-EST. PATIENT-LVL II: CPT | Mod: PBBFAC,,,

## 2025-07-03 NOTE — TELEPHONE ENCOUNTER
His readings look good, I am ok if he stays off the metoprolol however he should discuss with cardiology as well

## 2025-07-03 NOTE — TELEPHONE ENCOUNTER
Vel Ordonez 39 y.o. male is here for Blood Pressure check. remote     Most recent home BP reading was 99/58, pulse 66.     Diagnosed with Hypertension yes.     Patient took blood pressure medication today no.  Last dose of blood pressure medication was taken at [facility] was 6/25/25. Patient took Metoprolol 25 mg in addition to all other prescribed medications.      All Medications and OTC medication updated yes  He is not currently taking BP medication or Eliquis 5 mg. He cannot afford the Eliquis, and discontinued the metoprolol on 6/26 per ER provider.     Does patient have record of home blood pressure readings / Blood Pressure Log yes. In your box     Does the pt have any complaints today in regards to their blood pressure medication? Not currently taking any BP medication. Patient is symptomatic. Complains of Headache.      Denies dizziness, visual disturbances chest pain, SOB, edema, confusion, syncope or weakness.      Were you sitting still for 5-10 minutes prior to taking your Blood pressure? yes      Has your blood pressure monitor ever been checked? yes When was last time we checked your blood pressure monitor? 6/11/25     Updated vitals yes     Follow up date is 4/29/25.      Dr. Ceja notified.

## 2025-07-03 NOTE — PROGRESS NOTES
Vel Ordonez 39 y.o. male is here for Blood Pressure check. remote    Most recent home BP reading was 99/58, pulse 66.    Diagnosed with Hypertension yes.    Patient took blood pressure medication today no.  Last dose of blood pressure medication was taken at [facility] was 6/25/25. Patient took Metoprolol 25 mg in addition to all other prescribed medications.     All Medications and OTC medication updated yes  He is not currently taking BP medication or Eliquis 5 mg. He cannot afford the Eliquis, and discontinued the metoprolol on 6/26 per ER provider.    Does patient have record of home blood pressure readings / Blood Pressure Log yes. In your box    Does the pt have any complaints today in regards to their blood pressure medication? Not currently taking any BP medication. Patient is symptomatic. Complains of Headache.     Denies dizziness, visual disturbances chest pain, SOB, edema, confusion, syncope or weakness.     Were you sitting still for 5-10 minutes prior to taking your Blood pressure? yes     Has your blood pressure monitor ever been checked? yes When was last time we checked your blood pressure monitor? 6/11/25    Updated vitals yes    Follow up date is 4/29/25.     Dr. Ceja notified.     Creatinine   Date Value Ref Range Status   06/26/2025 1.0 0.5 - 1.4 mg/dL Final     Sodium   Date Value Ref Range Status   06/26/2025 137 136 - 145 mmol/L Final   03/12/2025 137 135 - 146 mmol/L Final   03/14/2023 142 136 - 145 mmol/L Final     Potassium   Date Value Ref Range Status   06/26/2025 4.2 3.5 - 5.1 mmol/L Final   03/12/2025 3.8 3.6 - 5.2 mmol/L Final   03/14/2023 4.3 3.5 - 5.1 mmol/L Final

## 2025-07-03 NOTE — TELEPHONE ENCOUNTER
Spoke to pt advised per Dr Ceja:   His readings look good, I am ok if he stays off the metoprolol however he should discuss with cardiology as well    Pt is aware and understands

## 2025-07-09 ENCOUNTER — HOSPITAL ENCOUNTER (OUTPATIENT)
Dept: CARDIOLOGY | Facility: HOSPITAL | Age: 40
Discharge: HOME | End: 2025-07-09
Attending: INTERNAL MEDICINE
Payer: COMMERCIAL

## 2025-07-09 VITALS
WEIGHT: 253 LBS | HEART RATE: 56 BPM | HEIGHT: 76 IN | SYSTOLIC BLOOD PRESSURE: 123 MMHG | DIASTOLIC BLOOD PRESSURE: 87 MMHG | BODY MASS INDEX: 30.81 KG/M2

## 2025-07-09 DIAGNOSIS — R07.89 OTHER CHEST PAIN: ICD-10-CM

## 2025-07-09 LAB
CV PHARM DOSE: 0.4 MG
CV STRESS BASE HR: 56 BPM
DIASTOLIC BLOOD PRESSURE: 87 MMHG
EJECTION FRACTION- HIGH: 65 %
END DIASTOLIC INDEX-HIGH: 153 ML/M2
END DIASTOLIC INDEX-LOW: 93 ML/M2
END SYSTOLIC INDEX-HIGH: 71 ML/M2
END SYSTOLIC INDEX-LOW: 31 ML/M2
NUC REST DIASTOLIC VOLUME INDEX: 144
NUC REST EJECTION FRACTION: 55
NUC REST SYSTOLIC VOLUME INDEX: 65
NUC STRESS DIASTOLIC VOLUME INDEX: 154
NUC STRESS EJECTION FRACTION: 58 %
NUC STRESS SYSTOLIC VOLUME INDEX: 65
OHS CV CPX 85 PERCENT MAX PREDICTED HEART RATE MALE: 156
OHS CV CPX MAX PREDICTED HEART RATE: 183
OHS CV CPX PATIENT IS FEMALE: 0
OHS CV CPX PATIENT IS MALE: 1
OHS CV CPX PEAK DIASTOLIC BLOOD PRESSURE: 88 MMHG
OHS CV CPX PEAK HEAR RATE: 52 BPM
OHS CV CPX PEAK RATE PRESSURE PRODUCT: 6136
OHS CV CPX PEAK SYSTOLIC BLOOD PRESSURE: 118 MMHG
OHS CV CPX PERCENT MAX PREDICTED HEART RATE ACHIEVED: 28
OHS CV CPX RATE PRESSURE PRODUCT PRESENTING: 6888
OHS CV INITIAL DOSE: 10.4 MCG/KG/MIN
OHS CV PEAK DOSE: 30.5 MCG/KG/MIN
RETIRED EF AND QEF - SEE NOTES: 53 %
SYSTOLIC BLOOD PRESSURE: 123 MMHG

## 2025-07-09 PROCEDURE — A9502 TC99M TETROFOSMIN: HCPCS | Performed by: INTERNAL MEDICINE

## 2025-07-09 PROCEDURE — 63600175 PHARM REV CODE 636 W HCPCS: Performed by: INTERNAL MEDICINE

## 2025-07-09 PROCEDURE — 78452 HT MUSCLE IMAGE SPECT MULT: CPT

## 2025-07-09 RX ORDER — AMINOPHYLLINE 25 MG/ML
75 INJECTION, SOLUTION INTRAVENOUS ONCE
Status: COMPLETED | OUTPATIENT
Start: 2025-07-09 | End: 2025-07-09

## 2025-07-09 RX ORDER — REGADENOSON 0.08 MG/ML
0.4 INJECTION, SOLUTION INTRAVENOUS
Status: COMPLETED | OUTPATIENT
Start: 2025-07-09 | End: 2025-07-09

## 2025-07-09 RX ADMIN — TETROFOSMIN 10.4 MILLICURIE: 1.38 INJECTION, POWDER, LYOPHILIZED, FOR SOLUTION INTRAVENOUS at 08:07

## 2025-07-09 RX ADMIN — REGADENOSON 0.4 MG: 0.08 INJECTION, SOLUTION INTRAVENOUS at 09:07

## 2025-07-09 RX ADMIN — AMINOPHYLLINE 75 MG: 25 INJECTION, SOLUTION INTRAVENOUS at 09:07

## 2025-07-09 RX ADMIN — TETROFOSMIN 30.5 MILLICURIE: 1.38 INJECTION, POWDER, LYOPHILIZED, FOR SOLUTION INTRAVENOUS at 09:07

## 2025-07-11 ENCOUNTER — PATIENT MESSAGE (OUTPATIENT)
Dept: CARDIOLOGY | Facility: CLINIC | Age: 40
End: 2025-07-11
Payer: COMMERCIAL

## 2025-07-11 DIAGNOSIS — R07.89 OTHER CHEST PAIN: ICD-10-CM

## 2025-07-11 DIAGNOSIS — I47.10 PAROXYSMAL SVT (SUPRAVENTRICULAR TACHYCARDIA): Primary | ICD-10-CM

## 2025-07-11 DIAGNOSIS — R42 DIZZINESS: ICD-10-CM

## 2025-07-15 ENCOUNTER — PATIENT MESSAGE (OUTPATIENT)
Dept: PODIATRY | Facility: CLINIC | Age: 40
End: 2025-07-15
Payer: COMMERCIAL

## 2025-07-17 ENCOUNTER — PATIENT MESSAGE (OUTPATIENT)
Dept: CARDIOLOGY | Facility: CLINIC | Age: 40
End: 2025-07-17

## 2025-07-17 ENCOUNTER — HOSPITAL ENCOUNTER (OUTPATIENT)
Dept: CARDIOLOGY | Facility: HOSPITAL | Age: 40
Discharge: HOME | End: 2025-07-17
Attending: INTERNAL MEDICINE
Payer: COMMERCIAL

## 2025-07-17 ENCOUNTER — PATIENT MESSAGE (OUTPATIENT)
Dept: DIABETES | Facility: CLINIC | Age: 40
End: 2025-07-17
Payer: COMMERCIAL

## 2025-07-17 ENCOUNTER — PATIENT MESSAGE (OUTPATIENT)
Dept: PRIMARY CARE CLINIC | Facility: CLINIC | Age: 40
End: 2025-07-17
Payer: COMMERCIAL

## 2025-07-17 VITALS
HEART RATE: 66 BPM | WEIGHT: 255 LBS | HEIGHT: 76 IN | DIASTOLIC BLOOD PRESSURE: 82 MMHG | BODY MASS INDEX: 31.05 KG/M2 | SYSTOLIC BLOOD PRESSURE: 133 MMHG

## 2025-07-17 DIAGNOSIS — R42 DIZZINESS: ICD-10-CM

## 2025-07-17 DIAGNOSIS — R07.89 OTHER CHEST PAIN: ICD-10-CM

## 2025-07-17 DIAGNOSIS — I47.10 PAROXYSMAL SVT (SUPRAVENTRICULAR TACHYCARDIA): ICD-10-CM

## 2025-07-17 LAB
CFR FLOW - ANTERIOR: 2.31
CFR FLOW - INFERIOR: 2.46
CFR FLOW - LATERAL: 2.36
CFR FLOW - MAX: 3.13
CFR FLOW - MIN: 1.63
CFR FLOW - SEPTAL: 2.43
CFR FLOW - WHOLE HEART: 2.39
CV PHARM DOSE: 0.4 MG
CV STRESS BASE HR: 66 BPM
DIASTOLIC BLOOD PRESSURE: 82 MMHG
EJECTION FRACTION- HIGH: 65 %
END DIASTOLIC INDEX-HIGH: 153 ML/M2
END DIASTOLIC INDEX-LOW: 93 ML/M2
END SYSTOLIC INDEX-HIGH: 71 ML/M2
END SYSTOLIC INDEX-LOW: 31 ML/M2
NUC REST DIASTOLIC VOLUME INDEX: 168
NUC REST EJECTION FRACTION: 57
NUC REST SYSTOLIC VOLUME INDEX: 72
NUC STRESS DIASTOLIC VOLUME INDEX: 173
NUC STRESS EJECTION FRACTION: 57 %
NUC STRESS SYSTOLIC VOLUME INDEX: 75
OHS CV CPX 85 PERCENT MAX PREDICTED HEART RATE MALE: 156
OHS CV CPX MAX PREDICTED HEART RATE: 183
OHS CV CPX PATIENT HEIGHT IN: 76
OHS CV CPX PATIENT IS FEMALE: 0
OHS CV CPX PATIENT IS MALE: 1
OHS CV CPX PEAK DIASTOLIC BLOOD PRESSURE: 62 MMHG
OHS CV CPX PEAK HEAR RATE: 67 BPM
OHS CV CPX PEAK RATE PRESSURE PRODUCT: 8040
OHS CV CPX PEAK SYSTOLIC BLOOD PRESSURE: 120 MMHG
OHS CV CPX PERCENT MAX PREDICTED HEART RATE ACHIEVED: 37
OHS CV CPX RATE PRESSURE PRODUCT PRESENTING: 8778
OHS CV INITIAL DOSE: 35 MCG/KG/MIN
OHS CV MODERATELY REDUCED FLOW CAPACITY: 0 %
OHS CV NO ISCHEMIA MILDLY REDUCED FLOW CAPACTY: 54 %
OHS CV NO ISCHEMIA MINIMALLY REDUCED FLOW CAPACITY: 45 %
OHS CV NORMAL FLOW CAPACITY COMPARABLE TO HEALTHY YOUNG VOLUNTEERS: 1 %
OHS CV PEAK DOSE: 34.9 MCG/KG/MIN
OHS CV PET ID: 9335
OHS CV SEVERELY REDUCED FLOW CAPACITY: 0 %
OHS CV TOTAL EXAM DLP: 520.42 MGY-CM
REST FLOW - ANTERIOR: 0.52 CC/MIN/G
REST FLOW - INFERIOR: 0.51 CC/MIN/G
REST FLOW - LATERAL: 0.49 CC/MIN/G
REST FLOW - MAX: 0.68 CC/MIN/G
REST FLOW - MIN: 0.31 CC/MIN/G
REST FLOW - SEPTAL: 0.44 CC/MIN/G
REST FLOW - WHOLE HEART: 0.49 CC/MIN/G
RETIRED EF AND QEF - SEE NOTES: 53 %
STRESS FLOW - ANTERIOR: 1.19 CC/MIN/G
STRESS FLOW - INFERIOR: 1.26 CC/MIN/G
STRESS FLOW - LATERAL: 1.17 CC/MIN/G
STRESS FLOW - MAX: 1.71 CC/MIN/G
STRESS FLOW - MIN: 0.55 CC/MIN/G
STRESS FLOW - SEPTAL: 1.07 CC/MIN/G
STRESS FLOW - WHOLE HEART: 1.17 CC/MIN/G
SYSTOLIC BLOOD PRESSURE: 133 MMHG

## 2025-07-17 PROCEDURE — 63600175 PHARM REV CODE 636 W HCPCS: Performed by: INTERNAL MEDICINE

## 2025-07-17 PROCEDURE — A9555 RB82 RUBIDIUM: HCPCS | Performed by: INTERNAL MEDICINE

## 2025-07-17 PROCEDURE — 93016 CV STRESS TEST SUPVJ ONLY: CPT | Mod: ,,, | Performed by: INTERNAL MEDICINE

## 2025-07-17 PROCEDURE — 93018 CV STRESS TEST I&R ONLY: CPT | Mod: ,,, | Performed by: INTERNAL MEDICINE

## 2025-07-17 PROCEDURE — 78431 MYOCRD IMG PET RST&STRS CT: CPT | Mod: 26,,, | Performed by: INTERNAL MEDICINE

## 2025-07-17 PROCEDURE — 78434 AQMBF PET REST & RX STRESS: CPT | Mod: 26,,, | Performed by: INTERNAL MEDICINE

## 2025-07-17 PROCEDURE — 78431 MYOCRD IMG PET RST&STRS CT: CPT

## 2025-07-17 RX ORDER — REGADENOSON 0.08 MG/ML
0.4 INJECTION, SOLUTION INTRAVENOUS
Status: COMPLETED | OUTPATIENT
Start: 2025-07-17 | End: 2025-07-17

## 2025-07-17 RX ADMIN — RUBIDIUM CHLORIDE RB-82 34.9 MILLICURIE: 150 INJECTION, SOLUTION INTRAVENOUS at 07:07

## 2025-07-17 RX ADMIN — RUBIDIUM CHLORIDE RB-82 35 MILLICURIE: 150 INJECTION, SOLUTION INTRAVENOUS at 07:07

## 2025-07-17 RX ADMIN — REGADENOSON 0.4 MG: 0.08 INJECTION, SOLUTION INTRAVENOUS at 07:07

## 2025-07-18 ENCOUNTER — TELEPHONE (OUTPATIENT)
Dept: ENDOSCOPY | Facility: HOSPITAL | Age: 40
End: 2025-07-18
Payer: COMMERCIAL

## 2025-07-18 ENCOUNTER — E-CONSULT (OUTPATIENT)
Dept: CARDIOLOGY | Facility: CLINIC | Age: 40
End: 2025-07-18
Payer: COMMERCIAL

## 2025-07-18 DIAGNOSIS — Z01.810 PRE-OPERATIVE CARDIOVASCULAR EXAMINATION: Primary | ICD-10-CM

## 2025-07-18 NOTE — TELEPHONE ENCOUNTER
----- Message from Med Assistant Kathrin sent at 2025  1:10 PM CDT -----  Regardin/12 BT CL  The patient is currently under an external cardiologist, Shine Houser MD ,  care. Is patient okay to hold blood thinner Eliquis (apixaban) for their upcoming scheduled Upper Endoscopy (EGD) on 25.     Additional request(s) required:  external cardiologist, Shine Houser MD   medically optimized by Cardiology    Shine Houser MD    [facility] [place] [address]   [phone] (Work)   [phone] (Fax)         Notes: patient has an appointment on  paula Houser an appointment with EDI Dumont on

## 2025-07-18 NOTE — TELEPHONE ENCOUNTER
Department of Endoscopy      Dear Dr. Houser, Shine Greco  ,    Your patient, Vel Ordonez 1985 is scheduled for Upper Endoscopy (EGD) on 8/12/25  and our records indicate they are taking Eliquis (apixaban).    Please indicate if and when the patient can safely stop their medication prior to the procedure by completing one of the following:    Do not discontinue medication: _____    Medication can be discontinued _____ days prior to the procedure.    Please take into consideration that therapeutic maneuvers may be performed during the procedure that requires delay in restarting anticoagulation therapy.      Signature: ______________________________________________ Date:__________________      Please sign and date this letter and fax it to [phone]. If you have any questions or concerns, please call us at [phone] Monday-Friday, 8:00 am-3:00 pm.    Thank you for your prompt response,    [facility] Endoscopy Scheduling Department       Medication                                                                Hold Time                                                                                                                                                        ANTIPLATELETS   Persantine (dipyridamole) 2 days   Aggrenox (ASA/dipyridamole) 2 days     Pletal (cilostazol) 2 days     Plavix (clopidogrel) 5 days     Effient (prasugrel) 5 days     Ticlid (ticlidopine) 10 days     Brilinta (ticagrelor) 3 days     Zontivity (vorapaxar) 5 days   ANTICOAGULANTS   Coumadin (warfarin) 5 days   Lovenox (enoxaparin)  -last dose administered to be 50% of total daily dose 24 hours   Fragmin (dalteparin)   -last dose administered to be 50% of total daily dose 24 hours   Arixtra (fondaparinux) 2 days     Xarelto (rivaroxaban)   2 days     Eliquis (apixaban)   2 days     Savaysa (edoxaban)   2 days     Pradaxa (dabigatran)   2 days     Iprivask (desirudin)   10 hrs

## 2025-07-18 NOTE — CONSULTS
[facility] Cardiology [address]  Response for E-Consult     Patient Name: Vel Ordonez  MRN: [id number]  Primary Care Provider: Opal Ceja MD   Requesting Provider: Terri Trejo NP  E-Consult to General Cardiology  Consult performed by: Juan A Dumont III, MD  Consult ordered by: Terri Trejo NP        Recommendation: pre-procedural med mgmt - Endoscopy 8/12/25     Low risk endoscopic procedure. No unstable cardiac conditions. No further testing needed before procedure.  OK to hold eliquis for 48 hours prior to procedure, if needed.  Restart eliquis as soon as safely possible following the procedure.    Total time of Consultation: 5 minute    I did not speak to the requesting provider verbally about this.     *This eConsult is based on the clinical data available to me and is furnished without benefit of a physical examination. The eConsult will need to be interpreted in light of any clinical issues or changes in patient status not available to me at the time of filing this eConsults. Significant changes in patient condition or level of acuity should result in immediate formal consultation and reevaluation. Please alert me if you have further questions.    Thank you for this eConsult referral.     MD Alessandro Jones [facility] Cardiology [address]

## 2025-07-18 NOTE — TELEPHONE ENCOUNTER
E consult ordered.     Clearance request faxed to Shine Houser MD  . Fax number [phone] to hold Eliquis (apixaban) prior to Upper Endoscopy (EGD).

## 2025-07-20 ENCOUNTER — HOSPITAL ENCOUNTER (EMERGENCY)
Facility: HOSPITAL | Age: 40
Discharge: HOME | End: 2025-07-21
Attending: INTERNAL MEDICINE
Payer: COMMERCIAL

## 2025-07-20 ENCOUNTER — ON-DEMAND VIRTUAL (OUTPATIENT)
Dept: URGENT CARE | Facility: CLINIC | Age: 40
End: 2025-07-20
Payer: COMMERCIAL

## 2025-07-20 DIAGNOSIS — R73.9 HYPERGLYCEMIA: Primary | ICD-10-CM

## 2025-07-20 DIAGNOSIS — E11.65 UNCONTROLLED TYPE 2 DIABETES MELLITUS WITH HYPERGLYCEMIA: ICD-10-CM

## 2025-07-20 DIAGNOSIS — E11.65 TYPE 2 DIABETES MELLITUS WITH HYPERGLYCEMIA, WITHOUT LONG-TERM CURRENT USE OF INSULIN: Primary | ICD-10-CM

## 2025-07-20 DIAGNOSIS — I95.9 HYPOTENSION, UNSPECIFIED HYPOTENSION TYPE: ICD-10-CM

## 2025-07-20 LAB
ALLENS TEST: ABNORMAL
HCO3 UR-SCNC: 24.8 MMOL/L (ref 24–28)
LDH SERPL L TO P-CCNC: 1.73 MMOL/L (ref 0.5–2.2)
PCO2 BLDA: 45.3 MMHG (ref 35–45)
PH SMN: 7.35 [PH] (ref 7.35–7.45)
PO2 BLDA: 55 MMHG (ref 40–60)
POC BE: -1 MMOL/L (ref -2–2)
POC SATURATED O2: 86 % (ref 95–100)
POC TCO2: 26 MMOL/L (ref 24–29)
POCT GLUCOSE: 246 MG/DL (ref 70–110)
SAMPLE: ABNORMAL
SITE: ABNORMAL

## 2025-07-20 PROCEDURE — 98006 SYNCH AUDIO-VIDEO EST MOD 30: CPT | Mod: 95,,, | Performed by: NURSE PRACTITIONER

## 2025-07-20 PROCEDURE — 83605 ASSAY OF LACTIC ACID: CPT | Mod: ER

## 2025-07-20 PROCEDURE — 25000003 PHARM REV CODE 250: Mod: ER | Performed by: INTERNAL MEDICINE

## 2025-07-20 PROCEDURE — 96360 HYDRATION IV INFUSION INIT: CPT | Mod: ER

## 2025-07-20 PROCEDURE — 80053 COMPREHEN METABOLIC PANEL: CPT | Mod: ER

## 2025-07-20 PROCEDURE — 82962 GLUCOSE BLOOD TEST: CPT | Mod: ER

## 2025-07-20 PROCEDURE — 99284 EMERGENCY DEPT VISIT MOD MDM: CPT | Mod: 25,ER

## 2025-07-20 RX ORDER — CALCIUM CARB/VITAMIN D3/VIT K1 500-100-40
1 TABLET,CHEWABLE ORAL 3 TIMES DAILY
Qty: 100 EACH | Refills: 0 | Status: SHIPPED | OUTPATIENT
Start: 2025-07-20

## 2025-07-20 RX ADMIN — SODIUM CHLORIDE 1000 ML: 9 INJECTION, SOLUTION INTRAVENOUS at 11:07

## 2025-07-21 ENCOUNTER — PATIENT MESSAGE (OUTPATIENT)
Dept: CARDIOLOGY | Facility: CLINIC | Age: 40
End: 2025-07-21
Payer: COMMERCIAL

## 2025-07-21 VITALS
SYSTOLIC BLOOD PRESSURE: 127 MMHG | DIASTOLIC BLOOD PRESSURE: 72 MMHG | BODY MASS INDEX: 30.44 KG/M2 | HEART RATE: 71 BPM | WEIGHT: 250 LBS | HEIGHT: 76 IN | TEMPERATURE: 98 F | OXYGEN SATURATION: 97 % | RESPIRATION RATE: 19 BRPM

## 2025-07-21 PROBLEM — E11.65 UNCONTROLLED TYPE 2 DIABETES MELLITUS WITH HYPERGLYCEMIA: Status: ACTIVE | Noted: 2025-07-21

## 2025-07-21 PROBLEM — R73.9 HYPERGLYCEMIA: Status: ACTIVE | Noted: 2025-07-21

## 2025-07-21 LAB
ALBUMIN SERPL-MCNC: 3.3 G/DL (ref 3.3–5.5)
ALP SERPL-CCNC: 88 U/L (ref 42–141)
BILIRUB SERPL-MCNC: 0.6 MG/DL (ref 0.2–1.6)
BUN SERPL-MCNC: 15 MG/DL (ref 7–22)
CALCIUM SERPL-MCNC: 9.3 MG/DL (ref 8–10.3)
CHLORIDE SERPL-SCNC: 109 MMOL/L (ref 98–108)
CREAT SERPL-MCNC: 1 MG/DL (ref 0.6–1.2)
GLUCOSE SERPL-MCNC: 282 MG/DL (ref 73–118)
HCT, POC: NORMAL
HGB, POC: NORMAL (ref 14–18)
MCH, POC: NORMAL
MCHC, POC: NORMAL
MCV, POC: NORMAL
MPV, POC: NORMAL
POC ALT (SGPT): 163 U/L (ref 10–47)
POC AST (SGOT): 88 U/L (ref 11–38)
POC PLATELET COUNT: NORMAL
POC TCO2: 28 MMOL/L (ref 18–33)
POTASSIUM BLD-SCNC: 4.6 MMOL/L (ref 3.6–5.1)
PROTEIN, POC: 6.7 G/DL (ref 6.4–8.1)
RBC, POC: NORMAL
RDW, POC: NORMAL
SODIUM BLD-SCNC: 141 MMOL/L (ref 128–145)
WBC, POC: NORMAL

## 2025-07-21 PROCEDURE — 82803 BLOOD GASES ANY COMBINATION: CPT | Mod: ER

## 2025-07-21 PROCEDURE — 85025 COMPLETE CBC W/AUTO DIFF WBC: CPT | Mod: ER

## 2025-07-21 NOTE — PROGRESS NOTES
"Subjective:      Patient ID: Vel Ordonez is a 39 y.o. male.    Vitals:  vitals were not taken for this visit.     Chief Complaint: Hyperglycemia      Visit Type: TELE AUDIOVISUAL    Past Medical History:   Diagnosis Date    Anxiety disorder 2022    Atrial flutter     Bipolar 1 disorder     Depression     Diabetes mellitus     Diabetes mellitus, type 2     Hx of psychiatric care     Hypertension     Psychiatric problem     Supraventricular tachycardia     Therapy      Past Surgical History:   Procedure Laterality Date    ABLATION N/A 2018    Procedure: ABLATION;  Surgeon: Demian Davis MD;  Location: [facility] CATH LAB;  Service: Cardiology;  Laterality: N/A;  SVT, RFA, CHELSIE, MAC, DM, 3 PREP    SLEEP APNEA MONITOR INSERTION       Review of patient's allergies indicates:  No Known Allergies  Medications Ordered Prior to Encounter[1]  Family History   Problem Relation Name Age of Onset    Cancer Mother          breast    Stroke Father      Heart disease Father      Diabetes Father         Medications Ordered                [facility] [id number] [place] [address] AT [address] [address]    Telephone: [phone]   Fax: [phone]   Hours: Not open 24 hours                         E-Prescribed (1 of 1)              insulin syringe-needle U-100 0.3 mL 31 gauge x 5/16" Syrg    Si each by Misc.(Non-Drug; Combo Route) route 3 (three) times daily.       Start: 25     Quantity: 100 each Refills: 0                           Ohs Peq Odvv Intake    2025  9:40 PM CDT - Filed by Patient   What is your current physical address in the event of a medical emergency? [address]   Are you able to take your vital signs? Yes   Systolic Blood Pressure: 98   Diastolic Blood Pressure: 54   Weight: 256   Height: 64   Pulse: 84   Temperature:    Respiration rate:    Pulse Oxygen:    Please attach any relevant images or files  "   Is your employer contracted with [facility]? No         Presents with c/o mild hypotension and elevated glucose x 3 days.  Glucose has consistently remained above 300.  Denies recent illness, no N/V/D or fever.  Stopped BP meds about a week ago after visit to ED for syncope.  He is asymptomatic with his BP currently.  Has appt with his MD Tuesday.  She was not able to see him sooner.  He has never taken insulin himself, but says he has administered to his parents and is comfortable measuring and administering.  Wears Dexcom. On ozempic.    Two patient identifiers were used-name was repeated verbally as well as date of birth.  The patient was located in their home in the [facility].          Constitution: Negative for chills and fever.   HENT:  Negative for ear pain, ear discharge, postnasal drip, sore throat and trouble swallowing.    Neck: Negative for neck pain, neck stiffness and painful lymph nodes.   Cardiovascular:  Negative for chest pain and palpitations.   Eyes:  Negative for eye itching and eyelid swelling.   Respiratory:  Negative for cough and shortness of breath.    Gastrointestinal:  Negative for nausea and vomiting.   Endocrine: cold intolerance and heat intolerance.   Genitourinary:  Negative for dysuria and frequency.   Musculoskeletal:  Negative for joint pain and muscle ache.   Skin:  Negative for color change and rash.   Allergic/Immunologic: Negative for itching and sneezing.   Neurological:  Negative for dizziness, headaches and altered mental status.   Hematologic/Lymphatic: Negative for swollen lymph nodes and easy bruising/bleeding. Does not bruise/bleed easily.   Psychiatric/Behavioral:  Negative for altered mental status and confusion.         Objective:   The physical exam was conducted virtually.  Physical Exam   Constitutional: He is oriented to person, place, and time. No distress.   HENT:   Head: Normocephalic and atraumatic.   Mouth/Throat: Abnormal dentition.  "  Neck: Neck supple.   Pulmonary/Chest: Effort normal. No respiratory distress.   Abdominal: Normal appearance.   Musculoskeletal: Normal range of motion.         General: Normal range of motion.   Neurological: no focal deficit. He is alert and oriented to person, place, and time.   Skin: Skin is not pale.   Psychiatric: His behavior is normal. Mood, judgment and thought content normal.       Assessment:     1. Type 2 diabetes mellitus with hyperglycemia, without long-term current use of insulin    2. Hypotension, unspecified hypotension type        Plan:       Type 2 diabetes mellitus with hyperglycemia, without long-term current use of insulin    Hypotension, unspecified hypotension type    Other orders  -     insulin aspart (NovoLOG) injection (CONC:10 units/mL) 2-10 Units  -     insulin syringe-needle U-100 0.3 mL 31 gauge x 5/16" Syrg; 1 each by Misc.(Non-Drug; Combo Route) route 3 (three) times daily.  Dispense: 100 each; Refill: 0    Low dose sliding scale insulin (aspart); check blood glucose 15 min prior to meals and administer appropriate insulin:    Blood sugar    insulin dose    <70                hypoglycemia protocol      0 units    131-180 2 units    181-240 4 units    241-300 6 units    301-350- 8 units    351-400 10 units    >400  12 units, call MD    Follow up with PCP as scheduled.  To ER if symptoms worsen.  SAMMIE hose or compression socks if able to obtain to help keep BP normal.  Stay well hydrated.        Thank you for choosing [facility]!     Our goal in the [facility] is to always provide outstanding medical care. You may receive a survey by mail or e-mail in the next week regarding your experience today. We would greatly appreciate you completing and returning the survey. Your feedback provides us with a way to recognize our staff who provide very good care, and it helps us learn how to improve when your experience was below our aspiration of " excellence.          We appreciate you trusting us with your medical care. We hope you feel better soon. We will be happy to take care of you for all of your future medical needs.     You must understand that you've received an Urgent Care treatment only and that you may be released before all your medical problems are known or treated. You, the patient, will arrange for follow up care as instructed.     Follow up with your PCP or specialty clinic as directed in the next 1-2 weeks if not improved or as needed.  You can call [phone] to schedule an appointment with the appropriate provider.     If your condition worsens we recommend that you receive another evaluation in person, with your primary care provider, urgent care or at the emergency room immediately or contact your primary medical clinics after hours call service to discuss your concerns.    Time spent on call 36 minutes.        Present with the patient at the time of consultation: TELEMED PRESENT WITH PATIENT: None         [1]   Current Outpatient Medications on File Prior to Visit   Medication Sig Dispense Refill    apixaban (ELIQUIS) 5 mg Tab Take 1 tablet (5 mg total) by mouth 2 (two) times daily. (Patient not taking: Reported on 7/3/2025) 180 tablet 1    atorvastatin (LIPITOR) 80 MG tablet Take 1 tablet (80 mg total) by mouth once daily. 90 tablet 3    blood sugar diagnostic Strp 1 strip by Other route.      blood-glucose sensor (DEXCOM G7 SENSOR) Martha 1 Device by Misc.(Non-Drug; Combo Route) route Daily. 5 each 6    divalproex ER (DEPAKOTE ER) 500 MG Tb24 24 hr tablet Take 1 tablet (500 mg total) by mouth 3 (three) times daily. 270 tablet 3    flecainide (TAMBOCOR) 50 MG Tab Take 1 tablet by mouth 2 (two) times daily.      FLUoxetine 40 MG capsule Take 1 capsule (40 mg total) by mouth once daily. 90 capsule 3    OLANZapine (ZYPREXA) 5 MG tablet Take 1 tablet (5 mg total) by mouth every evening. 90 tablet 3    OZEMPIC 1 mg/dose (4 mg/3 mL) Inject  1 mg into the skin every 7 days. 3 mL 3    pantoprazole (PROTONIX) 40 MG tablet Take 1 tablet (40 mg total) by mouth once daily. 90 tablet 3    [DISCONTINUED] ARIPiprazole (ABILIFY) 5 MG Tab Take 1 tablet (5 mg total) by mouth once daily. 90 tablet 1     No current facility-administered medications on file prior to visit.

## 2025-07-21 NOTE — PATIENT INSTRUCTIONS
Low dose sliding scale insulin (aspart); check your blood glucose 3 times daily 15 minutes prior to meals and administer necessary coverage as follows:     Blood sugar    insulin dose    <70                hypoglycemia protocol      0 units    131-180 2 units    181-240 4 units    241-300 6 units    301-350- 8 units    351-400 10 units    >400  12 units, call MD

## 2025-07-21 NOTE — ED PROVIDER NOTES
Encounter Date: 7/20/2025       History     Chief Complaint   Patient presents with    Hyperglycemia     Pt never got a prescription for insulin from [facility] so came to ED for prescription. CBG in triage 246.      39-year-old male with a past medical history significant for diabetes mellitus type 2 presents to emergency department complaining of elevated glucose (greater than 300) earlier today.  States he spoke with provider on-call who attempted to call in insulin to his pharmacy, but states prescription was not called in correctly.  Denies fever/chills/nausea/vomiting/chest pain/shortness breath.    The history is provided by the patient. No  was used.     Review of patient's allergies indicates:  No Known Allergies  Past Medical History:   Diagnosis Date    Anxiety disorder 07/03/2022    Atrial flutter     Bipolar 1 disorder 2011    Depression     Diabetes mellitus     Diabetes mellitus, type 2     Hx of psychiatric care     Hypertension     Psychiatric problem     Supraventricular tachycardia     Therapy      Past Surgical History:   Procedure Laterality Date    ABLATION N/A 09/17/2018    Procedure: ABLATION;  Surgeon: Demian Davis MD;  Location: [facility] CATH LAB;  Service: Cardiology;  Laterality: N/A;  SVT, RFA, CHELSIE, MAC, DM, 3 PREP    SLEEP APNEA MONITOR INSERTION       Family History   Problem Relation Name Age of Onset    Cancer Mother          breast    Stroke Father      Heart disease Father      Diabetes Father       Social History[1]  Review of Systems   Constitutional:  Negative for chills and fever.   Respiratory:  Negative for shortness of breath.    Cardiovascular:  Negative for chest pain.   Gastrointestinal:  Negative for abdominal pain, constipation, diarrhea, nausea and vomiting.   Endocrine: Negative for polydipsia, polyphagia and polyuria.   All other systems reviewed and are negative.      Physical Exam     Initial Vitals [07/20/25 2314]   BP Pulse Resp Temp SpO2    123/82 79 18 97.7 °F (36.5 °C) 98 %      MAP       --         Physical Exam    Nursing note and vitals reviewed.  Constitutional: He is not diaphoretic. No distress.   HENT:   Head: Normocephalic and atraumatic.   Right Ear: External ear normal.   Left Ear: External ear normal.   Eyes: Conjunctivae are normal.   Neck:   Normal range of motion.  Cardiovascular:  Normal rate and regular rhythm.           Pulmonary/Chest: Breath sounds normal. No respiratory distress.   Abdominal: Abdomen is soft. Bowel sounds are normal. There is no abdominal tenderness.   Musculoskeletal:      Cervical back: Normal range of motion.     Neurological: He is alert. He has normal strength.   Skin: Skin is warm and dry. Capillary refill takes less than 2 seconds.   Psychiatric: He has a normal mood and affect.         ED Course   Procedures  Labs Reviewed   POCT GLUCOSE - Abnormal       Result Value    POCT Glucose 246 (*)    ISTAT PROCEDURE - Abnormal    POC PH 7.346 (*)     POC PCO2 45.3 (*)     POC PO2 55      POC HCO3 24.8      POC BE -1      POC SATURATED O2 86      POC Lactate 1.73      POC TCO2 26      Sample VENOUS      Site Other      Allens Test N/A     POCT CMP - Abnormal    Albumin, POC 3.3      Alkaline Phosphatase, POC 88      ALT (SGPT),  (*)     AST (SGOT), POC 88 (*)     POC BUN 15      Calcium, POC 9.3      POC Chloride 109 (*)     POC Creatinine 1.0      POC Glucose 282 (*)     POC Potassium 4.6      POC Sodium 141      Bilirubin, POC 0.6      POC TCO2 28      Protein, POC 6.7     POCT CBC    Hematocrit        Hemoglobin        RBC        WBC        MCV        MCH, POC        MCHC        RDW-CV        Platelet Count, POC        MPV       POCT CMP          Imaging Results    None          Medications   sodium chloride 0.9% bolus 1,000 mL 1,000 mL (0 mLs Intravenous Stopped 7/21/25 0053)     Medical Decision Making  39-year-old male with a past medical history significant for diabetes mellitus type 2 presents to  emergency department complaining of elevated glucose (greater than 300) earlier today.  States he spoke with provider on-call who attempted to call in insulin to his pharmacy, but states prescription was not called in correctly.  Denies fever/chills/nausea/vomiting/chest pain/shortness breath.  Course of ED stay:   Glucose was less than 300.  CBC/CMP were reassuring.  Normal saline bolus was given.  Patient was given instructions for uncontrolled diabetes mellitus type 2 and advised to follow with his primary care physician within the next week for re-evaluation/return to the emergency department if condition worsens.    Amount and/or Complexity of Data Reviewed  Labs: ordered.                                          Clinical Impression:  Final diagnoses:  [R73.9] Hyperglycemia (Primary)  [E11.65] Uncontrolled type 2 diabetes mellitus with hyperglycemia          ED Disposition Condition    Discharge Stable          ED Prescriptions    None       Follow-up Information       Follow up With Specialties Details Why Contact Info    Opal Ceja MD Family Medicine Schedule an appointment as soon as possible for a visit in 1 week For reevaluation [address]  [place] [address]  [phone]                     [1]   Social History  Tobacco Use    Smoking status: Never    Smokeless tobacco: Never   Substance Use Topics    Alcohol use: Yes     Comment: rare    Drug use: No        Bigg Calzada MD  07/21/25 0334

## 2025-07-22 ENCOUNTER — TELEPHONE (OUTPATIENT)
Dept: PRIMARY CARE CLINIC | Facility: CLINIC | Age: 40
End: 2025-07-22
Payer: COMMERCIAL

## 2025-07-22 ENCOUNTER — OFFICE VISIT (OUTPATIENT)
Dept: PRIMARY CARE CLINIC | Facility: CLINIC | Age: 40
End: 2025-07-22
Payer: COMMERCIAL

## 2025-07-22 ENCOUNTER — TELEPHONE (OUTPATIENT)
Dept: PRIMARY CARE CLINIC | Facility: CLINIC | Age: 40
End: 2025-07-22

## 2025-07-22 VITALS
BODY MASS INDEX: 32.78 KG/M2 | RESPIRATION RATE: 14 BRPM | HEART RATE: 83 BPM | SYSTOLIC BLOOD PRESSURE: 118 MMHG | WEIGHT: 269.19 LBS | HEIGHT: 76 IN | DIASTOLIC BLOOD PRESSURE: 68 MMHG | OXYGEN SATURATION: 96 %

## 2025-07-22 DIAGNOSIS — I10 ESSENTIAL HYPERTENSION: ICD-10-CM

## 2025-07-22 DIAGNOSIS — E11.9 TYPE 2 DIABETES MELLITUS WITHOUT COMPLICATION, WITHOUT LONG-TERM CURRENT USE OF INSULIN: Primary | ICD-10-CM

## 2025-07-22 DIAGNOSIS — I48.0 PAROXYSMAL A-FIB: ICD-10-CM

## 2025-07-22 DIAGNOSIS — M79.672 LEFT FOOT PAIN: ICD-10-CM

## 2025-07-22 DIAGNOSIS — E66.09 CLASS 1 OBESITY DUE TO EXCESS CALORIES WITH SERIOUS COMORBIDITY AND BODY MASS INDEX (BMI) OF 32.0 TO 32.9 IN ADULT: ICD-10-CM

## 2025-07-22 DIAGNOSIS — Z86.79 S/P ABLATION OPERATION FOR ARRHYTHMIA: Primary | ICD-10-CM

## 2025-07-22 DIAGNOSIS — R20.2 PINS AND NEEDLES SENSATION: ICD-10-CM

## 2025-07-22 DIAGNOSIS — Z86.79 S/P ABLATION OPERATION FOR ARRHYTHMIA: ICD-10-CM

## 2025-07-22 DIAGNOSIS — F31.9 BIPOLAR 1 DISORDER: ICD-10-CM

## 2025-07-22 DIAGNOSIS — Z98.890 S/P ABLATION OPERATION FOR ARRHYTHMIA: ICD-10-CM

## 2025-07-22 DIAGNOSIS — Z96.82 S/P PLACEMENT OF HYPOGLOSSAL NERVE STIMULATOR: ICD-10-CM

## 2025-07-22 DIAGNOSIS — E66.811 CLASS 1 OBESITY DUE TO EXCESS CALORIES WITH SERIOUS COMORBIDITY AND BODY MASS INDEX (BMI) OF 32.0 TO 32.9 IN ADULT: ICD-10-CM

## 2025-07-22 DIAGNOSIS — Z98.890 S/P ABLATION OPERATION FOR ARRHYTHMIA: Primary | ICD-10-CM

## 2025-07-22 PROCEDURE — 99999 PR PBB SHADOW E&M-EST. PATIENT-LVL IV: CPT | Mod: PBBFAC,,, | Performed by: FAMILY MEDICINE

## 2025-07-22 PROCEDURE — 99215 OFFICE O/P EST HI 40 MIN: CPT | Mod: S$GLB,,, | Performed by: FAMILY MEDICINE

## 2025-07-22 RX ORDER — LISINOPRIL 40 MG/1
40 TABLET ORAL
COMMUNITY
Start: 2025-07-14 | End: 2025-07-22

## 2025-07-22 RX ORDER — AMLODIPINE BESYLATE 5 MG/1
5 TABLET ORAL
COMMUNITY
Start: 2025-07-20 | End: 2025-07-22

## 2025-07-22 RX ORDER — PREGABALIN 50 MG/1
50 CAPSULE ORAL 3 TIMES DAILY
Qty: 90 CAPSULE | Refills: 0 | Status: SHIPPED | OUTPATIENT
Start: 2025-07-22 | End: 2026-01-20

## 2025-07-22 RX ORDER — GABAPENTIN 100 MG/1
100 CAPSULE ORAL 3 TIMES DAILY
COMMUNITY
Start: 2025-07-17 | End: 2025-07-22

## 2025-07-22 RX ORDER — METOPROLOL SUCCINATE 25 MG/1
25 TABLET, EXTENDED RELEASE ORAL DAILY
Qty: 90 TABLET | Refills: 3 | Status: SHIPPED | OUTPATIENT
Start: 2025-07-22

## 2025-07-22 RX ORDER — SEMAGLUTIDE 2.68 MG/ML
2 INJECTION, SOLUTION SUBCUTANEOUS
Qty: 3 ML | Refills: 3 | Status: SHIPPED | OUTPATIENT
Start: 2025-07-22 | End: 2026-07-22

## 2025-07-22 RX ORDER — METOPROLOL SUCCINATE 25 MG/1
25 TABLET, EXTENDED RELEASE ORAL
COMMUNITY
Start: 2025-07-21 | End: 2025-07-22

## 2025-07-22 NOTE — PROGRESS NOTES
"Subjective:       Patient ID: Vel Ordonez is a 39 y.o. male.    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    Home glucose has been labile x 2 weeks, ranges , average 150, he is on Ozempic 1 mg weekly, he notes he has been more hungry lately and is eating more, he is eating 3 meals a day, he is picky but has some protein some carbs and has fruit like an apple, his lows are in the afternoon. His weight is up 10 pounds since June 3rd. Since last visit he was seen by psychiatry and Zyprexa 5 mg daily was added to his regimen. He is still on Protonix for his dyphagia but does not feel its helped, has EGD planned 8/12/25.    He was in ER on 6/26/25 with hypotension and all of his "blood pressure" medications were stopped, he is no longer on Amlodipine, Lisinopril or metoprolol succinate, home bp running /50-90's. Since then he is feeling better overall, still having some dizziness, occurs 3 times a day, feels like his heart is racing when this happens, home bp before other meds is 130-140's/80's then after meds he is running 90's/50s despite being off his bp medication. He is staying hydrated.    Since last visit he completed cardiac event monitor that was reassuring, echo with EF 48% and PET stress was ok, he was seen by cardiology, no med changes, however bp meds were stopped since then and cardiology not aware, we discussed sending staff message; Eliquis was discussed as well, it was started by [facility] cardiology about 3 years ago, I presume for pafib will request records.     He was in ER on 7/11 for syncopal episode, EKG, CT head, CXR reassuring, labs ok     He has L foot pain off and on for 6 weeks, he feels like "he is stepping on needles", occurs somedays and not others, has podiatry appt set, xray 5/2025 mild DJD and " "calcaneal spur, he tried Gabapentin TID he went up to 300 mg and it did not help.     DM2/Class 1 obesity BMI 32: never been to endocrine, diagnosed about 15 month ago A1c 6.6 4/2024 , Ozempic 1 mg weekly, A1c 5.8 6/2025  SVT s/p AVNRT 2018/pAfib/HTN/HLPD/low HDL:Eliquis 5 mg bid, Flecainide 50 mg twice daily,  off lisinopril, off Lopressor, off amlodipine Family hx of early CAD in father at age 31  ROJELIO s/p hypoglossal nerve stimulator/dysphagia: following with ENT at [facility] Dr. Meneses  Dysphagia: following with GI, pantoprazole 40 mg daily, has EGD plan  CLBP  Bipolar 1 d/o/PERLA: following with psychiatry; hx of hospitalization for SI a few years ago; Valium prn only; Depakote  mg daily, Prozac 40 mg daily  Eye exam due  Dental utd     Review of Systems    Objective:      /68 (Patient Position: Sitting)   Pulse 83   Resp 14   Ht 6' 4" (1.93 m)   Wt 122.1 kg (269 lb 2.9 oz)   SpO2 96%   BMI 32.77 kg/m²   Physical Exam  Vitals and nursing note reviewed.   Constitutional:       Appearance: He is well-developed.   HENT:      Head: Normocephalic and atraumatic.      Mouth/Throat:      Pharynx: No oropharyngeal exudate or posterior oropharyngeal erythema.   Neck:      Thyroid: No thyromegaly.   Cardiovascular:      Rate and Rhythm: Normal rate and regular rhythm.      Heart sounds: Normal heart sounds.   Pulmonary:      Effort: Pulmonary effort is normal. No respiratory distress.      Breath sounds: Normal breath sounds.   Musculoskeletal:      Cervical back: Normal range of motion and neck supple.      Right lower leg: No edema.      Left lower leg: No edema.   Lymphadenopathy:      Cervical: No cervical adenopathy.   Skin:     General: Skin is warm and dry.   Neurological:      Mental Status: He is alert.         Assessment:       1. Type 2 diabetes mellitus without complication, without long-term current use of insulin    2. Paroxysmal A-fib    3. S/P ablation operation for arrhythmia    4. " "Essential hypertension    5. S/P placement of hypoglossal nerve stimulator    6. Bipolar 1 disorder    7. Class 1 obesity due to excess calories with serious comorbidity and body mass index (BMI) of 32.0 to 32.9 in adult    8. Pins and needles sensation    9. Left foot pain        Plan:   Vel Melissa" was seen today for diabetes and follow-up.    Diagnoses and all orders for this visit:    Type 2 diabetes mellitus without complication, without long-term current use of insulin  -     semaglutide (OZEMPIC) 2 mg/dose (8 mg/3 mL) PnIj; Inject 2 mg into the skin every 7 days.    Paroxysmal A-fib    S/P ablation operation for arrhythmia    Essential hypertension  -     semaglutide (OZEMPIC) 2 mg/dose (8 mg/3 mL) PnIj; Inject 2 mg into the skin every 7 days.    S/P placement of hypoglossal nerve stimulator    Bipolar 1 disorder    Class 1 obesity due to excess calories with serious comorbidity and body mass index (BMI) of 32.0 to 32.9 in adult  -     semaglutide (OZEMPIC) 2 mg/dose (8 mg/3 mL) PnIj; Inject 2 mg into the skin every 7 days.    Pins and needles sensation  -     pregabalin (LYRICA) 50 MG capsule; Take 1 capsule (50 mg total) by mouth 3 (three) times daily.    Left foot pain  -     pregabalin (LYRICA) 50 MG capsule; Take 1 capsule (50 mg total) by mouth 3 (three) times daily.      Discussed that Zyprexa could be a contributor to his labile blood sugars and weight gain, secure chat was sent to psychiatry    Time spent with this patient was 45 minutes.  Patient was counseled for over 50% of the visit.    "

## 2025-07-22 NOTE — LETTER
AUTHORIZATION FOR RELEASE OF   CONFIDENTIAL INFORMATION    Dear Dr. Shine Houser,    We are seeing Vel Ordonez, date of birth 1985, in the clinic at [facility]. Opal Ceja MD is the patient's PCP. Vel Ordonez has an outstanding lab/procedure at the time we reviewed his chart. In order to help keep his health information updated, he has authorized us to request the following medical record(s):        (  )  MAMMOGRAM                                      (  )  COLONOSCOPY      (  )  PAP SMEAR                                          (  )  OUTSIDE LAB RESULTS     (  )  DEXA SCAN                                          (  )  EYE EXAM            (  )  FOOT EXAM                                          (  )  ENTIRE RECORD     (  )  OUTSIDE IMMUNIZATIONS                 ( X )  MOST RECENT VISIT NOTE         Please fax records to [facility], Jones, Rebecca, MD, [phone]     If you have any questions, please contact Fani at [phone].           Patient Name: Vel Ordonez  : 1985  Patient Phone #: [phone]

## 2025-07-22 NOTE — TELEPHONE ENCOUNTER
Please let him know I heard back from his Cardiologist Dr. Dumont and he recommended adding back the Metoprolol Succinate 25 mg daily. Please send us a log in 2 weeks.     He also mentioned that reiterated the importance of staying hydrated, wearing compression socks

## 2025-07-22 NOTE — PATIENT INSTRUCTIONS
Add an afternoon snack to prevent low glucose    See if you can stop Pantoprazole since it has not helped the dysphagia    Move Lipitor (atorvastatin )and Fluoxetine (Prozac) to the evening

## 2025-07-22 NOTE — PROGRESS NOTES
Pt arrived in clinic today and brought at home b/p cuff:    Manual B/P Cuff:   114/74      At home B/P Cuff Readin/70

## 2025-08-07 ENCOUNTER — PATIENT MESSAGE (OUTPATIENT)
Dept: PRIMARY CARE CLINIC | Facility: CLINIC | Age: 40
End: 2025-08-07
Payer: COMMERCIAL

## 2025-08-08 ENCOUNTER — HOSPITAL ENCOUNTER (EMERGENCY)
Facility: HOSPITAL | Age: 40
Discharge: HOME | End: 2025-08-08
Attending: INTERNAL MEDICINE
Payer: COMMERCIAL

## 2025-08-08 VITALS
HEIGHT: 76 IN | TEMPERATURE: 98 F | WEIGHT: 260 LBS | HEART RATE: 71 BPM | SYSTOLIC BLOOD PRESSURE: 141 MMHG | DIASTOLIC BLOOD PRESSURE: 89 MMHG | RESPIRATION RATE: 18 BRPM | OXYGEN SATURATION: 96 % | BODY MASS INDEX: 31.66 KG/M2

## 2025-08-08 DIAGNOSIS — M79.672 LEFT FOOT PAIN: Primary | ICD-10-CM

## 2025-08-08 PROCEDURE — 25000003 PHARM REV CODE 250: Mod: ER | Performed by: INTERNAL MEDICINE

## 2025-08-08 PROCEDURE — 99283 EMERGENCY DEPT VISIT LOW MDM: CPT | Mod: ER

## 2025-08-08 RX ORDER — IBUPROFEN 800 MG/1
800 TABLET, FILM COATED ORAL EVERY 8 HOURS PRN
Qty: 30 TABLET | Refills: 0 | Status: SHIPPED | OUTPATIENT
Start: 2025-08-08

## 2025-08-08 RX ORDER — IBUPROFEN 400 MG/1
800 TABLET, FILM COATED ORAL
Status: COMPLETED | OUTPATIENT
Start: 2025-08-08 | End: 2025-08-08

## 2025-08-08 RX ADMIN — IBUPROFEN 800 MG: 400 TABLET ORAL at 10:08

## 2025-08-08 NOTE — TELEPHONE ENCOUNTER
" LOV with Opal Ceja MD , 7/22/2025    At recent visit, pt c/o L foot pain. PCP notes read:  He has L foot pain off and on for 6 weeks, he feels like "he is stepping on needles", occurs somedays and not others, has podiatry appt set, xray 5/2025 mild DJD and calcaneal spur, he tried Gabapentin TID he went up to 300 mg and it did not help. Pt was switched to Lyrica for L foot pain.    Pt messaging today stating his left foot is still hurting, states he has had no relief with the Lyrica. Pt is requesting PCP order a MRI, unsure which MRI of left foot to pend  "

## 2025-08-11 ENCOUNTER — ANESTHESIA EVENT (OUTPATIENT)
Dept: ENDOSCOPY | Facility: HOSPITAL | Age: 40
End: 2025-08-11
Payer: COMMERCIAL

## 2025-08-12 ENCOUNTER — ANESTHESIA (OUTPATIENT)
Dept: ENDOSCOPY | Facility: HOSPITAL | Age: 40
End: 2025-08-12
Payer: COMMERCIAL

## 2025-08-12 ENCOUNTER — HOSPITAL ENCOUNTER (OUTPATIENT)
Facility: HOSPITAL | Age: 40
Discharge: HOME | End: 2025-08-12
Attending: INTERNAL MEDICINE | Admitting: INTERNAL MEDICINE
Payer: COMMERCIAL

## 2025-08-12 VITALS
OXYGEN SATURATION: 99 % | SYSTOLIC BLOOD PRESSURE: 142 MMHG | TEMPERATURE: 98 F | RESPIRATION RATE: 16 BRPM | HEART RATE: 64 BPM | DIASTOLIC BLOOD PRESSURE: 92 MMHG

## 2025-08-12 DIAGNOSIS — K21.9 GERD (GASTROESOPHAGEAL REFLUX DISEASE): ICD-10-CM

## 2025-08-12 DIAGNOSIS — K21.9 GASTROESOPHAGEAL REFLUX DISEASE, UNSPECIFIED WHETHER ESOPHAGITIS PRESENT: Primary | ICD-10-CM

## 2025-08-12 LAB — POCT GLUCOSE: 112 MG/DL (ref 70–110)

## 2025-08-12 PROCEDURE — 43235 EGD DIAGNOSTIC BRUSH WASH: CPT | Mod: 53,,, | Performed by: INTERNAL MEDICINE

## 2025-08-12 PROCEDURE — 37000009 HC ANESTHESIA EA ADD 15 MINS: Performed by: INTERNAL MEDICINE

## 2025-08-12 PROCEDURE — 43235 EGD DIAGNOSTIC BRUSH WASH: CPT | Mod: 74 | Performed by: INTERNAL MEDICINE

## 2025-08-12 PROCEDURE — 37000008 HC ANESTHESIA 1ST 15 MINUTES: Performed by: INTERNAL MEDICINE

## 2025-08-12 PROCEDURE — 25000003 PHARM REV CODE 250: Performed by: ANESTHESIOLOGY

## 2025-08-12 PROCEDURE — 63600175 PHARM REV CODE 636 W HCPCS: Performed by: NURSE ANESTHETIST, CERTIFIED REGISTERED

## 2025-08-12 RX ORDER — LIDOCAINE HYDROCHLORIDE 20 MG/ML
INJECTION, SOLUTION EPIDURAL; INFILTRATION; INTRACAUDAL; PERINEURAL
Status: DISCONTINUED
Start: 2025-08-12 | End: 2025-08-12 | Stop reason: HOSPADM

## 2025-08-12 RX ORDER — PROPOFOL 10 MG/ML
VIAL (ML) INTRAVENOUS
Status: COMPLETED
Start: 2025-08-12 | End: 2025-08-12

## 2025-08-12 RX ORDER — SODIUM CHLORIDE 9 MG/ML
INJECTION, SOLUTION INTRAVENOUS CONTINUOUS
Status: DISCONTINUED | OUTPATIENT
Start: 2025-08-12 | End: 2025-08-12 | Stop reason: HOSPADM

## 2025-08-12 RX ORDER — GLYCOPYRROLATE 0.2 MG/ML
INJECTION INTRAMUSCULAR; INTRAVENOUS
Status: DISCONTINUED
Start: 2025-08-12 | End: 2025-08-12 | Stop reason: WASHOUT

## 2025-08-12 RX ORDER — PROPOFOL 10 MG/ML
VIAL (ML) INTRAVENOUS
Status: DISCONTINUED | OUTPATIENT
Start: 2025-08-12 | End: 2025-08-12

## 2025-08-12 RX ORDER — ALBUTEROL SULFATE 90 UG/1
INHALANT RESPIRATORY (INHALATION)
Status: DISCONTINUED
Start: 2025-08-12 | End: 2025-08-12 | Stop reason: WASHOUT

## 2025-08-12 RX ORDER — LIDOCAINE HYDROCHLORIDE 10 MG/ML
1 INJECTION, SOLUTION EPIDURAL; INFILTRATION; INTRACAUDAL; PERINEURAL ONCE
Status: DISCONTINUED | OUTPATIENT
Start: 2025-08-12 | End: 2025-08-12 | Stop reason: HOSPADM

## 2025-08-12 RX ORDER — LIDOCAINE HYDROCHLORIDE 20 MG/ML
INJECTION INTRAVENOUS
Status: DISCONTINUED | OUTPATIENT
Start: 2025-08-12 | End: 2025-08-12

## 2025-08-12 RX ADMIN — PROPOFOL 30 MG: 10 INJECTION, EMULSION INTRAVENOUS at 08:08

## 2025-08-12 RX ADMIN — PROPOFOL 80 MG: 10 INJECTION, EMULSION INTRAVENOUS at 08:08

## 2025-08-12 RX ADMIN — SODIUM CHLORIDE: 0.9 INJECTION, SOLUTION INTRAVENOUS at 07:08

## 2025-08-12 RX ADMIN — LIDOCAINE HYDROCHLORIDE 100 MG: 20 INJECTION, SOLUTION INTRAVENOUS at 08:08

## 2025-08-19 ENCOUNTER — OFFICE VISIT (OUTPATIENT)
Dept: PODIATRY | Facility: CLINIC | Age: 40
End: 2025-08-19
Payer: COMMERCIAL

## 2025-08-19 VITALS
HEART RATE: 85 BPM | BODY MASS INDEX: 33.26 KG/M2 | SYSTOLIC BLOOD PRESSURE: 127 MMHG | DIASTOLIC BLOOD PRESSURE: 82 MMHG | HEIGHT: 76 IN | WEIGHT: 273.13 LBS

## 2025-08-19 DIAGNOSIS — E11.42 TYPE 2 DIABETES MELLITUS WITH PERIPHERAL NEUROPATHY: ICD-10-CM

## 2025-08-19 DIAGNOSIS — E11.40 PAINFUL DIABETIC NEUROPATHY: Primary | ICD-10-CM

## 2025-08-19 DIAGNOSIS — M54.17 LUMBOSACRAL RADICULOPATHY: ICD-10-CM

## 2025-08-19 PROCEDURE — 99204 OFFICE O/P NEW MOD 45 MIN: CPT | Mod: S$GLB,,, | Performed by: STUDENT IN AN ORGANIZED HEALTH CARE EDUCATION/TRAINING PROGRAM

## 2025-08-19 PROCEDURE — 99999 PR PBB SHADOW E&M-EST. PATIENT-LVL IV: CPT | Mod: PBBFAC,,, | Performed by: STUDENT IN AN ORGANIZED HEALTH CARE EDUCATION/TRAINING PROGRAM

## 2025-08-19 RX ORDER — AMITRIPTYLINE HYDROCHLORIDE 25 MG/1
25 TABLET, FILM COATED ORAL NIGHTLY
Qty: 30 TABLET | Refills: 11 | Status: SHIPPED | OUTPATIENT
Start: 2025-08-19 | End: 2026-08-19

## 2025-08-20 ENCOUNTER — TELEPHONE (OUTPATIENT)
Dept: ENDOSCOPY | Facility: HOSPITAL | Age: 40
End: 2025-08-20
Payer: COMMERCIAL

## 2025-08-20 ENCOUNTER — PATIENT MESSAGE (OUTPATIENT)
Dept: ENDOSCOPY | Facility: HOSPITAL | Age: 40
End: 2025-08-20
Payer: COMMERCIAL

## 2025-08-20 DIAGNOSIS — K21.00 GASTROESOPHAGEAL REFLUX DISEASE WITH ESOPHAGITIS, UNSPECIFIED WHETHER HEMORRHAGE: Primary | ICD-10-CM

## 2025-08-25 ENCOUNTER — ANESTHESIA EVENT (OUTPATIENT)
Dept: ENDOSCOPY | Facility: HOSPITAL | Age: 40
End: 2025-08-25
Payer: COMMERCIAL

## 2025-08-26 ENCOUNTER — ANESTHESIA (OUTPATIENT)
Dept: ENDOSCOPY | Facility: HOSPITAL | Age: 40
End: 2025-08-26
Payer: COMMERCIAL

## 2025-08-26 ENCOUNTER — PATIENT MESSAGE (OUTPATIENT)
Dept: PRIMARY CARE CLINIC | Facility: CLINIC | Age: 40
End: 2025-08-26
Payer: COMMERCIAL

## 2025-08-26 ENCOUNTER — TELEPHONE (OUTPATIENT)
Dept: ENDOSCOPY | Facility: HOSPITAL | Age: 40
End: 2025-08-26
Payer: COMMERCIAL

## 2025-08-26 ENCOUNTER — HOSPITAL ENCOUNTER (OUTPATIENT)
Facility: HOSPITAL | Age: 40
Discharge: HOME | End: 2025-08-26
Attending: INTERNAL MEDICINE | Admitting: INTERNAL MEDICINE
Payer: COMMERCIAL

## 2025-08-26 VITALS — HEIGHT: 76 IN | WEIGHT: 262 LBS | BODY MASS INDEX: 31.9 KG/M2

## 2025-08-26 DIAGNOSIS — R13.10 DYSPHAGIA, UNSPECIFIED TYPE: Primary | ICD-10-CM

## 2025-08-26 PROCEDURE — 25000003 PHARM REV CODE 250: Performed by: NURSE ANESTHETIST, CERTIFIED REGISTERED

## 2025-08-26 PROCEDURE — 63600175 PHARM REV CODE 636 W HCPCS: Performed by: NURSE ANESTHETIST, CERTIFIED REGISTERED

## 2025-08-26 RX ORDER — LIDOCAINE HYDROCHLORIDE 20 MG/ML
INJECTION INTRAVENOUS
Status: DISCONTINUED | OUTPATIENT
Start: 2025-08-26 | End: 2025-08-26

## 2025-08-26 RX ORDER — PROPOFOL 10 MG/ML
VIAL (ML) INTRAVENOUS CONTINUOUS PRN
Status: DISCONTINUED | OUTPATIENT
Start: 2025-08-26 | End: 2025-08-26

## 2025-08-26 RX ADMIN — LIDOCAINE HYDROCHLORIDE 100 MG: 20 INJECTION, SOLUTION INTRAVENOUS at 01:08

## 2025-08-26 RX ADMIN — PROPOFOL 200 MCG/KG/MIN: 10 INJECTION, EMULSION INTRAVENOUS at 01:08

## 2025-08-26 RX ADMIN — SODIUM CHLORIDE: 0.9 INJECTION, SOLUTION INTRAVENOUS at 01:08

## 2025-08-27 ENCOUNTER — OFFICE VISIT (OUTPATIENT)
Dept: PRIMARY CARE CLINIC | Facility: CLINIC | Age: 40
End: 2025-08-27
Payer: COMMERCIAL

## 2025-08-27 DIAGNOSIS — I10 ESSENTIAL HYPERTENSION: Primary | ICD-10-CM

## 2025-08-27 DIAGNOSIS — I48.0 PAROXYSMAL A-FIB: ICD-10-CM

## 2025-08-27 DIAGNOSIS — Z86.79 S/P ABLATION OPERATION FOR ARRHYTHMIA: ICD-10-CM

## 2025-08-27 DIAGNOSIS — E11.40 TYPE 2 DIABETES MELLITUS WITH DIABETIC NEUROPATHY, WITHOUT LONG-TERM CURRENT USE OF INSULIN: ICD-10-CM

## 2025-08-27 DIAGNOSIS — F31.9 BIPOLAR 1 DISORDER: ICD-10-CM

## 2025-08-27 DIAGNOSIS — Z98.890 S/P ABLATION OPERATION FOR ARRHYTHMIA: ICD-10-CM

## 2025-08-27 PROCEDURE — 98006 SYNCH AUDIO-VIDEO EST MOD 30: CPT | Mod: 95,,, | Performed by: FAMILY MEDICINE

## 2025-08-27 RX ORDER — AMLODIPINE BESYLATE 2.5 MG/1
2.5 TABLET ORAL DAILY
Qty: 30 TABLET | Refills: 3 | Status: SHIPPED | OUTPATIENT
Start: 2025-08-27 | End: 2026-08-27

## 2025-09-04 ENCOUNTER — TELEPHONE (OUTPATIENT)
Dept: GASTROENTEROLOGY | Facility: CLINIC | Age: 40
End: 2025-09-04
Payer: COMMERCIAL

## 2025-09-04 DIAGNOSIS — K21.9 GASTROESOPHAGEAL REFLUX DISEASE, UNSPECIFIED WHETHER ESOPHAGITIS PRESENT: Primary | ICD-10-CM

## 2025-09-04 RX ORDER — PANTOPRAZOLE SODIUM 40 MG/1
40 TABLET, DELAYED RELEASE ORAL 2 TIMES DAILY
Qty: 180 TABLET | Refills: 3 | Status: SHIPPED | OUTPATIENT
Start: 2025-09-04 | End: 2026-09-04

## (undated) DEVICE — CATH DUO DECAPOLAR 7FRX95CM

## (undated) DEVICE — SHEATH RAMP FAST CATH 8.5F60CM

## (undated) DEVICE — CATH HIS OCTAPOLAR 7FRX115CM

## (undated) DEVICE — PACK EP DRAPE

## (undated) DEVICE — ELECTRODE POLYHESIVEPRE-ATTACH

## (undated) DEVICE — CATH CS OCTAPOLAR DX 7FRX110CM

## (undated) DEVICE — PATCH ENSITE PRECISION NAVX SE

## (undated) DEVICE — KIT PROBE COVER WITH GEL

## (undated) DEVICE — CATH TRICUSPID HALO XP 7FRX110

## (undated) DEVICE — CATH RESPONSE QPLR JSN 6F 120

## (undated) DEVICE — PAD RADIOLUCENT STAT ADULT

## (undated) DEVICE — Device

## (undated) DEVICE — INTRODUCER HEMOSTASIS 7.5F